# Patient Record
Sex: FEMALE | Race: WHITE | Employment: OTHER | URBAN - METROPOLITAN AREA
[De-identification: names, ages, dates, MRNs, and addresses within clinical notes are randomized per-mention and may not be internally consistent; named-entity substitution may affect disease eponyms.]

---

## 2017-01-03 ENCOUNTER — OFFICE VISIT (OUTPATIENT)
Dept: INTERNAL MEDICINE CLINIC | Age: 82
End: 2017-01-03

## 2017-01-03 VITALS
DIASTOLIC BLOOD PRESSURE: 70 MMHG | WEIGHT: 139 LBS | SYSTOLIC BLOOD PRESSURE: 112 MMHG | RESPIRATION RATE: 16 BRPM | BODY MASS INDEX: 22.1 KG/M2 | OXYGEN SATURATION: 95 % | HEART RATE: 55 BPM

## 2017-01-03 DIAGNOSIS — J42 CHRONIC BRONCHITIS, UNSPECIFIED CHRONIC BRONCHITIS TYPE (HCC): ICD-10-CM

## 2017-01-03 DIAGNOSIS — E78.2 MIXED HYPERLIPIDEMIA: ICD-10-CM

## 2017-01-03 DIAGNOSIS — I10 ESSENTIAL HYPERTENSION: ICD-10-CM

## 2017-01-03 DIAGNOSIS — I63.29 CEREBROVASCULAR ACCIDENT (CVA) DUE TO STENOSIS OF OTHER PRECEREBRAL ARTERY (HCC): ICD-10-CM

## 2017-01-03 DIAGNOSIS — I10 ESSENTIAL HYPERTENSION: Primary | ICD-10-CM

## 2017-01-03 DIAGNOSIS — D68.9 COAGULATION DEFECT (HCC): ICD-10-CM

## 2017-01-03 LAB
A/G RATIO: 1.6 (ref 1.1–2.2)
ALBUMIN SERPL-MCNC: 3.8 G/DL (ref 3.4–5)
ALP BLD-CCNC: 66 U/L (ref 40–129)
ALT SERPL-CCNC: 24 U/L (ref 10–40)
ANION GAP SERPL CALCULATED.3IONS-SCNC: 16 MMOL/L (ref 3–16)
AST SERPL-CCNC: 34 U/L (ref 15–37)
BASOPHILS ABSOLUTE: 0.1 K/UL (ref 0–0.2)
BASOPHILS RELATIVE PERCENT: 0.8 %
BILIRUB SERPL-MCNC: 0.5 MG/DL (ref 0–1)
BUN BLDV-MCNC: 31 MG/DL (ref 7–20)
CALCIUM SERPL-MCNC: 9.6 MG/DL (ref 8.3–10.6)
CHLORIDE BLD-SCNC: 100 MMOL/L (ref 99–110)
CHOLESTEROL, TOTAL: 182 MG/DL (ref 0–199)
CO2: 25 MMOL/L (ref 21–32)
CREAT SERPL-MCNC: 1.6 MG/DL (ref 0.6–1.2)
EOSINOPHILS ABSOLUTE: 0.4 K/UL (ref 0–0.6)
EOSINOPHILS RELATIVE PERCENT: 4.5 %
GFR AFRICAN AMERICAN: 36
GFR NON-AFRICAN AMERICAN: 30
GLOBULIN: 2.4 G/DL
GLUCOSE BLD-MCNC: 93 MG/DL (ref 70–99)
HCT VFR BLD CALC: 33.9 % (ref 36–48)
HDLC SERPL-MCNC: 76 MG/DL (ref 40–60)
HEMOGLOBIN: 11 G/DL (ref 12–16)
INTERNATIONAL NORMALIZATION RATIO, POC: 2.3
LDL CHOLESTEROL CALCULATED: 83 MG/DL
LYMPHOCYTES ABSOLUTE: 2 K/UL (ref 1–5.1)
LYMPHOCYTES RELATIVE PERCENT: 22.3 %
MCH RBC QN AUTO: 26.2 PG (ref 26–34)
MCHC RBC AUTO-ENTMCNC: 32.4 G/DL (ref 31–36)
MCV RBC AUTO: 80.7 FL (ref 80–100)
MONOCYTES ABSOLUTE: 0.7 K/UL (ref 0–1.3)
MONOCYTES RELATIVE PERCENT: 8 %
NEUTROPHILS ABSOLUTE: 5.9 K/UL (ref 1.7–7.7)
NEUTROPHILS RELATIVE PERCENT: 64.4 %
PDW BLD-RTO: 17.3 % (ref 12.4–15.4)
PLATELET # BLD: 229 K/UL (ref 135–450)
PMV BLD AUTO: 8.7 FL (ref 5–10.5)
POTASSIUM SERPL-SCNC: 3.5 MMOL/L (ref 3.5–5.1)
PROTHROMBIN TIME, POC: 27.4
RBC # BLD: 4.2 M/UL (ref 4–5.2)
SODIUM BLD-SCNC: 141 MMOL/L (ref 136–145)
TOTAL PROTEIN: 6.2 G/DL (ref 6.4–8.2)
TRIGL SERPL-MCNC: 117 MG/DL (ref 0–150)
TSH SERPL DL<=0.05 MIU/L-ACNC: 1.5 UIU/ML (ref 0.27–4.2)
VLDLC SERPL CALC-MCNC: 23 MG/DL
WBC # BLD: 9.2 K/UL (ref 4–11)

## 2017-01-03 PROCEDURE — 99214 OFFICE O/P EST MOD 30 MIN: CPT | Performed by: INTERNAL MEDICINE

## 2017-01-03 PROCEDURE — 85610 PROTHROMBIN TIME: CPT | Performed by: INTERNAL MEDICINE

## 2017-01-03 RX ORDER — MECLIZINE HYDROCHLORIDE CHEWABLE TABLETS 25 MG/1
25 TABLET, CHEWABLE ORAL EVERY 6 HOURS PRN
Qty: 60 TABLET | Refills: 1 | Status: SHIPPED | OUTPATIENT
Start: 2017-01-03 | End: 2017-05-02 | Stop reason: SDUPTHER

## 2017-01-03 RX ORDER — CYCLOBENZAPRINE HCL 5 MG
5 TABLET ORAL EVERY EVENING
Qty: 90 TABLET | Refills: 1 | Status: SHIPPED | OUTPATIENT
Start: 2017-01-03 | End: 2017-07-31 | Stop reason: SDUPTHER

## 2017-01-03 RX ORDER — HYDROCHLOROTHIAZIDE 12.5 MG/1
CAPSULE, GELATIN COATED ORAL
Qty: 90 CAPSULE | Refills: 0 | Status: SHIPPED | OUTPATIENT
Start: 2017-01-03 | End: 2017-04-13 | Stop reason: SDUPTHER

## 2017-01-03 RX ORDER — LOSARTAN POTASSIUM 25 MG/1
25 TABLET ORAL DAILY
Qty: 30 TABLET | Refills: 3 | Status: SHIPPED | OUTPATIENT
Start: 2017-01-03 | End: 2017-02-17 | Stop reason: SDUPTHER

## 2017-02-14 ENCOUNTER — OFFICE VISIT (OUTPATIENT)
Dept: INTERNAL MEDICINE CLINIC | Age: 82
End: 2017-02-14

## 2017-02-14 VITALS
SYSTOLIC BLOOD PRESSURE: 112 MMHG | HEART RATE: 70 BPM | WEIGHT: 138 LBS | RESPIRATION RATE: 14 BRPM | DIASTOLIC BLOOD PRESSURE: 70 MMHG | BODY MASS INDEX: 21.94 KG/M2 | OXYGEN SATURATION: 96 %

## 2017-02-14 DIAGNOSIS — I10 ESSENTIAL HYPERTENSION: Primary | ICD-10-CM

## 2017-02-14 DIAGNOSIS — D68.9 COAGULATION DEFECT (HCC): ICD-10-CM

## 2017-02-14 DIAGNOSIS — I63.29 CEREBROVASCULAR ACCIDENT (CVA) DUE TO STENOSIS OF OTHER PRECEREBRAL ARTERY (HCC): ICD-10-CM

## 2017-02-14 LAB
INTERNATIONAL NORMALIZATION RATIO, POC: 2.7
PROTHROMBIN TIME, POC: 32.1

## 2017-02-14 PROCEDURE — G8598 ASA/ANTIPLAT THER USED: HCPCS | Performed by: INTERNAL MEDICINE

## 2017-02-14 PROCEDURE — 4040F PNEUMOC VAC/ADMIN/RCVD: CPT | Performed by: INTERNAL MEDICINE

## 2017-02-14 PROCEDURE — 1090F PRES/ABSN URINE INCON ASSESS: CPT | Performed by: INTERNAL MEDICINE

## 2017-02-14 PROCEDURE — 99213 OFFICE O/P EST LOW 20 MIN: CPT | Performed by: INTERNAL MEDICINE

## 2017-02-14 PROCEDURE — 1123F ACP DISCUSS/DSCN MKR DOCD: CPT | Performed by: INTERNAL MEDICINE

## 2017-02-14 PROCEDURE — 85610 PROTHROMBIN TIME: CPT | Performed by: INTERNAL MEDICINE

## 2017-02-14 PROCEDURE — 1036F TOBACCO NON-USER: CPT | Performed by: INTERNAL MEDICINE

## 2017-02-14 PROCEDURE — G8427 DOCREV CUR MEDS BY ELIG CLIN: HCPCS | Performed by: INTERNAL MEDICINE

## 2017-02-14 PROCEDURE — G8484 FLU IMMUNIZE NO ADMIN: HCPCS | Performed by: INTERNAL MEDICINE

## 2017-02-14 PROCEDURE — G8419 CALC BMI OUT NRM PARAM NOF/U: HCPCS | Performed by: INTERNAL MEDICINE

## 2017-02-14 RX ORDER — WARFARIN SODIUM 2 MG/1
TABLET ORAL
Qty: 60 TABLET | Refills: 3 | Status: SHIPPED | OUTPATIENT
Start: 2017-02-14 | End: 2017-07-10 | Stop reason: SDUPTHER

## 2017-02-17 DIAGNOSIS — I10 ESSENTIAL HYPERTENSION: ICD-10-CM

## 2017-02-17 RX ORDER — LOSARTAN POTASSIUM 25 MG/1
25 TABLET ORAL DAILY
Qty: 30 TABLET | Refills: 3 | Status: SHIPPED | OUTPATIENT
Start: 2017-02-17 | End: 2017-06-27 | Stop reason: SDUPTHER

## 2017-03-27 ENCOUNTER — OFFICE VISIT (OUTPATIENT)
Dept: INTERNAL MEDICINE CLINIC | Age: 82
End: 2017-03-27

## 2017-03-27 VITALS
BODY MASS INDEX: 22.26 KG/M2 | HEART RATE: 72 BPM | RESPIRATION RATE: 16 BRPM | DIASTOLIC BLOOD PRESSURE: 70 MMHG | OXYGEN SATURATION: 97 % | SYSTOLIC BLOOD PRESSURE: 118 MMHG | WEIGHT: 140 LBS

## 2017-03-27 DIAGNOSIS — I10 ESSENTIAL HYPERTENSION: Primary | ICD-10-CM

## 2017-03-27 DIAGNOSIS — I10 ESSENTIAL HYPERTENSION: ICD-10-CM

## 2017-03-27 DIAGNOSIS — I63.29 CEREBROVASCULAR ACCIDENT (CVA) DUE TO STENOSIS OF OTHER PRECEREBRAL ARTERY (HCC): ICD-10-CM

## 2017-03-27 DIAGNOSIS — D68.9 COAGULATION DEFECT (HCC): ICD-10-CM

## 2017-03-27 LAB
ANION GAP SERPL CALCULATED.3IONS-SCNC: 16 MMOL/L (ref 3–16)
BUN BLDV-MCNC: 29 MG/DL (ref 7–20)
CALCIUM SERPL-MCNC: 9.5 MG/DL (ref 8.3–10.6)
CHLORIDE BLD-SCNC: 101 MMOL/L (ref 99–110)
CO2: 27 MMOL/L (ref 21–32)
CREAT SERPL-MCNC: 1.6 MG/DL (ref 0.6–1.2)
GFR AFRICAN AMERICAN: 36
GFR NON-AFRICAN AMERICAN: 30
GLUCOSE BLD-MCNC: 92 MG/DL (ref 70–99)
INTERNATIONAL NORMALIZATION RATIO, POC: 2.4
POTASSIUM SERPL-SCNC: 3.6 MMOL/L (ref 3.5–5.1)
PROTHROMBIN TIME, POC: 28.8
SODIUM BLD-SCNC: 144 MMOL/L (ref 136–145)

## 2017-03-27 PROCEDURE — 4040F PNEUMOC VAC/ADMIN/RCVD: CPT | Performed by: INTERNAL MEDICINE

## 2017-03-27 PROCEDURE — 99213 OFFICE O/P EST LOW 20 MIN: CPT | Performed by: INTERNAL MEDICINE

## 2017-03-27 PROCEDURE — G8598 ASA/ANTIPLAT THER USED: HCPCS | Performed by: INTERNAL MEDICINE

## 2017-03-27 PROCEDURE — G8427 DOCREV CUR MEDS BY ELIG CLIN: HCPCS | Performed by: INTERNAL MEDICINE

## 2017-03-27 PROCEDURE — 1036F TOBACCO NON-USER: CPT | Performed by: INTERNAL MEDICINE

## 2017-03-27 PROCEDURE — 85610 PROTHROMBIN TIME: CPT | Performed by: INTERNAL MEDICINE

## 2017-03-27 PROCEDURE — 1123F ACP DISCUSS/DSCN MKR DOCD: CPT | Performed by: INTERNAL MEDICINE

## 2017-03-27 PROCEDURE — G8484 FLU IMMUNIZE NO ADMIN: HCPCS | Performed by: INTERNAL MEDICINE

## 2017-03-27 PROCEDURE — 1090F PRES/ABSN URINE INCON ASSESS: CPT | Performed by: INTERNAL MEDICINE

## 2017-03-27 PROCEDURE — G8419 CALC BMI OUT NRM PARAM NOF/U: HCPCS | Performed by: INTERNAL MEDICINE

## 2017-03-28 RX ORDER — PREDNISONE 1 MG/1
TABLET ORAL
Qty: 90 TABLET | Refills: 0 | Status: SHIPPED | OUTPATIENT
Start: 2017-03-28 | End: 2017-05-30 | Stop reason: SDUPTHER

## 2017-04-13 DIAGNOSIS — I10 ESSENTIAL HYPERTENSION: ICD-10-CM

## 2017-04-13 RX ORDER — HYDROCHLOROTHIAZIDE 12.5 MG/1
CAPSULE, GELATIN COATED ORAL
Qty: 90 CAPSULE | Refills: 0 | Status: SHIPPED | OUTPATIENT
Start: 2017-04-13 | End: 2017-07-31 | Stop reason: SDUPTHER

## 2017-05-02 DIAGNOSIS — E78.2 MIXED HYPERLIPIDEMIA: ICD-10-CM

## 2017-05-02 DIAGNOSIS — I63.29 CEREBROVASCULAR ACCIDENT (CVA) DUE TO STENOSIS OF OTHER PRECEREBRAL ARTERY (HCC): ICD-10-CM

## 2017-05-02 RX ORDER — MECLIZINE HYDROCHLORIDE CHEWABLE TABLETS 25 MG/1
25 TABLET, CHEWABLE ORAL EVERY 6 HOURS PRN
Qty: 60 TABLET | Refills: 1 | Status: SHIPPED | OUTPATIENT
Start: 2017-05-02 | End: 2017-07-31 | Stop reason: SDUPTHER

## 2017-05-02 RX ORDER — LOVASTATIN 40 MG/1
TABLET ORAL
Qty: 180 TABLET | Refills: 1 | Status: SHIPPED | OUTPATIENT
Start: 2017-05-02 | End: 2017-10-02 | Stop reason: SDUPTHER

## 2017-05-30 ENCOUNTER — OFFICE VISIT (OUTPATIENT)
Dept: INTERNAL MEDICINE CLINIC | Age: 82
End: 2017-05-30

## 2017-05-30 VITALS
BODY MASS INDEX: 21.78 KG/M2 | SYSTOLIC BLOOD PRESSURE: 126 MMHG | DIASTOLIC BLOOD PRESSURE: 74 MMHG | WEIGHT: 137 LBS | RESPIRATION RATE: 16 BRPM

## 2017-05-30 DIAGNOSIS — I63.29 CEREBROVASCULAR ACCIDENT (CVA) DUE TO STENOSIS OF OTHER PRECEREBRAL ARTERY (HCC): ICD-10-CM

## 2017-05-30 DIAGNOSIS — E78.2 MIXED HYPERLIPIDEMIA: ICD-10-CM

## 2017-05-30 DIAGNOSIS — I10 ESSENTIAL HYPERTENSION: ICD-10-CM

## 2017-05-30 DIAGNOSIS — D68.9 COAGULATION DEFECT (HCC): ICD-10-CM

## 2017-05-30 DIAGNOSIS — J42 CHRONIC BRONCHITIS, UNSPECIFIED CHRONIC BRONCHITIS TYPE (HCC): ICD-10-CM

## 2017-05-30 DIAGNOSIS — I10 ESSENTIAL HYPERTENSION: Primary | ICD-10-CM

## 2017-05-30 LAB
A/G RATIO: 1.6 (ref 1.1–2.2)
ALBUMIN SERPL-MCNC: 4.2 G/DL (ref 3.4–5)
ALP BLD-CCNC: 76 U/L (ref 40–129)
ALT SERPL-CCNC: 12 U/L (ref 10–40)
ANION GAP SERPL CALCULATED.3IONS-SCNC: 13 MMOL/L (ref 3–16)
AST SERPL-CCNC: 19 U/L (ref 15–37)
BASOPHILS ABSOLUTE: 0 K/UL (ref 0–0.2)
BASOPHILS RELATIVE PERCENT: 0.5 %
BILIRUB SERPL-MCNC: 0.4 MG/DL (ref 0–1)
BUN BLDV-MCNC: 27 MG/DL (ref 7–20)
CALCIUM SERPL-MCNC: 9.8 MG/DL (ref 8.3–10.6)
CHLORIDE BLD-SCNC: 101 MMOL/L (ref 99–110)
CHOLESTEROL, TOTAL: 205 MG/DL (ref 0–199)
CO2: 28 MMOL/L (ref 21–32)
CREAT SERPL-MCNC: 1.3 MG/DL (ref 0.6–1.2)
EOSINOPHILS ABSOLUTE: 0.1 K/UL (ref 0–0.6)
EOSINOPHILS RELATIVE PERCENT: 1.3 %
GFR AFRICAN AMERICAN: 46
GFR NON-AFRICAN AMERICAN: 38
GLOBULIN: 2.7 G/DL
GLUCOSE BLD-MCNC: 99 MG/DL (ref 70–99)
HCT VFR BLD CALC: 39.3 % (ref 36–48)
HDLC SERPL-MCNC: 85 MG/DL (ref 40–60)
HEMOGLOBIN: 12.4 G/DL (ref 12–16)
INTERNATIONAL NORMALIZATION RATIO, POC: 2.5
LDL CHOLESTEROL CALCULATED: 97 MG/DL
LYMPHOCYTES ABSOLUTE: 1.7 K/UL (ref 1–5.1)
LYMPHOCYTES RELATIVE PERCENT: 22.2 %
MCH RBC QN AUTO: 26.4 PG (ref 26–34)
MCHC RBC AUTO-ENTMCNC: 31.4 G/DL (ref 31–36)
MCV RBC AUTO: 83.9 FL (ref 80–100)
MONOCYTES ABSOLUTE: 0.6 K/UL (ref 0–1.3)
MONOCYTES RELATIVE PERCENT: 7.8 %
NEUTROPHILS ABSOLUTE: 5.3 K/UL (ref 1.7–7.7)
NEUTROPHILS RELATIVE PERCENT: 68.2 %
PDW BLD-RTO: 18.2 % (ref 12.4–15.4)
PLATELET # BLD: 231 K/UL (ref 135–450)
PMV BLD AUTO: 9.2 FL (ref 5–10.5)
POTASSIUM SERPL-SCNC: 4 MMOL/L (ref 3.5–5.1)
PROTHROMBIN TIME, POC: 30.1
RBC # BLD: 4.69 M/UL (ref 4–5.2)
SODIUM BLD-SCNC: 142 MMOL/L (ref 136–145)
TOTAL PROTEIN: 6.9 G/DL (ref 6.4–8.2)
TRIGL SERPL-MCNC: 114 MG/DL (ref 0–150)
VLDLC SERPL CALC-MCNC: 23 MG/DL
WBC # BLD: 7.7 K/UL (ref 4–11)

## 2017-05-30 PROCEDURE — G8926 SPIRO NO PERF OR DOC: HCPCS | Performed by: INTERNAL MEDICINE

## 2017-05-30 PROCEDURE — G8598 ASA/ANTIPLAT THER USED: HCPCS | Performed by: INTERNAL MEDICINE

## 2017-05-30 PROCEDURE — 1036F TOBACCO NON-USER: CPT | Performed by: INTERNAL MEDICINE

## 2017-05-30 PROCEDURE — 1090F PRES/ABSN URINE INCON ASSESS: CPT | Performed by: INTERNAL MEDICINE

## 2017-05-30 PROCEDURE — 4040F PNEUMOC VAC/ADMIN/RCVD: CPT | Performed by: INTERNAL MEDICINE

## 2017-05-30 PROCEDURE — G8427 DOCREV CUR MEDS BY ELIG CLIN: HCPCS | Performed by: INTERNAL MEDICINE

## 2017-05-30 PROCEDURE — 99214 OFFICE O/P EST MOD 30 MIN: CPT | Performed by: INTERNAL MEDICINE

## 2017-05-30 PROCEDURE — 1123F ACP DISCUSS/DSCN MKR DOCD: CPT | Performed by: INTERNAL MEDICINE

## 2017-05-30 PROCEDURE — 3023F SPIROM DOC REV: CPT | Performed by: INTERNAL MEDICINE

## 2017-05-30 PROCEDURE — G8419 CALC BMI OUT NRM PARAM NOF/U: HCPCS | Performed by: INTERNAL MEDICINE

## 2017-05-30 PROCEDURE — 85610 PROTHROMBIN TIME: CPT | Performed by: INTERNAL MEDICINE

## 2017-05-30 RX ORDER — PREDNISONE 1 MG/1
TABLET ORAL
Qty: 90 TABLET | Refills: 0 | Status: SHIPPED | OUTPATIENT
Start: 2017-05-30 | End: 2017-06-27 | Stop reason: SDUPTHER

## 2017-06-27 DIAGNOSIS — K21.00 GASTRO-ESOPHAGEAL REFLUX DISEASE WITH ESOPHAGITIS: ICD-10-CM

## 2017-06-27 DIAGNOSIS — I10 ESSENTIAL HYPERTENSION: ICD-10-CM

## 2017-06-27 DIAGNOSIS — D68.9 COAGULATION DEFECT (HCC): ICD-10-CM

## 2017-06-27 DIAGNOSIS — I63.29 CEREBROVASCULAR ACCIDENT (CVA) DUE TO STENOSIS OF OTHER PRECEREBRAL ARTERY (HCC): ICD-10-CM

## 2017-06-27 RX ORDER — RANITIDINE 150 MG/1
150 CAPSULE ORAL 2 TIMES DAILY
Qty: 60 CAPSULE | Refills: 3 | Status: SHIPPED | OUTPATIENT
Start: 2017-06-27 | End: 2018-02-02 | Stop reason: SDUPTHER

## 2017-06-27 RX ORDER — LOSARTAN POTASSIUM 25 MG/1
25 TABLET ORAL DAILY
Qty: 30 TABLET | Refills: 3 | Status: SHIPPED | OUTPATIENT
Start: 2017-06-27 | End: 2017-10-02 | Stop reason: SDUPTHER

## 2017-06-27 RX ORDER — PREDNISONE 1 MG/1
TABLET ORAL
Qty: 90 TABLET | Refills: 0 | Status: SHIPPED | OUTPATIENT
Start: 2017-06-27 | End: 2017-07-31 | Stop reason: SDUPTHER

## 2017-07-10 DIAGNOSIS — I63.29 CEREBROVASCULAR ACCIDENT (CVA) DUE TO STENOSIS OF OTHER PRECEREBRAL ARTERY (HCC): ICD-10-CM

## 2017-07-10 RX ORDER — WARFARIN SODIUM 2 MG/1
TABLET ORAL
Qty: 60 TABLET | Refills: 3 | Status: SHIPPED | OUTPATIENT
Start: 2017-07-10 | End: 2017-07-31 | Stop reason: SDUPTHER

## 2017-07-31 ENCOUNTER — OFFICE VISIT (OUTPATIENT)
Dept: INTERNAL MEDICINE CLINIC | Age: 82
End: 2017-07-31

## 2017-07-31 VITALS
SYSTOLIC BLOOD PRESSURE: 128 MMHG | HEIGHT: 67 IN | DIASTOLIC BLOOD PRESSURE: 70 MMHG | HEART RATE: 69 BPM | BODY MASS INDEX: 21.97 KG/M2 | OXYGEN SATURATION: 96 % | RESPIRATION RATE: 17 BRPM | WEIGHT: 140 LBS

## 2017-07-31 DIAGNOSIS — I63.50 CEREBROVASCULAR ACCIDENT (CVA) DUE TO STENOSIS OF CEREBRAL ARTERY (HCC): ICD-10-CM

## 2017-07-31 DIAGNOSIS — D68.9 COAGULATION DEFECT (HCC): ICD-10-CM

## 2017-07-31 DIAGNOSIS — I10 ESSENTIAL HYPERTENSION: Primary | ICD-10-CM

## 2017-07-31 DIAGNOSIS — R42 VERTIGO: ICD-10-CM

## 2017-07-31 DIAGNOSIS — E78.2 MIXED HYPERLIPIDEMIA: ICD-10-CM

## 2017-07-31 LAB
INTERNATIONAL NORMALIZATION RATIO, POC: 2.6
PROTHROMBIN TIME, POC: 31.4

## 2017-07-31 PROCEDURE — G8420 CALC BMI NORM PARAMETERS: HCPCS | Performed by: INTERNAL MEDICINE

## 2017-07-31 PROCEDURE — 1036F TOBACCO NON-USER: CPT | Performed by: INTERNAL MEDICINE

## 2017-07-31 PROCEDURE — G8427 DOCREV CUR MEDS BY ELIG CLIN: HCPCS | Performed by: INTERNAL MEDICINE

## 2017-07-31 PROCEDURE — 4040F PNEUMOC VAC/ADMIN/RCVD: CPT | Performed by: INTERNAL MEDICINE

## 2017-07-31 PROCEDURE — 1090F PRES/ABSN URINE INCON ASSESS: CPT | Performed by: INTERNAL MEDICINE

## 2017-07-31 PROCEDURE — 1123F ACP DISCUSS/DSCN MKR DOCD: CPT | Performed by: INTERNAL MEDICINE

## 2017-07-31 PROCEDURE — 99213 OFFICE O/P EST LOW 20 MIN: CPT | Performed by: INTERNAL MEDICINE

## 2017-07-31 PROCEDURE — 85610 PROTHROMBIN TIME: CPT | Performed by: INTERNAL MEDICINE

## 2017-07-31 PROCEDURE — G8598 ASA/ANTIPLAT THER USED: HCPCS | Performed by: INTERNAL MEDICINE

## 2017-07-31 RX ORDER — WARFARIN SODIUM 2 MG/1
TABLET ORAL
Qty: 60 TABLET | Refills: 3 | Status: SHIPPED | OUTPATIENT
Start: 2017-07-31 | End: 2018-02-02 | Stop reason: SDUPTHER

## 2017-07-31 RX ORDER — PREDNISONE 1 MG/1
TABLET ORAL
Qty: 90 TABLET | Refills: 0 | Status: SHIPPED | OUTPATIENT
Start: 2017-07-31 | End: 2017-10-02 | Stop reason: SDUPTHER

## 2017-07-31 RX ORDER — HYDROCHLOROTHIAZIDE 12.5 MG/1
CAPSULE, GELATIN COATED ORAL
Qty: 90 CAPSULE | Refills: 0 | Status: SHIPPED | OUTPATIENT
Start: 2017-07-31 | End: 2017-10-02 | Stop reason: SDUPTHER

## 2017-07-31 RX ORDER — MECLIZINE HYDROCHLORIDE CHEWABLE TABLETS 25 MG/1
25 TABLET, CHEWABLE ORAL EVERY 6 HOURS PRN
Qty: 60 TABLET | Refills: 1 | Status: SHIPPED | OUTPATIENT
Start: 2017-07-31 | End: 2017-10-02 | Stop reason: SDUPTHER

## 2017-08-01 RX ORDER — CYCLOBENZAPRINE HCL 5 MG
5 TABLET ORAL EVERY EVENING
Qty: 90 TABLET | Refills: 1 | Status: SHIPPED | OUTPATIENT
Start: 2017-08-01 | End: 2018-02-02 | Stop reason: SDUPTHER

## 2017-10-02 ENCOUNTER — OFFICE VISIT (OUTPATIENT)
Dept: INTERNAL MEDICINE CLINIC | Age: 82
End: 2017-10-02

## 2017-10-02 VITALS
BODY MASS INDEX: 21.94 KG/M2 | OXYGEN SATURATION: 98 % | SYSTOLIC BLOOD PRESSURE: 136 MMHG | HEART RATE: 76 BPM | DIASTOLIC BLOOD PRESSURE: 80 MMHG | WEIGHT: 138 LBS

## 2017-10-02 DIAGNOSIS — J44.1 COPD EXACERBATION (HCC): Primary | ICD-10-CM

## 2017-10-02 DIAGNOSIS — R42 VERTIGO: ICD-10-CM

## 2017-10-02 DIAGNOSIS — E78.2 MIXED HYPERLIPIDEMIA: ICD-10-CM

## 2017-10-02 DIAGNOSIS — Z23 NEED FOR INFLUENZA VACCINATION: ICD-10-CM

## 2017-10-02 DIAGNOSIS — D68.9 COAGULATION DEFECT (HCC): ICD-10-CM

## 2017-10-02 DIAGNOSIS — I10 ESSENTIAL HYPERTENSION: ICD-10-CM

## 2017-10-02 DIAGNOSIS — J01.00 ACUTE NON-RECURRENT MAXILLARY SINUSITIS: ICD-10-CM

## 2017-10-02 LAB
INTERNATIONAL NORMALIZATION RATIO, POC: 3
PROTHROMBIN TIME, POC: 36

## 2017-10-02 PROCEDURE — 4040F PNEUMOC VAC/ADMIN/RCVD: CPT | Performed by: INTERNAL MEDICINE

## 2017-10-02 PROCEDURE — 3023F SPIROM DOC REV: CPT | Performed by: INTERNAL MEDICINE

## 2017-10-02 PROCEDURE — G8598 ASA/ANTIPLAT THER USED: HCPCS | Performed by: INTERNAL MEDICINE

## 2017-10-02 PROCEDURE — 1123F ACP DISCUSS/DSCN MKR DOCD: CPT | Performed by: INTERNAL MEDICINE

## 2017-10-02 PROCEDURE — 1090F PRES/ABSN URINE INCON ASSESS: CPT | Performed by: INTERNAL MEDICINE

## 2017-10-02 PROCEDURE — G8484 FLU IMMUNIZE NO ADMIN: HCPCS | Performed by: INTERNAL MEDICINE

## 2017-10-02 PROCEDURE — 96372 THER/PROPH/DIAG INJ SC/IM: CPT | Performed by: INTERNAL MEDICINE

## 2017-10-02 PROCEDURE — G8427 DOCREV CUR MEDS BY ELIG CLIN: HCPCS | Performed by: INTERNAL MEDICINE

## 2017-10-02 PROCEDURE — G8926 SPIRO NO PERF OR DOC: HCPCS | Performed by: INTERNAL MEDICINE

## 2017-10-02 PROCEDURE — 1036F TOBACCO NON-USER: CPT | Performed by: INTERNAL MEDICINE

## 2017-10-02 PROCEDURE — G8420 CALC BMI NORM PARAMETERS: HCPCS | Performed by: INTERNAL MEDICINE

## 2017-10-02 PROCEDURE — G0008 ADMIN INFLUENZA VIRUS VAC: HCPCS | Performed by: INTERNAL MEDICINE

## 2017-10-02 PROCEDURE — 90662 IIV NO PRSV INCREASED AG IM: CPT | Performed by: INTERNAL MEDICINE

## 2017-10-02 PROCEDURE — 85610 PROTHROMBIN TIME: CPT | Performed by: INTERNAL MEDICINE

## 2017-10-02 PROCEDURE — 99214 OFFICE O/P EST MOD 30 MIN: CPT | Performed by: INTERNAL MEDICINE

## 2017-10-02 RX ORDER — MECLIZINE HYDROCHLORIDE CHEWABLE TABLETS 25 MG/1
25 TABLET, CHEWABLE ORAL EVERY 6 HOURS PRN
Qty: 60 TABLET | Refills: 1 | Status: SHIPPED | OUTPATIENT
Start: 2017-10-02 | End: 2018-02-02 | Stop reason: SDUPTHER

## 2017-10-02 RX ORDER — AZITHROMYCIN 250 MG/1
TABLET, FILM COATED ORAL
Qty: 6 TABLET | Refills: 0 | Status: SHIPPED | OUTPATIENT
Start: 2017-10-02 | End: 2018-02-02 | Stop reason: ALTCHOICE

## 2017-10-02 RX ORDER — LOSARTAN POTASSIUM 25 MG/1
25 TABLET ORAL DAILY
Qty: 30 TABLET | Refills: 3 | Status: SHIPPED | OUTPATIENT
Start: 2017-10-02 | End: 2018-02-02 | Stop reason: SDUPTHER

## 2017-10-02 RX ORDER — HYDROCHLOROTHIAZIDE 12.5 MG/1
CAPSULE, GELATIN COATED ORAL
Qty: 90 CAPSULE | Refills: 1 | Status: SHIPPED | OUTPATIENT
Start: 2017-10-02 | End: 2018-02-02 | Stop reason: SDUPTHER

## 2017-10-02 RX ORDER — LOVASTATIN 40 MG/1
TABLET ORAL
Qty: 180 TABLET | Refills: 1 | Status: SHIPPED | OUTPATIENT
Start: 2017-10-02 | End: 2018-02-02 | Stop reason: SDUPTHER

## 2017-10-02 RX ORDER — PREDNISONE 1 MG/1
TABLET ORAL
Qty: 90 TABLET | Refills: 0 | Status: SHIPPED | OUTPATIENT
Start: 2017-10-02 | End: 2017-12-01 | Stop reason: SDUPTHER

## 2017-10-02 RX ORDER — METHYLPREDNISOLONE ACETATE 80 MG/ML
80 INJECTION, SUSPENSION INTRA-ARTICULAR; INTRALESIONAL; INTRAMUSCULAR; SOFT TISSUE ONCE
Status: COMPLETED | OUTPATIENT
Start: 2017-10-02 | End: 2017-10-02

## 2017-10-02 RX ADMIN — METHYLPREDNISOLONE ACETATE 80 MG: 80 INJECTION, SUSPENSION INTRA-ARTICULAR; INTRALESIONAL; INTRAMUSCULAR; SOFT TISSUE at 09:16

## 2017-10-02 ASSESSMENT — PATIENT HEALTH QUESTIONNAIRE - PHQ9
2. FEELING DOWN, DEPRESSED OR HOPELESS: 1
1. LITTLE INTEREST OR PLEASURE IN DOING THINGS: 0
SUM OF ALL RESPONSES TO PHQ QUESTIONS 1-9: 1
SUM OF ALL RESPONSES TO PHQ9 QUESTIONS 1 & 2: 1

## 2017-10-02 NOTE — MR AVS SNAPSHOT
Final result (Collected: 10/2/2017  8:51 AM)           10/2/2017  8:51 AM      Component Results     Component Value    INR 3.0    Protime 36.0               Additional Information        Basic Information     Date Of Birth Sex Race Ethnicity Preferred Language    5/30/1922 Female White Non-/Non  English      Problem List as of 10/2/2017  Date Reviewed: 10/2/2017                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 ([facility] [address]    Traumatic open wound of left lower leg    Peripheral vascular disease ([facility] [address]    Gout    COPD (chronic obstructive pulmonary disease) ([facility] [address]    Osteoarthritis of hand, left    Osteoarthritis, generalized    Basosquamous carcinoma    Hypertension    Hyperlipidemia    CVA (cerebral vascular accident) ([facility] [address]    Gastro-esophageal reflux disease with esophagitis      Immunizations as of 10/2/2017     Name Date    Influenza Virus Vaccine 10/1/2013, 9/14/2012    Influenza, High Dose 10/14/2016, 10/1/2015, 9/16/2014    Pneumococcal 13-valent Conjugate ([id number]) 11/21/2016    Pneumococcal Polysaccharide ([id number]) 10/1/2015      Preventive Care        Date Due    Tetanus Combination Vaccine (1 - Tdap) 5/30/1941    Zoster Vaccine 5/30/1982    Yearly Flu Vaccine (1) 9/1/2017            [employer] Signup           [employer] allows you to send messages to your doctor, view your test results, renew your prescriptions, schedule appointments, view visit notes, and more. How Do I Sign Up? 1. In your Internet browser, go to [email or web address]  2. Click on the Sign Up Now link in the Sign In box. You will see the New Member Sign Up page. 3. Enter your [employer] Access Code exactly as it appears below. You will not need to use this code after youve completed the sign-up process. If you do not sign up before the expiration date, you must request a new code.   [employer] Access Code: [email or web address]  Expires: 12/1/2017  9:07 AM

## 2017-10-02 NOTE — PROGRESS NOTES
Valerie Yarbrough  5/30/1922  10/02/17    SUBJECTIVE:    CVA and coagulopathy- inr today is 3.0.  rec hold coumadin one day since will need atbs. COPD and ac sinusitis, past couple of weeks w sinus congestion and throat congestion, tr cough and wheezing but no fever. Denies n/v/diarrhea. HTN- bp stable and w/o sx cp. Vertigo chr after cva and stable w prn antivert. Due for flu shot. Lipids:  Is continuing statin therapy and low fat diet. Tolerating medications w/o myalgias or GI upset. Pending f/u lab to be done in ~2 weeks after completion of therapy. OBJECTIVE:    /80 (Site: Left Arm, Position: Sitting, Cuff Size: Small Adult)  Pulse 76  Wt 138 lb (62.6 kg)  SpO2 98%  Breastfeeding? No  BMI 21.94 kg/m2    Physical Exam   Constitutional: She appears well-developed and well-nourished. No distress. HENT:   Head: Normocephalic and atraumatic. Nose: Nose normal. No nasal deformity. No epistaxis. Mouth/Throat: Oropharynx is clear and moist. No oropharyngeal exudate. Bilateral nasal congestion with clear discharge noted, no bilateral maxillary sinus tenderness   Eyes: Conjunctivae and EOM are normal. Pupils are equal, round, and reactive to light. Right eye exhibits no discharge. Left eye exhibits no discharge. No scleral icterus. Neck: Neck supple. No tracheal deviation present. Cardiovascular: Normal rate, regular rhythm, normal heart sounds and intact distal pulses. Exam reveals no gallop and no friction rub. No murmur heard. Pulmonary/Chest: Effort normal. No respiratory distress. She has wheezes (TR EXP BILAT). She has no rales. Abdominal: Soft. Bowel sounds are normal. She exhibits no distension and no mass. There is no tenderness. There is no rebound and no guarding. Musculoskeletal: She exhibits no edema. Lymphadenopathy:     She has no cervical adenopathy. Neurological: She is alert. Skin: Skin is warm and dry.    Psychiatric: She has a normal mood and

## 2017-12-01 ENCOUNTER — OFFICE VISIT (OUTPATIENT)
Dept: INTERNAL MEDICINE CLINIC | Age: 82
End: 2017-12-01

## 2017-12-01 VITALS
DIASTOLIC BLOOD PRESSURE: 82 MMHG | OXYGEN SATURATION: 95 % | HEART RATE: 70 BPM | SYSTOLIC BLOOD PRESSURE: 142 MMHG | WEIGHT: 130 LBS | BODY MASS INDEX: 20.67 KG/M2 | RESPIRATION RATE: 15 BRPM

## 2017-12-01 DIAGNOSIS — E78.2 MIXED HYPERLIPIDEMIA: ICD-10-CM

## 2017-12-01 DIAGNOSIS — M15.9 OSTEOARTHRITIS, GENERALIZED: ICD-10-CM

## 2017-12-01 DIAGNOSIS — I63.50 CEREBROVASCULAR ACCIDENT (CVA) DUE TO STENOSIS OF CEREBRAL ARTERY (HCC): ICD-10-CM

## 2017-12-01 DIAGNOSIS — I10 ESSENTIAL HYPERTENSION: Primary | ICD-10-CM

## 2017-12-01 DIAGNOSIS — D68.9 COAGULATION DEFECT (HCC): ICD-10-CM

## 2017-12-01 PROCEDURE — 1036F TOBACCO NON-USER: CPT | Performed by: INTERNAL MEDICINE

## 2017-12-01 PROCEDURE — 4040F PNEUMOC VAC/ADMIN/RCVD: CPT | Performed by: INTERNAL MEDICINE

## 2017-12-01 PROCEDURE — 1090F PRES/ABSN URINE INCON ASSESS: CPT | Performed by: INTERNAL MEDICINE

## 2017-12-01 PROCEDURE — 99214 OFFICE O/P EST MOD 30 MIN: CPT | Performed by: INTERNAL MEDICINE

## 2017-12-01 PROCEDURE — G8598 ASA/ANTIPLAT THER USED: HCPCS | Performed by: INTERNAL MEDICINE

## 2017-12-01 PROCEDURE — G8420 CALC BMI NORM PARAMETERS: HCPCS | Performed by: INTERNAL MEDICINE

## 2017-12-01 PROCEDURE — 1123F ACP DISCUSS/DSCN MKR DOCD: CPT | Performed by: INTERNAL MEDICINE

## 2017-12-01 PROCEDURE — G8484 FLU IMMUNIZE NO ADMIN: HCPCS | Performed by: INTERNAL MEDICINE

## 2017-12-01 PROCEDURE — G8427 DOCREV CUR MEDS BY ELIG CLIN: HCPCS | Performed by: INTERNAL MEDICINE

## 2017-12-01 RX ORDER — PREDNISONE 1 MG/1
TABLET ORAL
Qty: 90 TABLET | Refills: 0 | Status: SHIPPED | OUTPATIENT
Start: 2017-12-01 | End: 2018-02-02 | Stop reason: SDUPTHER

## 2017-12-02 LAB
A/G RATIO: 2.2 (CALC) (ref 0.8–2.6)
ALBUMIN SERPL-MCNC: 4.2 GM/DL (ref 3.5–5.2)
ALP BLD-CCNC: 63 U/L (ref 23–144)
ALT SERPL-CCNC: 21 U/L (ref 0–60)
AST SERPL-CCNC: 26 U/L (ref 0–55)
BASOPHILS ABSOLUTE: 0 K/MM3 (ref 0–0.3)
BASOPHILS RELATIVE PERCENT: 0.2 % (ref 0–2)
BILIRUB SERPL-MCNC: 0.7 MG/DL (ref 0–1.2)
BUN / CREAT RATIO: 16 (CALC) (ref 7–25)
BUN BLDV-MCNC: 22 MG/DL (ref 3–29)
CALCIUM SERPL-MCNC: 9.9 MG/DL (ref 8.5–10.5)
CHLORIDE BLD-SCNC: 102 MEQ/L (ref 96–110)
CHOLESTEROL, TOTAL: 225 MG/DL
CO2: 32 MEQ/L (ref 19–32)
COPY(IES) SENT TO:: NORMAL
CREAT SERPL-MCNC: 1.4 MG/DL
EOSINOPHILS ABSOLUTE: 0.1 K/MM3 (ref 0–0.6)
EOSINOPHILS RELATIVE PERCENT: 1.4 % (ref 0–7)
GFR SERPL CREATININE-BSD FRML MDRD: 32 ML/MIN/1.73M2
GLOBULIN: 1.9 GM/DL (CALC) (ref 1.9–3.6)
GLUCOSE BLD-MCNC: 93 MG/DL
HCT VFR BLD CALC: 42.8 % (ref 35–46)
HDLC SERPL-MCNC: 97 MG/DL
HEMOGLOBIN: 13.9 G/DL (ref 12–15.6)
LDL CHOLESTEROL: 99 MG/DL (CALC)
LEUKOCYTES, UA: 9.3 K/MM3 (ref 3.8–10.8)
LYMPHOCYTES ABSOLUTE: 1.7 K/MM3 (ref 0.9–4.1)
LYMPHOCYTES RELATIVE PERCENT: 18 % (ref 14–51)
MCH RBC QN AUTO: 28.9 PG (ref 27–33)
MCHC RBC AUTO-ENTMCNC: 32.6 G/DL (ref 32–36)
MCV RBC AUTO: 88.6 FL (ref 80–100)
MONOCYTES ABSOLUTE: 0.7 K/MM3 (ref 0.2–1.1)
MONOCYTES RELATIVE PERCENT: 7.2 % (ref 0–14)
NEUTROPHILS ABSOLUTE: 6.8 K/MM3 (ref 1.5–7.8)
PDW BLD-RTO: 16.7 % (ref 9–15)
PLATELET # BLD: 209 K/MM3 (ref 130–400)
POTASSIUM SERPL-SCNC: 3.6 MEQ/L (ref 3.4–5.3)
RBC # BLD: 4.82 M/MM3 (ref 3.9–5.2)
SEGMENTED NEUTROPHILS RELATIVE PERCENT: 73.2 % (ref 40–76)
SODIUM BLD-SCNC: 145 MEQ/L (ref 135–148)
TOTAL PROTEIN: 6.1 GM/DL (ref 6–8.3)
TRIGL SERPL-MCNC: 143 MG/DL
TSH SERPL DL<=0.05 MIU/L-ACNC: 2.14 MICRO IU/ML (ref 0.4–4)
VLDLC SERPL CALC-MCNC: 29 MG/DL (CALC) (ref 4–38)

## 2017-12-04 NOTE — PROGRESS NOTES
Call pt, labs ok/chol ok AND THYROID FXN IS NL.  TOTAL CHOL SL HIGHER --225 BUT ALSO BECAUSE HER GOOD CHOL IS IMPROVED.

## 2018-02-02 ENCOUNTER — TELEPHONE (OUTPATIENT)
Dept: INTERNAL MEDICINE CLINIC | Age: 83
End: 2018-02-02

## 2018-02-02 ENCOUNTER — OFFICE VISIT (OUTPATIENT)
Dept: INTERNAL MEDICINE CLINIC | Age: 83
End: 2018-02-02

## 2018-02-02 VITALS
BODY MASS INDEX: 21.62 KG/M2 | DIASTOLIC BLOOD PRESSURE: 60 MMHG | WEIGHT: 136 LBS | RESPIRATION RATE: 16 BRPM | SYSTOLIC BLOOD PRESSURE: 116 MMHG

## 2018-02-02 DIAGNOSIS — D68.9 COAGULATION DEFECT (HCC): ICD-10-CM

## 2018-02-02 DIAGNOSIS — M15.9 OSTEOARTHRITIS, GENERALIZED: ICD-10-CM

## 2018-02-02 DIAGNOSIS — I10 ESSENTIAL HYPERTENSION: ICD-10-CM

## 2018-02-02 DIAGNOSIS — E78.2 MIXED HYPERLIPIDEMIA: ICD-10-CM

## 2018-02-02 DIAGNOSIS — I63.50 CEREBROVASCULAR ACCIDENT (CVA) DUE TO STENOSIS OF CEREBRAL ARTERY (HCC): ICD-10-CM

## 2018-02-02 DIAGNOSIS — R42 VERTIGO: ICD-10-CM

## 2018-02-02 DIAGNOSIS — K21.00 GASTRO-ESOPHAGEAL REFLUX DISEASE WITH ESOPHAGITIS: ICD-10-CM

## 2018-02-02 DIAGNOSIS — L03.116 CELLULITIS OF LEG WITHOUT FOOT, LEFT: Primary | ICD-10-CM

## 2018-02-02 DIAGNOSIS — I73.9 PERIPHERAL VASCULAR DISEASE (HCC): ICD-10-CM

## 2018-02-02 LAB
INTERNATIONAL NORMALIZATION RATIO, POC: 1.8
PROTHROMBIN TIME, POC: 21.4

## 2018-02-02 PROCEDURE — 1090F PRES/ABSN URINE INCON ASSESS: CPT | Performed by: INTERNAL MEDICINE

## 2018-02-02 PROCEDURE — 4040F PNEUMOC VAC/ADMIN/RCVD: CPT | Performed by: INTERNAL MEDICINE

## 2018-02-02 PROCEDURE — G8598 ASA/ANTIPLAT THER USED: HCPCS | Performed by: INTERNAL MEDICINE

## 2018-02-02 PROCEDURE — G8427 DOCREV CUR MEDS BY ELIG CLIN: HCPCS | Performed by: INTERNAL MEDICINE

## 2018-02-02 PROCEDURE — 85610 PROTHROMBIN TIME: CPT | Performed by: INTERNAL MEDICINE

## 2018-02-02 PROCEDURE — 99214 OFFICE O/P EST MOD 30 MIN: CPT | Performed by: INTERNAL MEDICINE

## 2018-02-02 PROCEDURE — G8484 FLU IMMUNIZE NO ADMIN: HCPCS | Performed by: INTERNAL MEDICINE

## 2018-02-02 PROCEDURE — 1123F ACP DISCUSS/DSCN MKR DOCD: CPT | Performed by: INTERNAL MEDICINE

## 2018-02-02 PROCEDURE — G8420 CALC BMI NORM PARAMETERS: HCPCS | Performed by: INTERNAL MEDICINE

## 2018-02-02 PROCEDURE — 1036F TOBACCO NON-USER: CPT | Performed by: INTERNAL MEDICINE

## 2018-02-02 RX ORDER — WARFARIN SODIUM 2 MG/1
TABLET ORAL
Qty: 60 TABLET | Refills: 3 | Status: SHIPPED | OUTPATIENT
Start: 2018-02-02 | End: 2018-04-16

## 2018-02-02 RX ORDER — CYCLOBENZAPRINE HCL 5 MG
5 TABLET ORAL EVERY EVENING
Qty: 90 TABLET | Refills: 1 | Status: SHIPPED | OUTPATIENT
Start: 2018-02-02 | End: 2018-08-17 | Stop reason: SDUPTHER

## 2018-02-02 RX ORDER — LOSARTAN POTASSIUM 25 MG/1
25 TABLET ORAL DAILY
Qty: 90 TABLET | Refills: 1 | Status: SHIPPED | OUTPATIENT
Start: 2018-02-02 | End: 2018-03-08 | Stop reason: SDUPTHER

## 2018-02-02 RX ORDER — PREDNISONE 1 MG/1
TABLET ORAL
Qty: 90 TABLET | Refills: 1 | Status: SHIPPED | OUTPATIENT
Start: 2018-02-02 | End: 2018-04-04 | Stop reason: SDUPTHER

## 2018-02-02 RX ORDER — HYDROCHLOROTHIAZIDE 12.5 MG/1
CAPSULE, GELATIN COATED ORAL
Qty: 90 CAPSULE | Refills: 1 | Status: ON HOLD | OUTPATIENT
Start: 2018-02-02 | End: 2018-04-27 | Stop reason: HOSPADM

## 2018-02-02 RX ORDER — RANITIDINE 150 MG/1
150 CAPSULE ORAL 2 TIMES DAILY
Qty: 180 CAPSULE | Refills: 1 | Status: SHIPPED | OUTPATIENT
Start: 2018-02-02 | End: 2018-08-17 | Stop reason: SDUPTHER

## 2018-02-02 RX ORDER — LOVASTATIN 40 MG/1
TABLET ORAL
Qty: 180 TABLET | Refills: 1 | Status: SHIPPED | OUTPATIENT
Start: 2018-02-02 | End: 2018-05-23 | Stop reason: SDUPTHER

## 2018-02-02 RX ORDER — CEPHALEXIN 500 MG/1
500 CAPSULE ORAL 3 TIMES DAILY
Qty: 21 CAPSULE | Refills: 0 | Status: SHIPPED | OUTPATIENT
Start: 2018-02-02 | End: 2018-02-09

## 2018-02-02 RX ORDER — MECLIZINE HYDROCHLORIDE CHEWABLE TABLETS 25 MG/1
25 TABLET, CHEWABLE ORAL EVERY 6 HOURS PRN
Qty: 60 TABLET | Refills: 1 | Status: SHIPPED | OUTPATIENT
Start: 2018-02-02 | End: 2018-05-23 | Stop reason: SDUPTHER

## 2018-02-16 ENCOUNTER — OFFICE VISIT (OUTPATIENT)
Dept: INTERNAL MEDICINE CLINIC | Age: 83
End: 2018-02-16

## 2018-02-16 VITALS
DIASTOLIC BLOOD PRESSURE: 70 MMHG | OXYGEN SATURATION: 97 % | HEIGHT: 66 IN | RESPIRATION RATE: 16 BRPM | HEART RATE: 68 BPM | SYSTOLIC BLOOD PRESSURE: 112 MMHG

## 2018-02-16 DIAGNOSIS — I10 ESSENTIAL HYPERTENSION: ICD-10-CM

## 2018-02-16 DIAGNOSIS — J42 CHRONIC BRONCHITIS, UNSPECIFIED CHRONIC BRONCHITIS TYPE (HCC): ICD-10-CM

## 2018-02-16 DIAGNOSIS — L03.119 CELLULITIS OF LOWER EXTREMITY, UNSPECIFIED LATERALITY: Primary | ICD-10-CM

## 2018-02-16 DIAGNOSIS — I63.50 CEREBROVASCULAR ACCIDENT (CVA) DUE TO STENOSIS OF CEREBRAL ARTERY (HCC): ICD-10-CM

## 2018-02-16 LAB
INTERNATIONAL NORMALIZATION RATIO, POC: 2.3
PROTHROMBIN TIME, POC: 28.2

## 2018-02-16 PROCEDURE — G8926 SPIRO NO PERF OR DOC: HCPCS | Performed by: INTERNAL MEDICINE

## 2018-02-16 PROCEDURE — 85610 PROTHROMBIN TIME: CPT | Performed by: INTERNAL MEDICINE

## 2018-02-16 PROCEDURE — 1123F ACP DISCUSS/DSCN MKR DOCD: CPT | Performed by: INTERNAL MEDICINE

## 2018-02-16 PROCEDURE — 4040F PNEUMOC VAC/ADMIN/RCVD: CPT | Performed by: INTERNAL MEDICINE

## 2018-02-16 PROCEDURE — G8427 DOCREV CUR MEDS BY ELIG CLIN: HCPCS | Performed by: INTERNAL MEDICINE

## 2018-02-16 PROCEDURE — G8598 ASA/ANTIPLAT THER USED: HCPCS | Performed by: INTERNAL MEDICINE

## 2018-02-16 PROCEDURE — G8484 FLU IMMUNIZE NO ADMIN: HCPCS | Performed by: INTERNAL MEDICINE

## 2018-02-16 PROCEDURE — 99213 OFFICE O/P EST LOW 20 MIN: CPT | Performed by: INTERNAL MEDICINE

## 2018-02-16 PROCEDURE — G8420 CALC BMI NORM PARAMETERS: HCPCS | Performed by: INTERNAL MEDICINE

## 2018-02-16 PROCEDURE — 3023F SPIROM DOC REV: CPT | Performed by: INTERNAL MEDICINE

## 2018-02-16 PROCEDURE — 1036F TOBACCO NON-USER: CPT | Performed by: INTERNAL MEDICINE

## 2018-02-16 PROCEDURE — 1090F PRES/ABSN URINE INCON ASSESS: CPT | Performed by: INTERNAL MEDICINE

## 2018-02-16 RX ORDER — POTASSIUM CHLORIDE 750 MG/1
10 TABLET, EXTENDED RELEASE ORAL DAILY
Qty: 7 TABLET | Refills: 0 | Status: SHIPPED | OUTPATIENT
Start: 2018-02-16 | End: 2018-03-02 | Stop reason: ALTCHOICE

## 2018-02-16 RX ORDER — FUROSEMIDE 20 MG/1
20 TABLET ORAL DAILY
Qty: 7 TABLET | Refills: 0 | Status: SHIPPED | OUTPATIENT
Start: 2018-02-16 | End: 2018-03-02 | Stop reason: ALTCHOICE

## 2018-02-16 RX ORDER — CLINDAMYCIN HYDROCHLORIDE 300 MG/1
300 CAPSULE ORAL 2 TIMES DAILY
Qty: 14 CAPSULE | Refills: 0 | Status: SHIPPED | OUTPATIENT
Start: 2018-02-16 | End: 2018-02-22 | Stop reason: SDUPTHER

## 2018-02-16 NOTE — PATIENT INSTRUCTIONS
Continue to elevate legs. Please use the compression stockings daily    Hold hydrochlorothiazide while on lasix and potassium the following week. Clindamycin antibiotic will be twice/day for one week also.

## 2018-02-22 ENCOUNTER — OFFICE VISIT (OUTPATIENT)
Dept: INTERNAL MEDICINE CLINIC | Age: 83
End: 2018-02-22

## 2018-02-22 ENCOUNTER — HOSPITAL ENCOUNTER (OUTPATIENT)
Dept: ULTRASOUND IMAGING | Age: 83
Discharge: HOME | End: 2018-02-22
Attending: INTERNAL MEDICINE | Admitting: INTERNAL MEDICINE

## 2018-02-22 VITALS
RESPIRATION RATE: 16 BRPM | OXYGEN SATURATION: 92 % | DIASTOLIC BLOOD PRESSURE: 84 MMHG | BODY MASS INDEX: 21.89 KG/M2 | SYSTOLIC BLOOD PRESSURE: 132 MMHG | WEIGHT: 136.2 LBS | HEART RATE: 63 BPM | HEIGHT: 66 IN

## 2018-02-22 DIAGNOSIS — I70.232 ATHEROSCLEROSIS OF NATIVE ARTERY OF RIGHT LOWER EXTREMITY WITH ULCERATION OF CALF (HCC): ICD-10-CM

## 2018-02-22 DIAGNOSIS — I70.242 ATHEROSCLEROSIS OF NATIVE ARTERY OF BOTH LOWER EXTREMITIES WITH BILATERAL ULCERATION OF CALVES (HCC): Primary | ICD-10-CM

## 2018-02-22 DIAGNOSIS — I70.242 ATHEROSCLEROSIS OF NATIVE ARTERY OF BOTH LOWER EXTREMITIES WITH BILATERAL ULCERATION OF CALVES (HCC): ICD-10-CM

## 2018-02-22 DIAGNOSIS — L03.119 CELLULITIS OF LOWER EXTREMITY, UNSPECIFIED LATERALITY: ICD-10-CM

## 2018-02-22 DIAGNOSIS — I70.232 ATHEROSCLEROSIS OF NATIVE ARTERY OF BOTH LOWER EXTREMITIES WITH BILATERAL ULCERATION OF CALVES (HCC): Primary | ICD-10-CM

## 2018-02-22 DIAGNOSIS — I63.50 CEREBROVASCULAR ACCIDENT (CVA) DUE TO STENOSIS OF CEREBRAL ARTERY (HCC): ICD-10-CM

## 2018-02-22 DIAGNOSIS — I70.232 ATHEROSCLEROSIS OF NATIVE ARTERY OF BOTH LOWER EXTREMITIES WITH BILATERAL ULCERATION OF CALVES (HCC): ICD-10-CM

## 2018-02-22 LAB
INTERNATIONAL NORMALIZATION RATIO, POC: 2.7
PROTHROMBIN TIME, POC: NORMAL

## 2018-02-22 PROCEDURE — 1123F ACP DISCUSS/DSCN MKR DOCD: CPT | Performed by: INTERNAL MEDICINE

## 2018-02-22 PROCEDURE — G8484 FLU IMMUNIZE NO ADMIN: HCPCS | Performed by: INTERNAL MEDICINE

## 2018-02-22 PROCEDURE — 1090F PRES/ABSN URINE INCON ASSESS: CPT | Performed by: INTERNAL MEDICINE

## 2018-02-22 PROCEDURE — 4040F PNEUMOC VAC/ADMIN/RCVD: CPT | Performed by: INTERNAL MEDICINE

## 2018-02-22 PROCEDURE — 1036F TOBACCO NON-USER: CPT | Performed by: INTERNAL MEDICINE

## 2018-02-22 PROCEDURE — G8598 ASA/ANTIPLAT THER USED: HCPCS | Performed by: INTERNAL MEDICINE

## 2018-02-22 PROCEDURE — 85610 PROTHROMBIN TIME: CPT | Performed by: INTERNAL MEDICINE

## 2018-02-22 PROCEDURE — G8427 DOCREV CUR MEDS BY ELIG CLIN: HCPCS | Performed by: INTERNAL MEDICINE

## 2018-02-22 PROCEDURE — G8420 CALC BMI NORM PARAMETERS: HCPCS | Performed by: INTERNAL MEDICINE

## 2018-02-22 PROCEDURE — 99214 OFFICE O/P EST MOD 30 MIN: CPT | Performed by: INTERNAL MEDICINE

## 2018-02-22 RX ORDER — CLINDAMYCIN HYDROCHLORIDE 300 MG/1
300 CAPSULE ORAL 2 TIMES DAILY
Qty: 14 CAPSULE | Refills: 0 | Status: SHIPPED | OUTPATIENT
Start: 2018-02-22 | End: 2018-03-01

## 2018-02-22 NOTE — PROGRESS NOTES
Scheduled with Dr. Lyle Bear 3/1/18 @ 3:00.  Scheduled STAT US at [facility] today at 2 pm.

## 2018-02-22 NOTE — PROGRESS NOTES
Kenney Cooks  5/30/1922 02/22/18    SUBJECTIVE:  Cont pain noted bilat legs, has incr sores noted R>L legs, some ulceration. incr pain noted w walking. Did have arterial dopplers indicating some blockage in 2016. Has some pain w R leg even at rest over the past month. Denies fever. Also w completion of one week clinda, INR is at 2.7 today. On anticoag for her prior CVA and chr dizziness, currently stable w/o focal weakness of numbness    OBJECTIVE:    /84 (Site: Left Arm, Position: Sitting, Cuff Size: Medium Adult)   Pulse 63   Resp 16   Ht 5' 6.25\" (1.683 m)   Wt 136 lb 3.2 oz (61.8 kg)   SpO2 92%   Breastfeeding? No   BMI 21.82 kg/m²     Physical Exam   Constitutional: She appears well-developed and well-nourished. Neck: Neck supple. Cardiovascular: Normal rate, regular rhythm and normal heart sounds. Exam reveals no gallop and no friction rub. No murmur heard. Faint bilat dp pulses   Pulmonary/Chest: Effort normal and breath sounds normal. No respiratory distress. She has no wheezes. She has no rales. Abdominal: Soft. Bowel sounds are normal. She exhibits no distension. There is no tenderness. Musculoskeletal: She exhibits no edema. Neurological: She is alert. Skin: There is erythema. R>L dry small ulcerations noted lower legs. Psychiatric: She has a normal mood and affect. Vitals reviewed. ASSESSMENT:    1. Atherosclerosis of native artery of both lower extremities with bilateral ulceration of calves ([address]    2. Cellulitis of lower extremity, unspecified laterality    3. Cerebrovascular accident (CVA) due to stenosis of cerebral artery ([address]        PLAN:    Deborrpedrito Schwab was seen today for other.     Diagnoses and all orders for this visit:    Atherosclerosis of native artery of both lower extremities with bilateral ulceration of calves ([address]- w failure of sores to resolve after diuresis and improvement in edema, likely has progressive arterial blockage leading to

## 2018-03-02 ENCOUNTER — OFFICE VISIT (OUTPATIENT)
Dept: INTERNAL MEDICINE CLINIC | Age: 83
End: 2018-03-02

## 2018-03-02 VITALS
OXYGEN SATURATION: 95 % | HEART RATE: 50 BPM | RESPIRATION RATE: 16 BRPM | DIASTOLIC BLOOD PRESSURE: 70 MMHG | SYSTOLIC BLOOD PRESSURE: 138 MMHG

## 2018-03-02 DIAGNOSIS — I63.50 CEREBROVASCULAR ACCIDENT (CVA) DUE TO STENOSIS OF CEREBRAL ARTERY (HCC): ICD-10-CM

## 2018-03-02 DIAGNOSIS — L97.909 VASCULITIC ULCER OF LOWER EXTREMITY (HCC): ICD-10-CM

## 2018-03-02 DIAGNOSIS — D68.9 COAGULOPATHY (HCC): ICD-10-CM

## 2018-03-02 DIAGNOSIS — I73.9 PERIPHERAL VASCULAR DISEASE (HCC): Primary | ICD-10-CM

## 2018-03-02 LAB
INTERNATIONAL NORMALIZATION RATIO, POC: 2.6
PROTHROMBIN TIME, POC: NORMAL

## 2018-03-02 PROCEDURE — 1036F TOBACCO NON-USER: CPT | Performed by: INTERNAL MEDICINE

## 2018-03-02 PROCEDURE — 1090F PRES/ABSN URINE INCON ASSESS: CPT | Performed by: INTERNAL MEDICINE

## 2018-03-02 PROCEDURE — 4040F PNEUMOC VAC/ADMIN/RCVD: CPT | Performed by: INTERNAL MEDICINE

## 2018-03-02 PROCEDURE — G8484 FLU IMMUNIZE NO ADMIN: HCPCS | Performed by: INTERNAL MEDICINE

## 2018-03-02 PROCEDURE — G8427 DOCREV CUR MEDS BY ELIG CLIN: HCPCS | Performed by: INTERNAL MEDICINE

## 2018-03-02 PROCEDURE — 85610 PROTHROMBIN TIME: CPT | Performed by: INTERNAL MEDICINE

## 2018-03-02 PROCEDURE — 1123F ACP DISCUSS/DSCN MKR DOCD: CPT | Performed by: INTERNAL MEDICINE

## 2018-03-02 PROCEDURE — G8420 CALC BMI NORM PARAMETERS: HCPCS | Performed by: INTERNAL MEDICINE

## 2018-03-02 PROCEDURE — G8598 ASA/ANTIPLAT THER USED: HCPCS | Performed by: INTERNAL MEDICINE

## 2018-03-02 PROCEDURE — 99213 OFFICE O/P EST LOW 20 MIN: CPT | Performed by: INTERNAL MEDICINE

## 2018-03-08 DIAGNOSIS — I10 ESSENTIAL HYPERTENSION: ICD-10-CM

## 2018-03-08 RX ORDER — LOSARTAN POTASSIUM 25 MG/1
25 TABLET ORAL DAILY
Qty: 90 TABLET | Refills: 1 | Status: ON HOLD | OUTPATIENT
Start: 2018-03-08 | End: 2018-04-18 | Stop reason: HOSPADM

## 2018-04-04 ENCOUNTER — OFFICE VISIT (OUTPATIENT)
Dept: INTERNAL MEDICINE CLINIC | Age: 83
End: 2018-04-04

## 2018-04-04 VITALS
WEIGHT: 131 LBS | BODY MASS INDEX: 20.98 KG/M2 | RESPIRATION RATE: 16 BRPM | SYSTOLIC BLOOD PRESSURE: 122 MMHG | DIASTOLIC BLOOD PRESSURE: 78 MMHG

## 2018-04-04 DIAGNOSIS — D68.9 COAGULATION DEFECT (HCC): ICD-10-CM

## 2018-04-04 DIAGNOSIS — I73.9 PERIPHERAL VASCULAR DISEASE (HCC): ICD-10-CM

## 2018-04-04 DIAGNOSIS — M25.571 CHRONIC ANKLE PAIN, BILATERAL: ICD-10-CM

## 2018-04-04 DIAGNOSIS — M25.572 CHRONIC ANKLE PAIN, BILATERAL: ICD-10-CM

## 2018-04-04 DIAGNOSIS — G89.29 CHRONIC ANKLE PAIN, BILATERAL: ICD-10-CM

## 2018-04-04 DIAGNOSIS — I63.50 CEREBROVASCULAR ACCIDENT (CVA) DUE TO STENOSIS OF CEREBRAL ARTERY (HCC): Primary | ICD-10-CM

## 2018-04-04 DIAGNOSIS — M15.9 OSTEOARTHRITIS, GENERALIZED: ICD-10-CM

## 2018-04-04 LAB
INTERNATIONAL NORMALIZATION RATIO, POC: 5.3
PROTHROMBIN TIME, POC: 63.6

## 2018-04-04 PROCEDURE — G8598 ASA/ANTIPLAT THER USED: HCPCS | Performed by: INTERNAL MEDICINE

## 2018-04-04 PROCEDURE — 85610 PROTHROMBIN TIME: CPT | Performed by: INTERNAL MEDICINE

## 2018-04-04 PROCEDURE — 4040F PNEUMOC VAC/ADMIN/RCVD: CPT | Performed by: INTERNAL MEDICINE

## 2018-04-04 PROCEDURE — G8420 CALC BMI NORM PARAMETERS: HCPCS | Performed by: INTERNAL MEDICINE

## 2018-04-04 PROCEDURE — 1123F ACP DISCUSS/DSCN MKR DOCD: CPT | Performed by: INTERNAL MEDICINE

## 2018-04-04 PROCEDURE — 1036F TOBACCO NON-USER: CPT | Performed by: INTERNAL MEDICINE

## 2018-04-04 PROCEDURE — 99213 OFFICE O/P EST LOW 20 MIN: CPT | Performed by: INTERNAL MEDICINE

## 2018-04-04 PROCEDURE — 1090F PRES/ABSN URINE INCON ASSESS: CPT | Performed by: INTERNAL MEDICINE

## 2018-04-04 PROCEDURE — G8427 DOCREV CUR MEDS BY ELIG CLIN: HCPCS | Performed by: INTERNAL MEDICINE

## 2018-04-04 RX ORDER — PREDNISONE 1 MG/1
TABLET ORAL
Qty: 135 TABLET | Refills: 1 | Status: SHIPPED | OUTPATIENT
Start: 2018-04-04 | End: 2018-05-01 | Stop reason: SDUPTHER

## 2018-04-16 PROBLEM — L97.909 ARTERIAL LEG ULCER (HCC): Status: ACTIVE | Noted: 2018-04-16

## 2018-04-16 PROBLEM — L98.499 ARTERIAL INSUFFICIENCY WITH ISCHEMIC ULCER (HCC): Status: ACTIVE | Noted: 2018-04-16

## 2018-04-16 PROBLEM — N18.2 CHRONIC RENAL IMPAIRMENT, STAGE 2 (MILD): Status: ACTIVE | Noted: 2018-04-16

## 2018-04-16 PROBLEM — I77.1 ARTERIAL INSUFFICIENCY WITH ISCHEMIC ULCER (HCC): Status: ACTIVE | Noted: 2018-04-16

## 2018-04-18 PROBLEM — R53.81 DEBILITY: Status: ACTIVE | Noted: 2018-04-18

## 2018-04-19 PROBLEM — R52 UNCONTROLLED PAIN: Status: ACTIVE | Noted: 2018-04-19

## 2018-04-19 PROBLEM — R26.9 GAIT DISTURBANCE: Status: ACTIVE | Noted: 2018-04-19

## 2018-04-19 PROBLEM — I73.9 CLAUDICATION IN PERIPHERAL VASCULAR DISEASE (HCC): Status: ACTIVE | Noted: 2018-04-19

## 2018-04-27 ENCOUNTER — TELEPHONE (OUTPATIENT)
Dept: INTERNAL MEDICINE CLINIC | Age: 83
End: 2018-04-27

## 2018-04-30 ENCOUNTER — TELEPHONE (OUTPATIENT)
Dept: INTERNAL MEDICINE CLINIC | Age: 83
End: 2018-04-30

## 2018-05-01 ENCOUNTER — OFFICE VISIT (OUTPATIENT)
Dept: INTERNAL MEDICINE CLINIC | Age: 83
End: 2018-05-01

## 2018-05-01 VITALS
WEIGHT: 130 LBS | DIASTOLIC BLOOD PRESSURE: 68 MMHG | BODY MASS INDEX: 20.98 KG/M2 | OXYGEN SATURATION: 95 % | HEART RATE: 76 BPM | SYSTOLIC BLOOD PRESSURE: 131 MMHG | RESPIRATION RATE: 16 BRPM

## 2018-05-01 DIAGNOSIS — I10 ESSENTIAL HYPERTENSION: ICD-10-CM

## 2018-05-01 DIAGNOSIS — I73.9 CLAUDICATION IN PERIPHERAL VASCULAR DISEASE (HCC): ICD-10-CM

## 2018-05-01 DIAGNOSIS — M15.9 OSTEOARTHRITIS, GENERALIZED: ICD-10-CM

## 2018-05-01 DIAGNOSIS — L98.499 ARTERIAL INSUFFICIENCY WITH ISCHEMIC ULCER (HCC): Primary | ICD-10-CM

## 2018-05-01 DIAGNOSIS — N18.2 CHRONIC RENAL IMPAIRMENT, STAGE 2 (MILD): ICD-10-CM

## 2018-05-01 DIAGNOSIS — I77.1 ARTERIAL INSUFFICIENCY WITH ISCHEMIC ULCER (HCC): Primary | ICD-10-CM

## 2018-05-01 DIAGNOSIS — R26.9 GAIT DISTURBANCE: ICD-10-CM

## 2018-05-01 DIAGNOSIS — D68.9 COAGULATION DEFECT (HCC): ICD-10-CM

## 2018-05-01 LAB
ANION GAP SERPL CALCULATED.3IONS-SCNC: 15 MMOL/L (ref 3–16)
BUN BLDV-MCNC: 27 MG/DL (ref 7–20)
CALCIUM SERPL-MCNC: 9.7 MG/DL (ref 8.3–10.6)
CHLORIDE BLD-SCNC: 101 MMOL/L (ref 99–110)
CO2: 26 MMOL/L (ref 21–32)
CREAT SERPL-MCNC: 1.3 MG/DL (ref 0.6–1.2)
GFR AFRICAN AMERICAN: 46
GFR NON-AFRICAN AMERICAN: 38
GLUCOSE BLD-MCNC: 109 MG/DL (ref 70–99)
HCT VFR BLD CALC: 32.5 % (ref 36–48)
HEMOGLOBIN: 10.4 G/DL (ref 12–16)
INTERNATIONAL NORMALIZATION RATIO, POC: 3.5
MCH RBC QN AUTO: 27.4 PG (ref 26–34)
MCHC RBC AUTO-ENTMCNC: 32.1 G/DL (ref 31–36)
MCV RBC AUTO: 85.3 FL (ref 80–100)
PDW BLD-RTO: 16.7 % (ref 12.4–15.4)
PLATELET # BLD: 315 K/UL (ref 135–450)
PMV BLD AUTO: 8.8 FL (ref 5–10.5)
POTASSIUM SERPL-SCNC: 4 MMOL/L (ref 3.5–5.1)
PROTHROMBIN TIME, POC: 42
RBC # BLD: 3.81 M/UL (ref 4–5.2)
SODIUM BLD-SCNC: 142 MMOL/L (ref 136–145)
WBC # BLD: 11.1 K/UL (ref 4–11)

## 2018-05-01 PROCEDURE — 85610 PROTHROMBIN TIME: CPT | Performed by: INTERNAL MEDICINE

## 2018-05-01 PROCEDURE — 99495 TRANSJ CARE MGMT MOD F2F 14D: CPT | Performed by: INTERNAL MEDICINE

## 2018-05-01 RX ORDER — WARFARIN SODIUM 2 MG/1
TABLET ORAL
Qty: 90 TABLET | Refills: 1 | Status: ON HOLD | OUTPATIENT
Start: 2018-05-01 | End: 2018-07-13 | Stop reason: HOSPADM

## 2018-05-01 RX ORDER — PREDNISONE 1 MG/1
5 TABLET ORAL DAILY
Qty: 90 TABLET | Refills: 1 | Status: ON HOLD
Start: 2018-05-01 | End: 2018-07-06 | Stop reason: HOSPADM

## 2018-05-03 ENCOUNTER — TELEPHONE (OUTPATIENT)
Dept: INTERNAL MEDICINE CLINIC | Age: 83
End: 2018-05-03

## 2018-05-03 ENCOUNTER — HOSPITAL ENCOUNTER (OUTPATIENT)
Dept: WOUND CARE | Age: 83
Discharge: HOME | End: 2018-05-03
Attending: NURSE PRACTITIONER | Admitting: NURSE PRACTITIONER

## 2018-05-03 VITALS
DIASTOLIC BLOOD PRESSURE: 59 MMHG | WEIGHT: 126 LBS | HEART RATE: 76 BPM | TEMPERATURE: 97.9 F | HEIGHT: 66 IN | SYSTOLIC BLOOD PRESSURE: 138 MMHG | RESPIRATION RATE: 20 BRPM | BODY MASS INDEX: 20.25 KG/M2

## 2018-05-03 DIAGNOSIS — L97.909 ARTERIAL LEG ULCER (HCC): Primary | ICD-10-CM

## 2018-05-03 DIAGNOSIS — L97.912 CHRONIC ULCER OF RIGHT LOWER EXTREMITY WITH FAT LAYER EXPOSED (HCC): ICD-10-CM

## 2018-05-03 DIAGNOSIS — L97.922 CHRONIC ULCER OF LEFT LOWER EXTREMITY WITH FAT LAYER EXPOSED (HCC): ICD-10-CM

## 2018-05-03 PROCEDURE — 11042 DBRDMT SUBQ TIS 1ST 20SQCM/<: CPT | Performed by: NURSE PRACTITIONER

## 2018-05-03 PROCEDURE — 99213 OFFICE O/P EST LOW 20 MIN: CPT | Performed by: NURSE PRACTITIONER

## 2018-05-03 RX ORDER — LIDOCAINE HYDROCHLORIDE 40 MG/ML
SOLUTION TOPICAL ONCE
Status: DISCONTINUED | OUTPATIENT
Start: 2018-05-03 | End: 2018-05-04 | Stop reason: HOSPADM

## 2018-05-04 ENCOUNTER — ANTI-COAG VISIT (OUTPATIENT)
Dept: INTERNAL MEDICINE CLINIC | Age: 83
End: 2018-05-04

## 2018-05-07 ENCOUNTER — TELEPHONE (OUTPATIENT)
Dept: INTERNAL MEDICINE CLINIC | Age: 83
End: 2018-05-07

## 2018-05-07 LAB
CULTURE: NORMAL
ORGANISM: NORMAL
REPORT STATUS: NORMAL
REQUEST PROBLEM: NORMAL
SPECIMEN: NORMAL

## 2018-05-10 ENCOUNTER — HOSPITAL ENCOUNTER (OUTPATIENT)
Dept: WOUND CARE | Age: 83
Discharge: HOME | End: 2018-05-10
Attending: NURSE PRACTITIONER | Admitting: NURSE PRACTITIONER

## 2018-05-10 VITALS
TEMPERATURE: 97 F | DIASTOLIC BLOOD PRESSURE: 58 MMHG | RESPIRATION RATE: 20 BRPM | HEART RATE: 75 BPM | SYSTOLIC BLOOD PRESSURE: 150 MMHG

## 2018-05-10 DIAGNOSIS — L97.912 CHRONIC ULCER OF RIGHT LOWER EXTREMITY WITH FAT LAYER EXPOSED (HCC): Primary | ICD-10-CM

## 2018-05-10 DIAGNOSIS — L97.922 CHRONIC ULCER OF LEFT LOWER EXTREMITY WITH FAT LAYER EXPOSED (HCC): ICD-10-CM

## 2018-05-10 DIAGNOSIS — L98.499 ARTERIAL INSUFFICIENCY WITH ISCHEMIC ULCER (HCC): ICD-10-CM

## 2018-05-10 DIAGNOSIS — I77.1 ARTERIAL INSUFFICIENCY WITH ISCHEMIC ULCER (HCC): ICD-10-CM

## 2018-05-10 PROCEDURE — 11042 DBRDMT SUBQ TIS 1ST 20SQCM/<: CPT | Performed by: NURSE PRACTITIONER

## 2018-05-10 RX ORDER — LIDOCAINE HYDROCHLORIDE 40 MG/ML
SOLUTION TOPICAL ONCE
Status: DISCONTINUED | OUTPATIENT
Start: 2018-05-10 | End: 2018-05-11 | Stop reason: HOSPADM

## 2018-05-16 ENCOUNTER — ANTI-COAG VISIT (OUTPATIENT)
Dept: INTERNAL MEDICINE CLINIC | Age: 83
End: 2018-05-16

## 2018-05-17 ENCOUNTER — HOSPITAL ENCOUNTER (OUTPATIENT)
Dept: WOUND CARE | Age: 83
Discharge: HOME | End: 2018-05-17
Attending: NURSE PRACTITIONER | Admitting: NURSE PRACTITIONER

## 2018-05-17 ENCOUNTER — TELEPHONE (OUTPATIENT)
Dept: INTERNAL MEDICINE CLINIC | Age: 83
End: 2018-05-17

## 2018-05-17 VITALS
RESPIRATION RATE: 20 BRPM | DIASTOLIC BLOOD PRESSURE: 50 MMHG | SYSTOLIC BLOOD PRESSURE: 139 MMHG | TEMPERATURE: 97.3 F | HEART RATE: 85 BPM

## 2018-05-17 DIAGNOSIS — L97.912 CHRONIC ULCER OF RIGHT LOWER EXTREMITY WITH FAT LAYER EXPOSED (HCC): Primary | ICD-10-CM

## 2018-05-17 DIAGNOSIS — I73.9 PERIPHERAL VASCULAR DISEASE (HCC): ICD-10-CM

## 2018-05-17 DIAGNOSIS — L97.922 CHRONIC ULCER OF LEFT LOWER EXTREMITY WITH FAT LAYER EXPOSED (HCC): ICD-10-CM

## 2018-05-17 PROCEDURE — 11042 DBRDMT SUBQ TIS 1ST 20SQCM/<: CPT | Performed by: NURSE PRACTITIONER

## 2018-05-17 RX ORDER — LIDOCAINE HYDROCHLORIDE 40 MG/ML
SOLUTION TOPICAL ONCE
Status: DISCONTINUED | OUTPATIENT
Start: 2018-05-17 | End: 2018-05-18 | Stop reason: HOSPADM

## 2018-05-17 ASSESSMENT — PAIN SCALES - GENERAL: PAINLEVEL_OUTOF10: 0

## 2018-05-21 DIAGNOSIS — M25.572 CHRONIC ANKLE PAIN, BILATERAL: ICD-10-CM

## 2018-05-21 DIAGNOSIS — M25.571 CHRONIC ANKLE PAIN, BILATERAL: ICD-10-CM

## 2018-05-21 DIAGNOSIS — G89.29 CHRONIC ANKLE PAIN, BILATERAL: ICD-10-CM

## 2018-05-23 ENCOUNTER — OFFICE VISIT (OUTPATIENT)
Dept: INTERNAL MEDICINE CLINIC | Age: 83
End: 2018-05-23

## 2018-05-23 ENCOUNTER — ANTI-COAG VISIT (OUTPATIENT)
Dept: INTERNAL MEDICINE CLINIC | Age: 83
End: 2018-05-23

## 2018-05-23 VITALS
SYSTOLIC BLOOD PRESSURE: 110 MMHG | RESPIRATION RATE: 18 BRPM | DIASTOLIC BLOOD PRESSURE: 70 MMHG | HEART RATE: 76 BPM | OXYGEN SATURATION: 96 %

## 2018-05-23 DIAGNOSIS — R60.9 PERIPHERAL EDEMA: ICD-10-CM

## 2018-05-23 DIAGNOSIS — I63.50 CEREBROVASCULAR ACCIDENT (CVA) DUE TO STENOSIS OF CEREBRAL ARTERY (HCC): ICD-10-CM

## 2018-05-23 DIAGNOSIS — I10 ESSENTIAL HYPERTENSION: ICD-10-CM

## 2018-05-23 DIAGNOSIS — D68.9 COAGULATION DEFECT (HCC): ICD-10-CM

## 2018-05-23 DIAGNOSIS — R42 VERTIGO: ICD-10-CM

## 2018-05-23 DIAGNOSIS — L97.922 VASCULITIC ULCER OF LEFT LOWER EXTREMITY WITH FAT LAYER EXPOSED (HCC): Primary | ICD-10-CM

## 2018-05-23 DIAGNOSIS — E78.2 MIXED HYPERLIPIDEMIA: ICD-10-CM

## 2018-05-23 LAB
INTERNATIONAL NORMALIZATION RATIO, POC: 3.5
PROTHROMBIN TIME, POC: 41.6

## 2018-05-23 PROCEDURE — G8420 CALC BMI NORM PARAMETERS: HCPCS | Performed by: INTERNAL MEDICINE

## 2018-05-23 PROCEDURE — 1111F DSCHRG MED/CURRENT MED MERGE: CPT | Performed by: INTERNAL MEDICINE

## 2018-05-23 PROCEDURE — G8598 ASA/ANTIPLAT THER USED: HCPCS | Performed by: INTERNAL MEDICINE

## 2018-05-23 PROCEDURE — 1123F ACP DISCUSS/DSCN MKR DOCD: CPT | Performed by: INTERNAL MEDICINE

## 2018-05-23 PROCEDURE — 4040F PNEUMOC VAC/ADMIN/RCVD: CPT | Performed by: INTERNAL MEDICINE

## 2018-05-23 PROCEDURE — 99213 OFFICE O/P EST LOW 20 MIN: CPT | Performed by: INTERNAL MEDICINE

## 2018-05-23 PROCEDURE — 1090F PRES/ABSN URINE INCON ASSESS: CPT | Performed by: INTERNAL MEDICINE

## 2018-05-23 PROCEDURE — 85610 PROTHROMBIN TIME: CPT | Performed by: INTERNAL MEDICINE

## 2018-05-23 PROCEDURE — G8427 DOCREV CUR MEDS BY ELIG CLIN: HCPCS | Performed by: INTERNAL MEDICINE

## 2018-05-23 PROCEDURE — 1036F TOBACCO NON-USER: CPT | Performed by: INTERNAL MEDICINE

## 2018-05-23 RX ORDER — HYDRALAZINE HYDROCHLORIDE 10 MG/1
10 TABLET, FILM COATED ORAL 2 TIMES DAILY
Qty: 60 TABLET | Refills: 3 | Status: ON HOLD
Start: 2018-05-23 | End: 2018-07-13 | Stop reason: HOSPADM

## 2018-05-23 RX ORDER — LOVASTATIN 40 MG/1
40 TABLET ORAL 2 TIMES DAILY
Qty: 180 TABLET | Refills: 1 | Status: SHIPPED | OUTPATIENT
Start: 2018-05-23 | End: 2018-08-17 | Stop reason: SDUPTHER

## 2018-05-23 RX ORDER — HYDROCHLOROTHIAZIDE 12.5 MG/1
12.5 TABLET ORAL
Qty: 30 TABLET | Refills: 2 | Status: SHIPPED | OUTPATIENT
Start: 2018-05-23 | End: 2018-08-17 | Stop reason: SDUPTHER

## 2018-05-23 RX ORDER — MECLIZINE HYDROCHLORIDE CHEWABLE TABLETS 25 MG/1
25 TABLET, CHEWABLE ORAL EVERY 6 HOURS PRN
Qty: 60 TABLET | Refills: 1 | Status: SHIPPED | OUTPATIENT
Start: 2018-05-23 | End: 2018-08-13 | Stop reason: SDUPTHER

## 2018-05-24 ENCOUNTER — ANTI-COAG VISIT (OUTPATIENT)
Dept: INTERNAL MEDICINE CLINIC | Age: 83
End: 2018-05-24

## 2018-05-31 ENCOUNTER — HOSPITAL ENCOUNTER (OUTPATIENT)
Dept: WOUND CARE | Age: 83
Discharge: HOME | End: 2018-05-31
Attending: NURSE PRACTITIONER | Admitting: NURSE PRACTITIONER

## 2018-05-31 VITALS
SYSTOLIC BLOOD PRESSURE: 134 MMHG | TEMPERATURE: 97.7 F | HEART RATE: 79 BPM | RESPIRATION RATE: 16 BRPM | DIASTOLIC BLOOD PRESSURE: 56 MMHG

## 2018-05-31 DIAGNOSIS — I73.9 PERIPHERAL VASCULAR DISEASE (HCC): ICD-10-CM

## 2018-05-31 DIAGNOSIS — L97.922 CHRONIC ULCER OF LEFT LOWER EXTREMITY WITH FAT LAYER EXPOSED (HCC): Primary | ICD-10-CM

## 2018-05-31 PROBLEM — L97.912 CHRONIC ULCER OF RIGHT LOWER EXTREMITY WITH FAT LAYER EXPOSED (HCC): Status: RESOLVED | Noted: 2018-05-03 | Resolved: 2018-05-31

## 2018-05-31 PROCEDURE — 11042 DBRDMT SUBQ TIS 1ST 20SQCM/<: CPT | Performed by: NURSE PRACTITIONER

## 2018-05-31 RX ORDER — LIDOCAINE HYDROCHLORIDE 40 MG/ML
SOLUTION TOPICAL ONCE
Status: DISCONTINUED | OUTPATIENT
Start: 2018-05-31 | End: 2018-06-01 | Stop reason: HOSPADM

## 2018-05-31 ASSESSMENT — PAIN DESCRIPTION - ORIENTATION: ORIENTATION: LEFT

## 2018-05-31 ASSESSMENT — PAIN DESCRIPTION - ONSET: ONSET: ON-GOING

## 2018-05-31 ASSESSMENT — PAIN DESCRIPTION - PROGRESSION: CLINICAL_PROGRESSION: NOT CHANGED

## 2018-05-31 ASSESSMENT — PAIN SCALES - GENERAL: PAINLEVEL_OUTOF10: 9

## 2018-05-31 ASSESSMENT — PAIN DESCRIPTION - LOCATION: LOCATION: LEG

## 2018-05-31 ASSESSMENT — PAIN DESCRIPTION - FREQUENCY: FREQUENCY: CONTINUOUS

## 2018-05-31 ASSESSMENT — PAIN DESCRIPTION - PAIN TYPE: TYPE: ACUTE PAIN

## 2018-06-06 ENCOUNTER — ANTI-COAG VISIT (OUTPATIENT)
Dept: INTERNAL MEDICINE CLINIC | Age: 83
End: 2018-06-06

## 2018-06-06 ENCOUNTER — OFFICE VISIT (OUTPATIENT)
Dept: INTERNAL MEDICINE CLINIC | Age: 83
End: 2018-06-06

## 2018-06-06 VITALS — RESPIRATION RATE: 16 BRPM | OXYGEN SATURATION: 96 % | HEART RATE: 82 BPM | BODY MASS INDEX: 21.8 KG/M2 | WEIGHT: 131 LBS

## 2018-06-06 DIAGNOSIS — I63.50 CEREBROVASCULAR ACCIDENT (CVA) DUE TO STENOSIS OF CEREBRAL ARTERY (HCC): ICD-10-CM

## 2018-06-06 DIAGNOSIS — I70.242 ATHEROSCLEROSIS OF NATIVE ARTERY OF BOTH LOWER EXTREMITIES WITH BILATERAL ULCERATION OF CALVES (HCC): Primary | ICD-10-CM

## 2018-06-06 DIAGNOSIS — I87.2 CHRONIC VENOUS STASIS DERMATITIS: ICD-10-CM

## 2018-06-06 DIAGNOSIS — D68.9 COAGULATION DEFECT (HCC): ICD-10-CM

## 2018-06-06 DIAGNOSIS — I70.232 ATHEROSCLEROSIS OF NATIVE ARTERY OF BOTH LOWER EXTREMITIES WITH BILATERAL ULCERATION OF CALVES (HCC): Primary | ICD-10-CM

## 2018-06-06 LAB
INTERNATIONAL NORMALIZATION RATIO, POC: 3.7
PROTHROMBIN TIME, POC: 44

## 2018-06-06 PROCEDURE — 99213 OFFICE O/P EST LOW 20 MIN: CPT | Performed by: INTERNAL MEDICINE

## 2018-06-06 PROCEDURE — 1090F PRES/ABSN URINE INCON ASSESS: CPT | Performed by: INTERNAL MEDICINE

## 2018-06-06 PROCEDURE — 1123F ACP DISCUSS/DSCN MKR DOCD: CPT | Performed by: INTERNAL MEDICINE

## 2018-06-06 PROCEDURE — G8420 CALC BMI NORM PARAMETERS: HCPCS | Performed by: INTERNAL MEDICINE

## 2018-06-06 PROCEDURE — G8598 ASA/ANTIPLAT THER USED: HCPCS | Performed by: INTERNAL MEDICINE

## 2018-06-06 PROCEDURE — 1036F TOBACCO NON-USER: CPT | Performed by: INTERNAL MEDICINE

## 2018-06-06 PROCEDURE — 85610 PROTHROMBIN TIME: CPT | Performed by: INTERNAL MEDICINE

## 2018-06-06 PROCEDURE — G8427 DOCREV CUR MEDS BY ELIG CLIN: HCPCS | Performed by: INTERNAL MEDICINE

## 2018-06-06 PROCEDURE — 4040F PNEUMOC VAC/ADMIN/RCVD: CPT | Performed by: INTERNAL MEDICINE

## 2018-06-06 RX ORDER — HYDROCODONE BITARTRATE AND ACETAMINOPHEN 5; 325 MG/1; MG/1
1 TABLET ORAL EVERY 8 HOURS PRN
Qty: 20 TABLET | Refills: 0 | Status: SHIPPED | OUTPATIENT
Start: 2018-06-06 | End: 2018-08-17 | Stop reason: SDUPTHER

## 2018-06-07 ENCOUNTER — HOSPITAL ENCOUNTER (OUTPATIENT)
Dept: WOUND CARE | Age: 83
Discharge: HOME | End: 2018-06-07
Attending: INTERNAL MEDICINE | Admitting: INTERNAL MEDICINE

## 2018-06-07 VITALS
TEMPERATURE: 97 F | DIASTOLIC BLOOD PRESSURE: 40 MMHG | SYSTOLIC BLOOD PRESSURE: 90 MMHG | HEART RATE: 81 BPM | RESPIRATION RATE: 18 BRPM

## 2018-06-07 DIAGNOSIS — I77.1 ARTERIAL INSUFFICIENCY WITH ISCHEMIC ULCER (HCC): ICD-10-CM

## 2018-06-07 DIAGNOSIS — L97.922 CHRONIC ULCER OF LEFT LOWER EXTREMITY WITH FAT LAYER EXPOSED (HCC): Primary | ICD-10-CM

## 2018-06-07 DIAGNOSIS — L98.499 ARTERIAL INSUFFICIENCY WITH ISCHEMIC ULCER (HCC): ICD-10-CM

## 2018-06-07 RX ORDER — LIDOCAINE HYDROCHLORIDE 40 MG/ML
SOLUTION TOPICAL ONCE
Status: DISCONTINUED | OUTPATIENT
Start: 2018-06-07 | End: 2018-06-08 | Stop reason: HOSPADM

## 2018-06-07 ASSESSMENT — PAIN DESCRIPTION - LOCATION: LOCATION: LEG

## 2018-06-07 ASSESSMENT — PAIN DESCRIPTION - PAIN TYPE: TYPE: CHRONIC PAIN

## 2018-06-07 ASSESSMENT — PAIN DESCRIPTION - ORIENTATION: ORIENTATION: LEFT

## 2018-06-07 ASSESSMENT — PAIN SCALES - GENERAL: PAINLEVEL_OUTOF10: 7

## 2018-06-07 ASSESSMENT — PAIN DESCRIPTION - PROGRESSION: CLINICAL_PROGRESSION: NOT CHANGED

## 2018-06-07 ASSESSMENT — PAIN DESCRIPTION - ONSET: ONSET: ON-GOING

## 2018-06-07 ASSESSMENT — PAIN DESCRIPTION - FREQUENCY: FREQUENCY: CONTINUOUS

## 2018-06-11 LAB
CULTURE: NORMAL
ORGANISM: NORMAL
REPORT STATUS: NORMAL
REQUEST PROBLEM: NORMAL
SPECIMEN: NORMAL

## 2018-06-14 ENCOUNTER — HOSPITAL ENCOUNTER (OUTPATIENT)
Dept: WOUND CARE | Age: 83
Discharge: HOME | End: 2018-06-14
Attending: NURSE PRACTITIONER | Admitting: NURSE PRACTITIONER

## 2018-06-14 ENCOUNTER — TELEPHONE (OUTPATIENT)
Dept: INTERNAL MEDICINE CLINIC | Age: 83
End: 2018-06-14

## 2018-06-14 VITALS
TEMPERATURE: 97.7 F | RESPIRATION RATE: 16 BRPM | SYSTOLIC BLOOD PRESSURE: 135 MMHG | DIASTOLIC BLOOD PRESSURE: 56 MMHG | HEART RATE: 79 BPM

## 2018-06-14 DIAGNOSIS — L97.922 CHRONIC ULCER OF LEFT LOWER EXTREMITY WITH FAT LAYER EXPOSED (HCC): Primary | ICD-10-CM

## 2018-06-14 DIAGNOSIS — I10 ESSENTIAL HYPERTENSION: ICD-10-CM

## 2018-06-14 PROCEDURE — 11042 DBRDMT SUBQ TIS 1ST 20SQCM/<: CPT | Performed by: NURSE PRACTITIONER

## 2018-06-14 RX ORDER — HYDROCODONE BITARTRATE AND ACETAMINOPHEN 5; 325 MG/1; MG/1
1 TABLET ORAL EVERY 8 HOURS PRN
COMMUNITY
End: 2018-12-06 | Stop reason: ALTCHOICE

## 2018-06-14 RX ORDER — DOXYCYCLINE HYCLATE 100 MG/1
100 CAPSULE ORAL 2 TIMES DAILY
COMMUNITY
Start: 2018-06-14 | End: 2018-06-24

## 2018-06-14 RX ORDER — LOSARTAN POTASSIUM 25 MG/1
25 TABLET ORAL DAILY
Qty: 90 TABLET | Refills: 1
Start: 2018-06-14 | End: 2018-08-17 | Stop reason: SDUPTHER

## 2018-06-14 RX ORDER — LIDOCAINE HYDROCHLORIDE 40 MG/ML
SOLUTION TOPICAL ONCE
Status: DISCONTINUED | OUTPATIENT
Start: 2018-06-14 | End: 2018-06-15 | Stop reason: HOSPADM

## 2018-06-14 RX ORDER — DOXYCYCLINE HYCLATE 100 MG/1
100 CAPSULE ORAL 2 TIMES DAILY
Qty: 20 CAPSULE | Refills: 0 | Status: SHIPPED | OUTPATIENT
Start: 2018-06-14 | End: 2018-06-24

## 2018-06-14 ASSESSMENT — PAIN DESCRIPTION - ORIENTATION: ORIENTATION: LEFT

## 2018-06-14 ASSESSMENT — PAIN DESCRIPTION - PAIN TYPE: TYPE: ACUTE PAIN

## 2018-06-14 ASSESSMENT — PAIN SCALES - GENERAL: PAINLEVEL_OUTOF10: 6

## 2018-06-14 ASSESSMENT — PAIN DESCRIPTION - LOCATION: LOCATION: LEG

## 2018-06-14 ASSESSMENT — PAIN DESCRIPTION - FREQUENCY: FREQUENCY: CONTINUOUS

## 2018-06-14 ASSESSMENT — PAIN DESCRIPTION - PROGRESSION: CLINICAL_PROGRESSION: NOT CHANGED

## 2018-06-14 ASSESSMENT — PAIN DESCRIPTION - ONSET: ONSET: ON-GOING

## 2018-06-14 ASSESSMENT — PAIN DESCRIPTION - DESCRIPTORS: DESCRIPTORS: THROBBING;SHARP

## 2018-06-21 ENCOUNTER — HOSPITAL ENCOUNTER (OUTPATIENT)
Dept: WOUND CARE | Age: 83
Discharge: HOME | End: 2018-06-21
Attending: NURSE PRACTITIONER | Admitting: NURSE PRACTITIONER

## 2018-06-21 VITALS
TEMPERATURE: 97.1 F | RESPIRATION RATE: 20 BRPM | SYSTOLIC BLOOD PRESSURE: 140 MMHG | DIASTOLIC BLOOD PRESSURE: 59 MMHG | HEART RATE: 72 BPM

## 2018-06-21 DIAGNOSIS — I73.9 PERIPHERAL VASCULAR DISEASE (HCC): ICD-10-CM

## 2018-06-21 DIAGNOSIS — L97.922 CHRONIC ULCER OF LEFT LOWER EXTREMITY WITH FAT LAYER EXPOSED (HCC): Primary | ICD-10-CM

## 2018-06-21 PROCEDURE — 11042 DBRDMT SUBQ TIS 1ST 20SQCM/<: CPT | Performed by: NURSE PRACTITIONER

## 2018-06-21 RX ORDER — LIDOCAINE HYDROCHLORIDE 40 MG/ML
SOLUTION TOPICAL ONCE
Status: DISCONTINUED | OUTPATIENT
Start: 2018-06-21 | End: 2018-06-22 | Stop reason: HOSPADM

## 2018-06-27 ENCOUNTER — ANTI-COAG VISIT (OUTPATIENT)
Dept: INTERNAL MEDICINE CLINIC | Age: 83
End: 2018-06-27

## 2018-06-28 ENCOUNTER — HOSPITAL ENCOUNTER (OUTPATIENT)
Dept: WOUND CARE | Age: 83
Discharge: HOME | End: 2018-06-28
Attending: NURSE PRACTITIONER | Admitting: NURSE PRACTITIONER

## 2018-06-28 VITALS — HEART RATE: 80 BPM | SYSTOLIC BLOOD PRESSURE: 152 MMHG | TEMPERATURE: 97.2 F | DIASTOLIC BLOOD PRESSURE: 67 MMHG

## 2018-06-28 DIAGNOSIS — L97.922 CHRONIC ULCER OF LEFT LOWER EXTREMITY WITH FAT LAYER EXPOSED (HCC): Primary | ICD-10-CM

## 2018-06-28 DIAGNOSIS — I73.9 PERIPHERAL VASCULAR DISEASE (HCC): ICD-10-CM

## 2018-06-28 PROCEDURE — 99213 OFFICE O/P EST LOW 20 MIN: CPT | Performed by: NURSE PRACTITIONER

## 2018-07-03 ENCOUNTER — ANTI-COAG VISIT (OUTPATIENT)
Dept: INTERNAL MEDICINE CLINIC | Age: 83
End: 2018-07-03

## 2018-07-03 PROBLEM — I25.10 CAD IN NATIVE ARTERY: Status: ACTIVE | Noted: 2018-07-03

## 2018-07-04 PROBLEM — D62 ACUTE BLOOD LOSS ANEMIA: Status: ACTIVE | Noted: 2018-07-04

## 2018-07-05 PROBLEM — I72.9 PSEUDOANEURYSM (HCC): Status: ACTIVE | Noted: 2018-07-05

## 2018-07-06 ENCOUNTER — CARE COORDINATION (OUTPATIENT)
Dept: CASE MANAGEMENT | Age: 83
End: 2018-07-06

## 2018-07-17 ENCOUNTER — ANTI-COAG VISIT (OUTPATIENT)
Dept: INTERNAL MEDICINE CLINIC | Age: 83
End: 2018-07-17

## 2018-07-18 ENCOUNTER — ANTI-COAG VISIT (OUTPATIENT)
Dept: INTERNAL MEDICINE CLINIC | Age: 83
End: 2018-07-18

## 2018-07-18 ENCOUNTER — OFFICE VISIT (OUTPATIENT)
Dept: INTERNAL MEDICINE CLINIC | Age: 83
End: 2018-07-18

## 2018-07-18 VITALS
HEART RATE: 81 BPM | BODY MASS INDEX: 20.82 KG/M2 | RESPIRATION RATE: 16 BRPM | WEIGHT: 129 LBS | OXYGEN SATURATION: 96 % | DIASTOLIC BLOOD PRESSURE: 76 MMHG | SYSTOLIC BLOOD PRESSURE: 132 MMHG

## 2018-07-18 DIAGNOSIS — I72.9 PSEUDOANEURYSM (HCC): Primary | ICD-10-CM

## 2018-07-18 DIAGNOSIS — I63.50 CEREBROVASCULAR ACCIDENT (CVA) DUE TO STENOSIS OF CEREBRAL ARTERY (HCC): ICD-10-CM

## 2018-07-18 DIAGNOSIS — I77.1 ARTERIAL INSUFFICIENCY WITH ISCHEMIC ULCER (HCC): ICD-10-CM

## 2018-07-18 DIAGNOSIS — L98.499 ARTERIAL INSUFFICIENCY WITH ISCHEMIC ULCER (HCC): ICD-10-CM

## 2018-07-18 DIAGNOSIS — D68.9 COAGULATION DEFECT (HCC): ICD-10-CM

## 2018-07-18 LAB
INTERNATIONAL NORMALIZATION RATIO, POC: 1.1
PROTHROMBIN TIME, POC: 12.9

## 2018-07-18 PROCEDURE — 85610 PROTHROMBIN TIME: CPT | Performed by: INTERNAL MEDICINE

## 2018-07-18 PROCEDURE — 99496 TRANSJ CARE MGMT HIGH F2F 7D: CPT | Performed by: INTERNAL MEDICINE

## 2018-07-18 NOTE — PROGRESS NOTES
blood loss anemia    Hematoma of groin    Physical deconditioning    Pseudoaneurysm (HCC)       Past Medical History:   Diagnosis Date    AAA (abdominal aortic aneurysm) (HCC)     Basosquamous carcinoma 8/12    L leg - Dr Matthew Lai COPD (chronic obstructive pulmonary disease) ([facility] [address]     CVA (cerebral vascular accident) ([address]     L parietal w speech deficit  ~2000, sx resolved    Gastro-esophageal reflux disease with esophagitis     Gout     hand swelling and pain    Hyperlipidemia     Non-healing surgical wound     Osteoarthritis     Osteoarthritis, generalized     on chr pred started by Rheum    Peripheral vascular disease ([address] 1/2008, 11/20/2008    S/P PTA and stents X 2 to Right leg- Dr Vaishali Garcia Thoracic aortic aneurysm ([facility] [address]     Venous stasis ulcer ([facility] [address] 11/19/2012     Past Surgical History:   Procedure Laterality Date    APPENDECTOMY      ARTERY SURGERY      STENT TO LEFT FEMORIAL AND ALSO TO RIGHT SUPRA FEMORIAL ARTERY    FRACTURE SURGERY      Left ???   -ORIF    HYSTERECTOMY, TOTAL ABDOMINAL  1949    age 27-for fibroids    LEG SURGERY      11/20/2008-Right leg- PTA and Stent for PVD - Dr Liz Slipper:   1/2008- Right leg -PTA and Stent- Dr Janell Lea  9/7/12    L basosquamous cell CA L leg    VASCULAR SURGERY       Family History   Problem Relation Age of Onset    Cancer Mother     Early Death Mother     Hearing Loss Mother     Cancer Sister      Social History     Social History    Marital status:       Spouse name: N/A    Number of children: N/A    Years of education: N/A     Occupational History    Retired      Social History Main Topics    Smoking status: Former Smoker     Packs/day: 0.00     Quit date: 1/20/2000    Smokeless tobacco: Former User      Comment: Prior Hx of 1 Pack per week, none currently    Alcohol use Yes      Comment: pt states \"one drink a day\"    Drug use: No    Sexual activity: No     Other Topics Concern    Not on file     Social History Narrative    No narrative on file           OBJECTIVE:    /76   Pulse 81   Resp 16   Wt 129 lb (58.5 kg)   LMP  (LMP Unknown)   SpO2 96%   BMI 20.82 kg/m²     Physical Exam   Constitutional: She appears well-developed and well-nourished. No distress. HENT:   Head: Normocephalic and atraumatic. Nose: Nose normal.   Mouth/Throat: Oropharynx is clear and moist. No oropharyngeal exudate. Eyes: Conjunctivae and EOM are normal. Pupils are equal, round, and reactive to light. Right eye exhibits no discharge. Left eye exhibits no discharge. No scleral icterus. Neck: Neck supple. No tracheal deviation present. Cardiovascular: Normal rate, regular rhythm, normal heart sounds and intact distal pulses. Exam reveals no gallop and no friction rub. No murmur heard. Pulmonary/Chest: Effort normal and breath sounds normal. No respiratory distress. She has no wheezes. She has no rales. Abdominal: Soft. Bowel sounds are normal. She exhibits no mass. There is no tenderness. There is no rebound and no guarding. R groin w/o bruising or mass, bleeding   Musculoskeletal: She exhibits no edema. Lymphadenopathy:     She has no cervical adenopathy. Neurological: She is alert. Skin: Skin is warm and dry. Healing L tibial dry ulcer ~1.5cm. Psychiatric: She has a normal mood and affect. Vitals reviewed. ASSESSMENT:    1. Pseudoaneurysm ([address]    2.  Arterial insufficiency with ischemic ulcer (HCC)    3. Coagulation defect ([address]    4. Cerebrovascular accident (CVA) due to stenosis of cerebral artery ([address]        PLAN:    Nadine Moran was seen today for follow-up from hospital.    Diagnoses and all orders for this visit:    Pseudoaneurysm ([address]- CLINICALLY RESOLVED AFTER IR INTERVENTION, ALSO CONT MONITORING W HHC, F/U LAB ORDERED.  -     CBC Auto Differential; Future    Arterial insufficiency with ischemic ulcer ([address]- SLOWLY HEALING- W IMPROVED BLOOD FLOW S/P PTA, FOR WOUND CLINIC F/U THIS

## 2018-07-19 ENCOUNTER — HOSPITAL ENCOUNTER (OUTPATIENT)
Dept: WOUND CARE | Age: 83
Discharge: HOME | End: 2018-07-19
Attending: NURSE PRACTITIONER | Admitting: NURSE PRACTITIONER

## 2018-07-19 VITALS
HEART RATE: 80 BPM | DIASTOLIC BLOOD PRESSURE: 71 MMHG | TEMPERATURE: 97.9 F | RESPIRATION RATE: 18 BRPM | SYSTOLIC BLOOD PRESSURE: 182 MMHG

## 2018-07-19 DIAGNOSIS — I73.9 PERIPHERAL VASCULAR DISEASE (HCC): ICD-10-CM

## 2018-07-19 DIAGNOSIS — L97.922 CHRONIC ULCER OF LEFT LOWER EXTREMITY WITH FAT LAYER EXPOSED (HCC): Primary | ICD-10-CM

## 2018-07-19 PROCEDURE — 11042 DBRDMT SUBQ TIS 1ST 20SQCM/<: CPT | Performed by: NURSE PRACTITIONER

## 2018-07-19 RX ORDER — LIDOCAINE HYDROCHLORIDE 40 MG/ML
SOLUTION TOPICAL ONCE
Status: DISCONTINUED | OUTPATIENT
Start: 2018-07-19 | End: 2018-07-20 | Stop reason: HOSPADM

## 2018-07-19 NOTE — PROGRESS NOTES
Wound Care Center Progress Note with Procedure Note    Doc Bae  AGE: 80 y.o. GENDER: female  : 1922  EPISODE DATE:  2018     Subjective:     Chief Complaint   Patient presents with    Wound Check     left leg          HISTORY of PRESENT ILLNESS      Doc Bae is a 80 y.o. female who presents today for wound evaluation of Chronic venous and arterial wound(s) of the left lower leg. The wound is of moderate severity. The underlying cause of the wound is venous and arterial disease. She had left SFA angioplasty on 18 with Dr. Wolf Guaman.   Wound Pain Timing/Severity: intermittent  Quality of pain: tender  Severity of pain:  3 / 10   Modifying Factors: edema and arterial insufficiency  Associated Signs/Symptoms: none        PAST MEDICAL HISTORY        Diagnosis Date    AAA (abdominal aortic aneurysm) (HCC)     Basosquamous carcinoma     L leg - Dr Kurt Sanchez COPD (chronic obstructive pulmonary disease) ([address]     CVA (cerebral vascular accident) ([address]     L parietal w speech deficit  ~, sx resolved    Gastro-esophageal reflux disease with esophagitis     Gout     hand swelling and pain    Hyperlipidemia     Non-healing surgical wound     Osteoarthritis     Osteoarthritis, generalized     on chr pred started by Rheum    Peripheral vascular disease ([address] 2008, 2008    S/P PTA and stents X 2 to Right leg- Dr Austin Hutson Thoracic aortic aneurysm ([address]     Venous stasis ulcer ([address] 2012       PAST SURGICAL HISTORY    Past Surgical History:   Procedure Laterality Date    APPENDECTOMY      ARTERY SURGERY      STENT TO LEFT FEMORIAL AND ALSO TO RIGHT SUPRA FEMORIAL ARTERY    FRACTURE SURGERY      Left ???   -ORIF    HYSTERECTOMY, TOTAL ABDOMINAL  1949    age 27-for fibroids    LEG SURGERY      2008-Right leg- PTA and Stent for PVD - Dr Lynch Wolbach:   2008- Right leg -PTA and Stent- Dr Nicholas Douglas  12    L basosquamous cell CA L leg    VASCULAR facility-administered medications on file prior to encounter. REVIEW OF SYSTEMS    Pertinent items are noted in HPI. Constitutional: Negative for systemic symptoms including fever, chills and malaise. Objective:      BP (!) 182/71   Pulse 80   Temp 97.9 °F (36.6 °C) (Temporal)   Resp 18   LMP  (LMP Unknown)     PHYSICAL EXAM    General: The patient is in no acute distress. Mental status:  Patient is appropriate, is  oriented to place and plan of care. 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 Performed by: LES Wagoner CNP    Consent obtained: Yes    Time out taken:  Yes    Pain Control: Anesthetic  Anesthetic: 4% Lidocaine Liquid Topical     Debridement:Excisional Debridement    Using #15 blade scalpel the wound(s) was/were sharply debrided down through and including the removal of subcutaneous tissue. Devitalized Tissue Debrided:  fibrin and biofilm    Pre Debridement Measurements:  Are located in the Wound Documentation Flow Sheet    All active wounds listed below with today's date are evaluated  Wound(s)    debrided this date include # : 5     Post  Debridement Measurements:  Wound 05/03/18 Arterial ulcer # 5 LEFT MEDIAL PROXIMAL LOWER LEG (ONSET A WEEK AGO) (merged with #6 left distal medial leg 7/19/18)  (Active)   Wound Image   7/19/2018  3:01 PM   Wound Type Wound 7/19/2018  3:01 PM   Wound Arterial 7/19/2018  3:01 PM   Dressing Status Clean;Dry; Intact 7/19/2018 will receive a patient experience survey. We would be grateful if you would take the time to fill this survey out.     Wound care order history:                 JENNIFER's   Right       Left               Date               Vascular studies: ARTERIAL STUDIES COMPLETE  Date 2/22/2018 AND 4/16/18-                                             VASCULAR  INTERVENTION SCHEDULED FOR 7/2/2018                                                     Imaging:   Date               Cultures:   Date 5/3/18 CULTURE OBTAINED; 6/14/18-wound culture postivie scanty growth MRSA-discussed with patient and son-doxycyline RX              Labs/ HbA1c:   Date               Grafts:  Date               HBO:                Antibiotics: 6/14/2018-Rx for Doxycycline 100MG twice daily               Earlier Wound care treatments:  Santyl ordered              Authorizations:                        Consults: DR Durand Antis care physician: DR Lexx Becerra     Continuing wound care orders and information:              Residence: HOME              Continue home health care [id number] [facility]               Your wound-care supplies will be provided by: [facility] provider: [address] [place] [address] with  Rogelio Delacruz loading:  Date               MHDFD Medications:        Wound cleansing:                           ZE not scrub or use excessive force.                          Wash hands with soap and water before and after dressing changes.                           Prior to applying a clean dressing, cleanse wound with normal saline,                                wound cleanser, or mild soap and water.                           Ask the physician or nurse before getting the wound(s) wet in a shower        Daily Wound management:                          Keep weight off wounds and reposition every 2 hours.                          PEXMR standing for long periods of time.                          UJVBK wraps/stockings in AM and remove at bedtime.                          If swelling is present, elevate legs to the level of the heart or above for 30  minutes 4-5 times a day and/or when sitting.                                                  When taking antibiotics take entire prescription as ordered by physician do not stop taking until medicine is all gone.                             Orders for this week: 7/19/2018  FAX TO [facility]     DAILY WOUND CARE TO  LEFT LOWER LEG WOUNDS: CLEANSE WITH MILD SOAP AND WATER, RINSE, PAT DRY, APPLY DAMP SORBACT AND FIBRACOL TO WOUND BED THEN COVER WITH CALCIUM ALGINATE, COVER WITH FLUFFED 4X4 AND SECURE WITH CONFORM AND TAPE. . APPLY TUBI  E. MAY REMOVE TUBIGRIP AT BEDTIME.  CHANGE DRESSING DAILY AND AS NEEDED FOR EXCESSIVE DRAINAGE OR IF BECOMES SOILED                 Follow up with Aidee Armas NP  In 1 week in the wound care center     Call [phone] for any questions or concerns.     Date__________   Time____________      Physician Signature:___________________________                        Date:_______________              Treatment Note Wound 05/03/18 Arterial ulcer # 5 LEFT MEDIAL PROXIMAL LOWER LEG (ONSET A WEEK AGO) (merged with #6 left distal medial leg 7/19/18) -Dressing/Treatment:  (sorbact, fibracol, Ca+ alg, fluffed 4x4, conform,& tubi E)    Written Patient Dismissal Instructions Given            Electronically signed by LES Dejesus CNP on 7/19/2018 at 4:41 PM

## 2018-07-25 ENCOUNTER — TELEPHONE (OUTPATIENT)
Dept: INTERNAL MEDICINE CLINIC | Age: 83
End: 2018-07-25

## 2018-07-25 RX ORDER — WARFARIN SODIUM 2 MG/1
2 TABLET ORAL DAILY
Qty: 30 TABLET | Refills: 0
Start: 2018-07-25 | End: 2018-10-08 | Stop reason: DRUGHIGH

## 2018-07-25 NOTE — TELEPHONE ENCOUNTER
INR sl low, rec take 3 mg today (1.5 tabs of the 2mg tab) then tomorrow back to 2mg daily INCLUDING SUNDAYS, so now 2mg daily after today. Recheck INR w [facility] in one week.     Place med changes/corrections on EPIC medlist please

## 2018-07-26 ENCOUNTER — HOSPITAL ENCOUNTER (OUTPATIENT)
Dept: WOUND CARE | Age: 83
Discharge: HOME | End: 2018-07-26
Attending: NURSE PRACTITIONER | Admitting: NURSE PRACTITIONER

## 2018-07-26 VITALS
HEART RATE: 75 BPM | DIASTOLIC BLOOD PRESSURE: 73 MMHG | RESPIRATION RATE: 16 BRPM | SYSTOLIC BLOOD PRESSURE: 150 MMHG | TEMPERATURE: 97.5 F

## 2018-07-26 DIAGNOSIS — I73.9 PERIPHERAL VASCULAR DISEASE (HCC): ICD-10-CM

## 2018-07-26 DIAGNOSIS — L97.922 CHRONIC ULCER OF LEFT LOWER EXTREMITY WITH FAT LAYER EXPOSED (HCC): Primary | ICD-10-CM

## 2018-07-26 PROCEDURE — 11042 DBRDMT SUBQ TIS 1ST 20SQCM/<: CPT | Performed by: NURSE PRACTITIONER

## 2018-07-26 RX ORDER — LIDOCAINE HYDROCHLORIDE 40 MG/ML
SOLUTION TOPICAL ONCE
Status: DISCONTINUED | OUTPATIENT
Start: 2018-07-26 | End: 2018-07-27 | Stop reason: HOSPADM

## 2018-07-26 ASSESSMENT — PAIN SCALES - GENERAL: PAINLEVEL_OUTOF10: 9

## 2018-07-26 ASSESSMENT — PAIN DESCRIPTION - FREQUENCY: FREQUENCY: CONTINUOUS

## 2018-07-26 ASSESSMENT — PAIN DESCRIPTION - DESCRIPTORS: DESCRIPTORS: DISCOMFORT;SHARP

## 2018-07-26 ASSESSMENT — PAIN DESCRIPTION - PROGRESSION: CLINICAL_PROGRESSION: GRADUALLY WORSENING

## 2018-07-26 ASSESSMENT — PAIN DESCRIPTION - ORIENTATION: ORIENTATION: RIGHT

## 2018-07-26 ASSESSMENT — PAIN DESCRIPTION - LOCATION: LOCATION: ANKLE;FOOT;LEG

## 2018-07-26 ASSESSMENT — PAIN DESCRIPTION - PAIN TYPE: TYPE: ACUTE PAIN

## 2018-07-26 ASSESSMENT — PAIN DESCRIPTION - ONSET: ONSET: ON-GOING

## 2018-07-26 NOTE — PROGRESS NOTES
FAMILY HISTORY    Family History   Problem Relation Age of Onset    Cancer Mother     Early Death Mother     Hearing Loss Mother     Cancer Sister        SOCIAL HISTORY    Social History   Substance Use Topics    Smoking status: Former Smoker     Packs/day: 0.00     Quit date: 1/20/2000    Smokeless tobacco: Former User      Comment: Prior Hx of 1 Pack per week, none currently    Alcohol use Yes      Comment: pt states \"one drink a day\"       ALLERGIES    Allergies   Allergen Reactions    Lisinopril      Cough      Sulfa Antibiotics     Pcn [Penicillins] Rash     Does tolerate keflex       MEDICATIONS    Current Outpatient Prescriptions on File Prior to Encounter   Medication Sig Dispense Refill    warfarin (COUMADIN) 2 MG tablet Take 1 tablet by mouth daily 30 tablet 0    hydrALAZINE (APRESOLINE) 25 MG tablet Take 1 tablet by mouth every 12 hours 60 tablet 0    losartan (COZAAR) 25 MG tablet Take 1 tablet by mouth daily 90 tablet 1    HYDROcodone-acetaminophen (NORCO) 5-325 MG per tablet Take 1 tablet by mouth every 8 hours as needed for Pain. Diania Mutters lovastatin (MEVACOR) 40 MG tablet Take 1 tablet by mouth 2 times daily TAKE ONE (1) TABLET BY MOUTH TWO TIMES DAILY jscript:void(0) 180 tablet 1    meclizine (TRAVEL SICKNESS) 25 MG CHEW Take 1 tablet by mouth every 6 hours as needed (dizziness/vertigo) 60 tablet 1    hydrochlorothiazide (HYDRODIURIL) 12.5 MG tablet Take 1 tablet by mouth three times a week 30 tablet 2    acetaminophen (TYLENOL) 325 MG tablet Take 2 tablets by mouth every 4 hours as needed for Pain 120 tablet 3    cyclobenzaprine (FLEXERIL) 5 MG tablet Take 1 tablet by mouth every evening 90 tablet 1    ranitidine (ZANTAC) 150 MG capsule Take 1 capsule by mouth 2 times daily 180 capsule 1    aspirin EC 81 MG EC tablet Take 81 mg by mouth daily. No current facility-administered medications on file prior to encounter.         REVIEW OF SYSTEMS    Pertinent items are noted in HPI. Constitutional: Negative for systemic symptoms including fever, chills and malaise. Objective:      BP (!) 150/73   Pulse 75   Temp 97.5 °F (36.4 °C) (Temporal)   Resp 16   LMP  (LMP Unknown)     PHYSICAL EXAM    General: The patient is in no acute distress. Mental status:  Patient is appropriate, is  oriented to place and plan of care. 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 Performed by: LES Estrada CNP    Consent obtained: Yes    Time out taken:  Yes    Pain Control: Anesthetic  Anesthetic: 4% Lidocaine Liquid Topical     Debridement:Excisional Debridement    Using #15 blade scalpel the wound(s) was/were sharply debrided down through and including the removal of subcutaneous tissue. Devitalized Tissue Debrided:  biofilm, slough and exudate    Pre Debridement Measurements:  Are located in the Wound Documentation Flow Sheet    All active wounds listed below with today's date are evaluated  Wound(s)    debrided this date include # : 5     Post  Debridement Measurements:  Wound 05/03/18 Arterial ulcer # 5 LEFT MEDIAL PROXIMAL LOWER LEG (ONSET A WEEK AGO) (merged with #6 left distal medial leg 7/19/18)  (Active)   Wound Image   7/19/2018  3:01 PM   Wound Type Wound 7/26/2018  1:27 PM   Wound Arterial 7/26/2018  1:27 PM   Dressing Status Clean;Dry; Intact 7/26/2018  1:52 PM   Dressing Changed Changed/New 7/26/2018  1:52 PM   Dressing/Treatment Dry heart or above for 30  minutes 4-5 times a day and/or when sitting.                                                  When taking antibiotics take entire prescription as ordered by physician do not stop taking until medicine is all gone.                             Orders for this week: 7/26/2018  FAX TO [facility]     DAILY WOUND CARE TO  LEFT LOWER LEG WOUNDS: CLEANSE WITH MILD SOAP AND WATER, RINSE, PAT DRY, APPLY DAMP SORBACT AND FIBRACOL TO WOUND BED THEN COVER WITH CALCIUM ALGINATE, COVER WITH FLUFFED 4X4 AND SECURE WITH CONFORM AND TAPE. . APPLY TUBI  E. MAY REMOVE TUBIGRIP AT BEDTIME.  CHANGE DRESSING DAILY AND AS NEEDED FOR EXCESSIVE DRAINAGE OR IF BECOMES SOILED                 Follow up with Armen Jennings NP  In 1 week in the wound care center     Call [phone] for any questions or concerns.     Date__________   Time____________      Physician Signature:___________________________                        Date:_______________                 Treatment Note Wound 05/03/18 Arterial ulcer # 5 LEFT MEDIAL PROXIMAL LOWER LEG (ONSET A WEEK AGO) (merged with #6 left distal medial leg 7/19/18) -Dressing/Treatment:  (sorbact, fibracol, Ca+ alg, fluffed 4x4, conform,& tubi E)    Written Patient Dismissal Instructions Given            Electronically signed by LES Matute CNP on 7/26/2018 at 2:36 PM

## 2018-07-26 NOTE — PLAN OF CARE
Problem: Wound:  Intervention: Assess pain status  See flow sheet. Intervention: Assess wound size, appearance and drainage  See flow sheet. Intervention: Assess pedal pulses bilaterally if patient has a foot or leg ulcer  See flow sheet. Intervention: Doppler if unable to palpate pedal pulse  See flow sheet.

## 2018-08-01 ENCOUNTER — TELEPHONE (OUTPATIENT)
Dept: INTERNAL MEDICINE CLINIC | Age: 83
End: 2018-08-01

## 2018-08-02 ENCOUNTER — HOSPITAL ENCOUNTER (OUTPATIENT)
Dept: WOUND CARE | Age: 83
Discharge: HOME | End: 2018-08-02
Attending: NURSE PRACTITIONER | Admitting: NURSE PRACTITIONER

## 2018-08-02 VITALS — DIASTOLIC BLOOD PRESSURE: 81 MMHG | HEART RATE: 75 BPM | SYSTOLIC BLOOD PRESSURE: 167 MMHG | TEMPERATURE: 97.3 F

## 2018-08-02 DIAGNOSIS — L97.922 CHRONIC ULCER OF LEFT LOWER EXTREMITY WITH FAT LAYER EXPOSED (HCC): Primary | ICD-10-CM

## 2018-08-02 PROCEDURE — 11042 DBRDMT SUBQ TIS 1ST 20SQCM/<: CPT | Performed by: NURSE PRACTITIONER

## 2018-08-02 RX ORDER — LIDOCAINE HYDROCHLORIDE 40 MG/ML
SOLUTION TOPICAL ONCE
Status: DISCONTINUED | OUTPATIENT
Start: 2018-08-02 | End: 2018-08-03 | Stop reason: HOSPADM

## 2018-08-02 NOTE — PROGRESS NOTES
present, elevate legs to the level of the heart or above for 30  minutes 4-5 times a day and/or when sitting.                                                  When taking antibiotics take entire prescription as ordered by physician do not stop taking until medicine is all gone.                             Orders for this week: 8/2/2018  FAX TO [facility]     DAILY WOUND CARE TO  LEFT LOWER LEG WOUNDS: CLEANSE WITH MILD SOAP AND WATER, RINSE, PAT DRY, APPLY DAMP SORBACT AND FIBRACOL TO WOUND BED THEN COVER WITH CALCIUM ALGINATE, COVER WITH FLUFFED 4X4 AND SECURE WITH CONFORM AND TAPE. . APPLY TUBI  E. MAY REMOVE TUBIGRIP AT BEDTIME.  CHANGE DRESSING DAILY AND AS NEEDED FOR EXCESSIVE DRAINAGE OR IF BECOMES SOILED                 Follow up with Chico Andrews NP  In 1 week in the wound care center     Call [phone] for any questions or concerns.     Date__________   Time____________      Physician Signature:___________________________                        Date:_______________                   Treatment Note Wound 05/03/18 Arterial ulcer # 5 LEFT MEDIAL PROXIMAL LOWER LEG (ONSET A WEEK AGO) (merged with #6 left distal medial leg 7/19/18) -Dressing/Treatment:  (Sorbact, fibracol, calcium algoinate, 4x4, conform tape tubi)    Written Patient Dismissal Instructions Given            Electronically signed by LES Meadows CNP on 8/2/2018 at 4:37 PM

## 2018-08-09 ENCOUNTER — HOSPITAL ENCOUNTER (OUTPATIENT)
Dept: WOUND CARE | Age: 83
Discharge: HOME | End: 2018-08-09
Attending: NURSE PRACTITIONER | Admitting: NURSE PRACTITIONER

## 2018-08-09 VITALS
HEART RATE: 77 BPM | RESPIRATION RATE: 16 BRPM | SYSTOLIC BLOOD PRESSURE: 119 MMHG | TEMPERATURE: 97 F | DIASTOLIC BLOOD PRESSURE: 61 MMHG

## 2018-08-09 DIAGNOSIS — L97.922 CHRONIC ULCER OF LEFT LOWER EXTREMITY WITH FAT LAYER EXPOSED (HCC): Primary | ICD-10-CM

## 2018-08-09 DIAGNOSIS — I73.9 PERIPHERAL VASCULAR DISEASE (HCC): ICD-10-CM

## 2018-08-09 PROCEDURE — 11042 DBRDMT SUBQ TIS 1ST 20SQCM/<: CPT | Performed by: NURSE PRACTITIONER

## 2018-08-09 RX ORDER — LIDOCAINE HYDROCHLORIDE 40 MG/ML
SOLUTION TOPICAL ONCE
Status: DISCONTINUED | OUTPATIENT
Start: 2018-08-09 | End: 2018-08-10 | Stop reason: HOSPADM

## 2018-08-09 NOTE — PROGRESS NOTES
Wound Care Center Progress Note with Procedure Note    Kaitlynn Fonseca  AGE: 80 y.o. GENDER: female  : 1922  EPISODE DATE:  2018     Subjective:     Chief Complaint   Patient presents with    Wound Check     Left Lower Leg         HISTORY of PRESENT ILLNESS      Kaitlynn Fonseca is a 80 y.o. female who presents today for wound evaluation of Chronic venous and arterial wound(s) of the left lower leg. The wound is of moderate severity. The underlying cause of the wound is venous and arterial disease. She had revascularization last month. Wound has had mild improvement. Attempted compression wrap with profore lite today, but patient had it removed before leaving the office due to discomfort. Will apply for puraply, nusheild, and affinity.   Wound Pain Timing/Severity: intermittent  Quality of pain: tender  Severity of pain:  2 / 10   Modifying Factors: edema, venous stasis and arterial insufficiency  Associated Signs/Symptoms: none        PAST MEDICAL HISTORY        Diagnosis Date    AAA (abdominal aortic aneurysm) (HCC)     Basosquamous carcinoma     L leg - Dr Paulina Hamman COPD (chronic obstructive pulmonary disease) ([address]     CVA (cerebral vascular accident) ([address]     L parietal w speech deficit  ~, sx resolved    Gastro-esophageal reflux disease with esophagitis     Gout     hand swelling and pain    Hyperlipidemia     Non-healing surgical wound     Osteoarthritis     Osteoarthritis, generalized     on chr pred started by Rheum    Peripheral vascular disease ([address] 2008, 2008    S/P PTA and stents X 2 to Right leg- Dr Merna Gilmore Thoracic aortic aneurysm ([address]     Venous stasis ulcer ([address] 2012       PAST SURGICAL HISTORY    Past Surgical History:   Procedure Laterality Date    APPENDECTOMY      ARTERY SURGERY      STENT TO LEFT FEMORIAL AND ALSO TO RIGHT SUPRA FEMORIAL ARTERY    FRACTURE SURGERY      Left ???   -ORIF    HYSTERECTOMY, TOTAL ABDOMINAL      age 27-for evening 90 tablet 1    ranitidine (ZANTAC) 150 MG capsule Take 1 capsule by mouth 2 times daily 180 capsule 1    aspirin EC 81 MG EC tablet Take 81 mg by mouth daily. No current facility-administered medications on file prior to encounter. REVIEW OF SYSTEMS    Pertinent items are noted in HPI. Constitutional: Negative for systemic symptoms including fever, chills and malaise. Objective:      /61   Pulse 77   Temp 97 °F (36.1 °C) (Temporal)   Resp 16   LMP  (LMP Unknown)     PHYSICAL EXAM    General: The patient is in no acute distress. Mental status:  Patient is appropriate, is  oriented to place and plan of care. 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 Performed by: LES [place] - SUZY    Consent obtained: Yes    Time out taken:  Yes    Pain Control: Anesthetic  Anesthetic: 4% Lidocaine Liquid Topical     Debridement:Excisional Debridement    Using #15 blade scalpel the wound(s) was/were sharply debrided down through and including the removal of subcutaneous tissue.         Devitalized Tissue Debrided:  biofilm, slough and exudate    Pre Debridement Measurements:  Are located in the Wound Documentation Flow Sheet    All active wounds listed below with today's date are evaluated  Wound(s)    debrided this date include # : 5     Post  Debridement Measurements:  Wound 05/03/18 Arterial ulcer # 5 LEFT MEDIAL PROXIMAL LOWER LEG (ONSET A WEEK AGO) (merged with #6 left

## 2018-08-13 DIAGNOSIS — R42 VERTIGO: ICD-10-CM

## 2018-08-13 RX ORDER — MECLIZINE HYDROCHLORIDE CHEWABLE TABLETS 25 MG/1
25 TABLET, CHEWABLE ORAL EVERY 6 HOURS PRN
Qty: 60 TABLET | Refills: 1 | Status: SHIPPED | OUTPATIENT
Start: 2018-08-13 | End: 2018-10-12 | Stop reason: SDUPTHER

## 2018-08-16 ENCOUNTER — HOSPITAL ENCOUNTER (OUTPATIENT)
Dept: WOUND CARE | Age: 83
Discharge: HOME | End: 2018-08-16
Attending: NURSE PRACTITIONER | Admitting: NURSE PRACTITIONER

## 2018-08-16 VITALS
TEMPERATURE: 97.1 F | RESPIRATION RATE: 20 BRPM | DIASTOLIC BLOOD PRESSURE: 69 MMHG | HEART RATE: 75 BPM | SYSTOLIC BLOOD PRESSURE: 145 MMHG

## 2018-08-16 DIAGNOSIS — L97.922 CHRONIC ULCER OF LEFT LOWER EXTREMITY WITH FAT LAYER EXPOSED (HCC): Primary | ICD-10-CM

## 2018-08-16 PROCEDURE — 11042 DBRDMT SUBQ TIS 1ST 20SQCM/<: CPT | Performed by: NURSE PRACTITIONER

## 2018-08-16 RX ORDER — LIDOCAINE HYDROCHLORIDE 40 MG/ML
SOLUTION TOPICAL ONCE
Status: DISCONTINUED | OUTPATIENT
Start: 2018-08-16 | End: 2018-08-17 | Stop reason: HOSPADM

## 2018-08-16 ASSESSMENT — PAIN DESCRIPTION - FREQUENCY: FREQUENCY: CONTINUOUS

## 2018-08-16 ASSESSMENT — PAIN DESCRIPTION - PAIN TYPE: TYPE: CHRONIC PAIN

## 2018-08-16 ASSESSMENT — PAIN DESCRIPTION - ORIENTATION: ORIENTATION: LEFT

## 2018-08-16 ASSESSMENT — PAIN SCALES - GENERAL: PAINLEVEL_OUTOF10: 7

## 2018-08-16 ASSESSMENT — PAIN DESCRIPTION - PROGRESSION: CLINICAL_PROGRESSION: NOT CHANGED

## 2018-08-16 ASSESSMENT — PAIN DESCRIPTION - DESCRIPTORS: DESCRIPTORS: BURNING;THROBBING

## 2018-08-16 ASSESSMENT — PAIN DESCRIPTION - ONSET: ONSET: ON-GOING

## 2018-08-16 ASSESSMENT — PAIN DESCRIPTION - LOCATION: LOCATION: ANKLE;FOOT;LEG

## 2018-08-16 NOTE — PROGRESS NOTES
daily. No current facility-administered medications on file prior to encounter. REVIEW OF SYSTEMS    Pertinent items are noted in HPI. Constitutional: Negative for systemic symptoms including fever, chills and malaise. Objective:      BP (!) 145/69   Pulse 75   Temp 97.1 °F (36.2 °C) (Temporal)   Resp 20   LMP  (LMP Unknown)     PHYSICAL EXAM    General: The patient is in no acute distress. Mental status:  Patient is appropriate, is  oriented to place and plan of care. 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 Performed by: LES Da Silva - CNP    Consent obtained: Yes    Time out taken:  Yes    Pain Control: Anesthetic  Anesthetic: 4% Lidocaine Liquid Topical     Debridement:Excisional Debridement    Using #15 blade scalpel the wound(s) was/were sharply debrided down through and including the removal of subcutaneous tissue.         Devitalized Tissue Debrided:  biofilm and exudate    Pre Debridement Measurements:  Are located in the Wound Documentation Flow Sheet    All active wounds listed below with today's date are evaluated  Wound(s)    debrided this date include # : 5     Post  Debridement Measurements:  Wound 05/03/18 Arterial ulcer # 5 LEFT MEDIAL PROXIMAL LOWER LEG (ONSET A WEEK AGO) (merged with #6 left distal medial leg 7/19/18)  (Active)   Wound Image   8/16/2018  1:37 PM   Wound Type Wound 8/16/2018  1:37 PM   Wound Arterial 8/2/2018  1:27 PM   Dressing Status Clean;Dry; Intact 8/16/2018  2:43 PM   Dressing Changed Changed/New 8/16/2018  2:43 PM   Wound Cleansed Rinsed/Irrigated with saline 8/16/2018  1:37 PM   Wound Length (cm) 4 cm 8/16/2018  2:24 PM   Wound Width (cm) 2.4 cm 8/16/2018  2:24 PM   Wound Depth (cm)  0.1 8/16/2018  2:24 PM   Calculated Wound Size (cm^2) (l*w) 9.6 cm^2 8/16/2018  2:24 PM   Change in Wound Size % (l*w) -1645.45 8/16/2018  2:24 PM   Distance Tunneling (cm) 0 cm 8/16/2018  1:37 PM   Tunneling Position ___ O'Clock 0 8/16/2018  1:37 PM   Undermining Starts ___ O'Clock 0 8/16/2018  1:37 PM   Undermining Ends___ O'Clock 0 8/16/2018  1:37 PM   Undermining Maxium Distance (cm) 0 8/16/2018  1:37 PM   Wound Assessment Red;Yellow 8/16/2018  1:37 PM   Drainage Amount Large 8/16/2018  1:37 PM   Drainage Description Serosanguinous; Yellow 8/16/2018  1:37 PM   Odor None 8/16/2018  1:37 PM   Margins Defined edges 8/16/2018  1:37 PM   Supriya-wound Assessment Dry 8/16/2018  1:37 PM   Non-staged Wound Description Full thickness 8/16/2018  1:37 PM   [place]%Wound Bed 0 8/16/2018  1:37 PM   Red%Wound Bed 50 8/16/2018  1:37 PM   Yellow%Wound Bed 50 8/16/2018  1:37 PM   Black%Wound Bed 0 8/16/2018  1:37 PM   Purple%Wound Bed 0 8/16/2018  1:37 PM   Other%Wound Bed 0 8/2/2018  1:27 PM   Debridement per physician Subcutaneous 6/14/2018  1:10 PM   Number of days: 105       Wound 08/16/18 # 7 LEFT ANTERIOR LOWER LEG (ONSET ONE WEEK AGO) (Active)   Wound Image   8/16/2018  1:37 PM   Dressing Status Clean;Dry; Intact 8/16/2018  2:43 PM   Dressing Changed Changed/New 8/16/2018  2:43 PM   Wound Cleansed Rinsed/Irrigated with saline 8/16/2018  1:37 PM   Wound Length (cm) 0.3 cm 8/16/2018  1:37 PM   Wound Width (cm) 0.2 cm 8/16/2018  1:37 PM   Wound Depth (cm)  0.1 8/16/2018  1:37 PM   Calculated Wound Size (cm^2) (l*w) 0.06 cm^2 8/16/2018  1:37 PM   Distance Tunneling (cm) 0 cm 8/16/2018  1:37 PM   Tunneling Position ___ O'Clock 0 8/16/2018  1:37 PM   Undermining Starts ___ O'Clock for Puraply               HBO:                Antibiotics: 6/14/2018-Rx for Doxycycline 100MG twice daily -completed              Earlier Wound care treatments:  Santyl ordered              Authorizations:                        Consults: DR Leila Carvajal care physician: DR Morenita Jacob     Continuing wound care orders and information:              Residence: HOME              Continue home health care [address]               Your wound-care supplies will be provided by: [facility] provider: [facility] with  Jeni Race loading:  Date               IGGXV Medications:        Wound cleansing:                           MO not scrub or use excessive force.                          Wash hands with soap and water before and after dressing changes.                         Prior to applying a clean dressing, cleanse wound with normal saline,                                wound cleanser, or mild soap and water.                           Ask the physician or nurse before getting the wound(s) wet in a shower        Daily Wound management:                          Keep weight off wounds and reposition every 2 hours.                          Avoid standing for long periods of time.                          Apply wraps/stockings in AM and remove at bedtime.                          If swelling is present, elevate legs to the level of the heart or above for 30  minutes 4-5 times a day and/or when sitting.                                                  When taking antibiotics take entire prescription as ordered by physician do not stop taking until medicine is all gone.                             Orders for this week: 8/20/2018  FAX TO [facility]      DAILY WOUND CARE TO  LEFT LOWER LEG WOUNDS: CLEANSE WITH MILD SOAP AND WATER, RINSE, PAT DRy, APPLY DAMP CHICHI TO WOUND BED THEN APPLY CALCIUM ALGINATE, COVER WITH FLUFFED 4X4'S THEN CONFORM AND SECURE WITH TAPE. APPLY TUBI  E. CHANGE DRESSING DAILY AND AS NEEDED  IF DRESSING FALLS OFF OR BECOMES SOILED.          Follow up with Rupa Robertson NP  In 1 week in the wound care center     Call [phone] for any questions or concerns.     Date__________   Time____________      Physician Signature:___________________________                        Date:_______________                          Treatment Note Wound 08/16/18 # 7 LEFT ANTERIOR LOWER LEG (ONSET ONE WEEK AGO)-Dressing/Treatment:  (bandaid)  Wound 05/03/18 Arterial ulcer # 5 LEFT MEDIAL PROXIMAL LOWER LEG (ONSET A WEEK AGO) (merged with #6 left distal medial leg 7/19/18) -Dressing/Treatment:  (cookie, ca alginate, 4x4, conform, tubi E)    Written Patient Dismissal Instructions Given            Electronically signed by LES Bejarano CNP on 8/16/2018 at 3:19 PM

## 2018-08-16 NOTE — PLAN OF CARE
Problem: Wound:  Intervention: Assess pain status  See flowsheet  Intervention: Assess wound size, appearance and drainage  See flowsheet  Intervention: Assess pedal pulses bilaterally if patient has a foot or leg ulcer  See flowsheet  Intervention: Doppler if unable to palpate pedal pulse  See flowsheet    Goal: Will show signs of wound healing; wound closure and no evidence of infection  Will show signs of wound healing; wound closure and no evidence of infection   Outcome: Ongoing

## 2018-08-17 ENCOUNTER — ANTI-COAG VISIT (OUTPATIENT)
Dept: INTERNAL MEDICINE CLINIC | Age: 83
End: 2018-08-17

## 2018-08-17 ENCOUNTER — OFFICE VISIT (OUTPATIENT)
Dept: INTERNAL MEDICINE CLINIC | Age: 83
End: 2018-08-17

## 2018-08-17 VITALS
DIASTOLIC BLOOD PRESSURE: 74 MMHG | RESPIRATION RATE: 14 BRPM | HEART RATE: 68 BPM | WEIGHT: 125 LBS | OXYGEN SATURATION: 97 % | SYSTOLIC BLOOD PRESSURE: 128 MMHG | BODY MASS INDEX: 20.18 KG/M2

## 2018-08-17 DIAGNOSIS — I87.311 IDIOPATHIC CHRONIC VENOUS HYPERTENSION OF RIGHT LOWER EXTREMITY WITH ULCER (HCC): ICD-10-CM

## 2018-08-17 DIAGNOSIS — K21.00 GASTRO-ESOPHAGEAL REFLUX DISEASE WITH ESOPHAGITIS: ICD-10-CM

## 2018-08-17 DIAGNOSIS — E78.2 MIXED HYPERLIPIDEMIA: ICD-10-CM

## 2018-08-17 DIAGNOSIS — I10 ESSENTIAL HYPERTENSION: ICD-10-CM

## 2018-08-17 DIAGNOSIS — M15.9 OSTEOARTHRITIS, GENERALIZED: ICD-10-CM

## 2018-08-17 DIAGNOSIS — I70.242 ATHEROSCLEROSIS OF NATIVE ARTERY OF BOTH LOWER EXTREMITIES WITH BILATERAL ULCERATION OF CALVES (HCC): ICD-10-CM

## 2018-08-17 DIAGNOSIS — R60.9 PERIPHERAL EDEMA: ICD-10-CM

## 2018-08-17 DIAGNOSIS — D68.9 COAGULATION DEFICIENCY (HCC): ICD-10-CM

## 2018-08-17 DIAGNOSIS — L97.919 IDIOPATHIC CHRONIC VENOUS HYPERTENSION OF RIGHT LOWER EXTREMITY WITH ULCER (HCC): ICD-10-CM

## 2018-08-17 DIAGNOSIS — I70.232 ATHEROSCLEROSIS OF NATIVE ARTERY OF BOTH LOWER EXTREMITIES WITH BILATERAL ULCERATION OF CALVES (HCC): ICD-10-CM

## 2018-08-17 DIAGNOSIS — I63.50 CEREBROVASCULAR ACCIDENT (CVA) DUE TO STENOSIS OF CEREBRAL ARTERY (HCC): Primary | ICD-10-CM

## 2018-08-17 LAB
INTERNATIONAL NORMALIZATION RATIO, POC: 2.1
PROTHROMBIN TIME, POC: 28.7

## 2018-08-17 PROCEDURE — 99214 OFFICE O/P EST MOD 30 MIN: CPT | Performed by: INTERNAL MEDICINE

## 2018-08-17 PROCEDURE — 4040F PNEUMOC VAC/ADMIN/RCVD: CPT | Performed by: INTERNAL MEDICINE

## 2018-08-17 PROCEDURE — G8420 CALC BMI NORM PARAMETERS: HCPCS | Performed by: INTERNAL MEDICINE

## 2018-08-17 PROCEDURE — 1123F ACP DISCUSS/DSCN MKR DOCD: CPT | Performed by: INTERNAL MEDICINE

## 2018-08-17 PROCEDURE — G8427 DOCREV CUR MEDS BY ELIG CLIN: HCPCS | Performed by: INTERNAL MEDICINE

## 2018-08-17 PROCEDURE — G8598 ASA/ANTIPLAT THER USED: HCPCS | Performed by: INTERNAL MEDICINE

## 2018-08-17 PROCEDURE — 85610 PROTHROMBIN TIME: CPT | Performed by: INTERNAL MEDICINE

## 2018-08-17 PROCEDURE — 1036F TOBACCO NON-USER: CPT | Performed by: INTERNAL MEDICINE

## 2018-08-17 PROCEDURE — 1101F PT FALLS ASSESS-DOCD LE1/YR: CPT | Performed by: INTERNAL MEDICINE

## 2018-08-17 PROCEDURE — 1090F PRES/ABSN URINE INCON ASSESS: CPT | Performed by: INTERNAL MEDICINE

## 2018-08-17 RX ORDER — LOSARTAN POTASSIUM 25 MG/1
25 TABLET ORAL DAILY
Qty: 90 TABLET | Refills: 1 | Status: SHIPPED | OUTPATIENT
Start: 2018-08-17 | End: 2018-11-12 | Stop reason: SDUPTHER

## 2018-08-17 RX ORDER — HYDROCODONE BITARTRATE AND ACETAMINOPHEN 5; 325 MG/1; MG/1
1 TABLET ORAL EVERY 6 HOURS PRN
Qty: 28 TABLET | Refills: 0 | Status: SHIPPED | OUTPATIENT
Start: 2018-08-17 | End: 2018-09-18

## 2018-08-17 RX ORDER — PREDNISONE 1 MG/1
5 TABLET ORAL DAILY
Qty: 90 TABLET | Refills: 1 | Status: SHIPPED | OUTPATIENT
Start: 2018-08-17 | End: 2018-11-12 | Stop reason: SDUPTHER

## 2018-08-17 RX ORDER — HYDROCHLOROTHIAZIDE 12.5 MG/1
12.5 TABLET ORAL
Qty: 36 TABLET | Refills: 2 | Status: SHIPPED | OUTPATIENT
Start: 2018-08-17 | End: 2019-01-09 | Stop reason: SDUPTHER

## 2018-08-17 RX ORDER — CYCLOBENZAPRINE HCL 5 MG
5 TABLET ORAL EVERY EVENING
Qty: 90 TABLET | Refills: 1 | Status: SHIPPED | OUTPATIENT
Start: 2018-08-17 | End: 2018-11-12 | Stop reason: SDUPTHER

## 2018-08-17 RX ORDER — RANITIDINE 150 MG/1
150 CAPSULE ORAL 2 TIMES DAILY
Qty: 180 CAPSULE | Refills: 1 | Status: SHIPPED | OUTPATIENT
Start: 2018-08-17 | End: 2018-10-02 | Stop reason: SDUPTHER

## 2018-08-17 RX ORDER — HYDRALAZINE HYDROCHLORIDE 25 MG/1
25 TABLET, FILM COATED ORAL EVERY 12 HOURS SCHEDULED
Qty: 180 TABLET | Refills: 1 | Status: SHIPPED | OUTPATIENT
Start: 2018-08-17 | End: 2018-09-18 | Stop reason: DRUGHIGH

## 2018-08-17 RX ORDER — LOVASTATIN 40 MG/1
40 TABLET ORAL 2 TIMES DAILY
Qty: 180 TABLET | Refills: 1 | Status: SHIPPED | OUTPATIENT
Start: 2018-08-17 | End: 2018-11-12 | Stop reason: SDUPTHER

## 2018-08-17 NOTE — PROGRESS NOTES
Mika Dimas  5/30/1922 08/17/18    SUBJECTIVE:    L leg vasc wound, seeing wound clinic and cont debridement, may need skin grafting per family. CVA- INR ok at 2.1 today. Chr dizziness noted, stable. OA, on chr pred low dose and helps w sx. Also on norco prn, Controlled substances monitoring: possible medication side effects, risk of tolerance and/or dependence, and alternative treatments discussed, no signs of potential drug abuse or diversion identified and [place] report reviewed today- activity consistent with treatment plan. Last filled 6/6      HTN- some recurring cough noted on losartan, but tolerable and BP good range. GERD:  Is without sx of heartburn or dysphagia, abd pain. Lipids:  Is continuing statin therapy and low fat diet. Tolerating medications w/o myalgias or GI upset. OBJECTIVE:    /74   Pulse 68   Resp 14   Wt 125 lb (56.7 kg)   LMP  (LMP Unknown)   SpO2 97%   BMI 20.18 kg/m²     Physical Exam   Constitutional: She appears well-developed and well-nourished. Neck: Neck supple. Cardiovascular: Normal rate, regular rhythm and normal heart sounds. Exam reveals no gallop and no friction rub. No murmur heard. Pulmonary/Chest: Effort normal and breath sounds normal. No respiratory distress. She has no wheezes. She has no rales. Abdominal: Soft. Bowel sounds are normal. She exhibits no distension. There is no tenderness. Musculoskeletal: She exhibits no edema. Neurological: She is alert. Psychiatric: She has a normal mood and affect. Vitals reviewed. ASSESSMENT:    1. Cerebrovascular accident (CVA) due to stenosis of cerebral artery ([address]    2. Mixed hyperlipidemia    3. Osteoarthritis, generalized    4. Essential hypertension    5. Gastro-esophageal reflux disease with esophagitis    6. Peripheral edema    7. Coagulation deficiency ([address]    8. 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 Diagnoses and all orders for this visit:    Cerebrovascular accident (CVA) due to stenosis of cerebral artery ([facility] - Remains stable neurologically w/o evidence of acute changes/focal deficits. Cont regimen. Mixed hyperlipidemia - cont statin and current diet, due for f/u lab next appt  -     lovastatin (MEVACOR) 40 MG tablet; Take 1 tablet by mouth 2 times daily TAKE ONE (1) TABLET BY MOUTH TWO TIMES DAILY jscript:void(0)    Osteoarthritis, generalized - LBP stable on pain regimen, RFs given as noted and will monitor.    -     cyclobenzaprine (FLEXERIL) 5 MG tablet; Take 1 tablet by mouth every evening  -     predniSONE (DELTASONE) 5 MG tablet; Take 1 tablet by mouth daily  -     HYDROcodone-acetaminophen (NORCO) 5-325 MG per tablet; Take 1 tablet by mouth every 6 hours as needed for Pain for up to 7 days. .    Essential hypertension - The current medical regimen is effective;  continue present plan and medications. -     hydrALAZINE (APRESOLINE) 25 MG tablet; Take 1 tablet by mouth every 12 hours  -     losartan (COZAAR) 25 MG tablet; Take 1 tablet by mouth daily    Gastro-esophageal reflux disease with esophagitis - GERD controlled on meds and will refill, monitor for any recurrent or worsening sx.    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

## 2018-08-20 LAB
CULTURE: NORMAL
Lab: NORMAL
ORGANISM: NORMAL
REPORT STATUS: NORMAL
SPECIMEN: NORMAL

## 2018-08-30 ENCOUNTER — HOSPITAL ENCOUNTER (OUTPATIENT)
Dept: WOUND CARE | Age: 83
Discharge: HOME | End: 2018-08-30
Attending: NURSE PRACTITIONER | Admitting: NURSE PRACTITIONER

## 2018-08-30 VITALS
DIASTOLIC BLOOD PRESSURE: 82 MMHG | RESPIRATION RATE: 16 BRPM | TEMPERATURE: 97.2 F | SYSTOLIC BLOOD PRESSURE: 175 MMHG | HEART RATE: 80 BPM

## 2018-08-30 DIAGNOSIS — L97.922 CHRONIC ULCER OF LEFT LOWER EXTREMITY WITH FAT LAYER EXPOSED (HCC): Primary | ICD-10-CM

## 2018-08-30 PROCEDURE — 15271 SKIN SUB GRAFT TRNK/ARM/LEG: CPT | Performed by: NURSE PRACTITIONER

## 2018-08-30 RX ORDER — LIDOCAINE HYDROCHLORIDE 40 MG/ML
SOLUTION TOPICAL ONCE
Status: DISCONTINUED | OUTPATIENT
Start: 2018-08-30 | End: 2018-08-31 | Stop reason: HOSPADM

## 2018-08-30 ASSESSMENT — PAIN DESCRIPTION - ONSET: ONSET: ON-GOING

## 2018-08-30 ASSESSMENT — PAIN DESCRIPTION - ORIENTATION: ORIENTATION: LEFT

## 2018-08-30 ASSESSMENT — PAIN DESCRIPTION - FREQUENCY: FREQUENCY: CONTINUOUS

## 2018-08-30 ASSESSMENT — PAIN DESCRIPTION - PAIN TYPE: TYPE: CHRONIC PAIN

## 2018-08-30 ASSESSMENT — PAIN DESCRIPTION - LOCATION: LOCATION: LEG

## 2018-08-30 ASSESSMENT — PAIN DESCRIPTION - DESCRIPTORS: DESCRIPTORS: BURNING;THROBBING

## 2018-08-30 ASSESSMENT — PAIN DESCRIPTION - PROGRESSION: CLINICAL_PROGRESSION: NOT CHANGED

## 2018-08-30 NOTE — PROGRESS NOTES
Wound Care Center Progress Note and Bioengineered Skin Application      Jonathan Cristobal  AGE: 80 y.o. GENDER: female  : 1922  EPISODE DATE:  2018     Subjective:     Chief Complaint   Patient presents with    Wound Check     left lower leg         HISTORY of PRESENT ILLNESS      Jonathan Cristobal is a 80 y.o. female who presents today for wound evaluation of Chronic venous and arterial wound(s) of left lower leg. The wound is of moderate severity. The underlying cause of the wound is venous and arterial. If appropriate skin graft will be applied.   Wound Pain Timing/Severity: intermittent  Quality of pain: tender  Severity of pain:  2 / 10   Modifying Factors: edema, venous stasis and arterial insufficiency  Associated Signs/Symptoms: none        PAST MEDICAL HISTORY        Diagnosis Date    AAA (abdominal aortic aneurysm) (HCC)     Basosquamous carcinoma     L leg - Dr Lena Hunter COPD (chronic obstructive pulmonary disease) ([address]     CVA (cerebral vascular accident) ([address]     L parietal w speech deficit  ~, sx resolved    Gastro-esophageal reflux disease with esophagitis     Gout     hand swelling and pain    Hyperlipidemia     Non-healing surgical wound     Osteoarthritis     Osteoarthritis, generalized     on chr pred started by Rheum    Peripheral vascular disease ([address] 2008, 2008    S/P PTA and stents X 2 to Right leg- Dr Royce Henriquez Thoracic aortic aneurysm ([address]     Venous stasis ulcer ([address] 2012       PAST SURGICAL HISTORY    Past Surgical History:   Procedure Laterality Date    APPENDECTOMY      ARTERY SURGERY      STENT TO LEFT FEMORIAL AND ALSO TO RIGHT SUPRA FEMORIAL ARTERY    FRACTURE SURGERY      Left ???   -ORIF    HYSTERECTOMY, TOTAL ABDOMINAL  1949    age 27-for fibroids    LEG SURGERY      2008-Right leg- PTA and Stent for PVD - Dr Nba Sullivan:   2008- Right leg -PTA and Stent- Dr Britta Mejía  12    L basosquamous cell CA L leg    HYDROcodone-acetaminophen (NORCO) 5-325 MG per tablet Take 1 tablet by mouth every 8 hours as needed for Pain. Jacque Glatter acetaminophen (TYLENOL) 325 MG tablet Take 2 tablets by mouth every 4 hours as needed for Pain 120 tablet 3     No current facility-administered medications on file prior to encounter. REVIEW OF SYSTEMS    Pertinent items are noted in HPI. Constitutional: Negative for systemic symptoms including fever, chills and malaise. Objective:      BP (!) 175/82   Pulse 80   Temp 97.2 °F (36.2 °C) (Temporal)   Resp 16   LMP  (LMP Unknown)     PHYSICAL EXAM    General: The patient is in no acute distress. Mental status:  Patient is appropriate, is  oriented to place and plan of care. 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 SKIN SUBSTITUTES/GRAFT APPLICATIONS PROCEDURE NOTE    Indicaton:     Chronic ulcer(s) of the left lower leg  that has(have) failed to heal with the usual wound protocol used for greater than 30 days. See Epic chart for previous modalities and details of previous treatment. The patient has no contraindications or evidence of infection. The patient's competency and support system is appropriate for proper care and follow up for the use of this graft, therefore a graft was placed as below. Procedure: The wound bed was cleansed and prepared as necessary to accept the graft. Debridement was required.     Consent obtained: Yes    Time out taken: Yes    Using #15 blade scalpel sharp Excisional Debridement of the wound(s) was/were performed Supriya-wound Assessment Clean 8/30/2018  1:04 PM   Non-staged Wound Description Not applicable 2/11/5430  9:57 PM   [place]%Wound Bed 0 8/30/2018  1:04 PM   Red%Wound Bed 0 8/30/2018  1:04 PM   Yellow%Wound Bed 0 8/30/2018  1:04 PM   Black%Wound Bed 0 8/30/2018  1:04 PM   Purple%Wound Bed 0 8/30/2018  1:04 PM   Other%Wound Bed 100 brown 8/30/2018  1:04 PM   Number of days: 14         Total Surface Area Covered 6.6 sq/cm    Was the Product Layered  Yes      Amount Wasted 0 sq/cm     Secured: Yes    Instruments used to complete placement of graft::#15 blade scalpel    Secured With:   [] N/A  [x]Steri Strips    []Sutures     []Staples [x]Other Mepilex    Procedural Pain: 0/10     Post Procedural Pain: 0 / 10    Response to Treatment:  Well tolerated by patient. Status of wound progress and description from last visit:   Slightly smaller in size. Plan:     Discharge instructions:  Discharge Instructions       PHYSICIAN ORDERS AND DISCHARGE INSTRUCTIONS     NOTE: Upon discharge from the [address], you will receive a patient experience survey.  We would be grateful if you would take the time to fill this survey out.     Wound care order history:                               Vascular studies: ARTERIAL STUDIES COMPLETE  Date 2/22/2018 AND 4/16/18-                                             VASCULAR  INTERVENTION SCHEDULED FOR 7/2/2018                                                     Imaging:   Date               Cultures:   Date 5/3/18 CULTURE OBTAINED; 6/14/18-wound culture postivie scanty growth MRSA-discussed with patient and son-doxycyline RX                                  8/16/18: Culture of left lower leg medial wound obtained              Labs/ HbA1c:   Date               Grafts: 8/30/2018: Puraply #1 Lot #[id number]               HBO:                Antibiotics:               DFSWGPU Wound care treatments:  Santyl ordered              Authorizations:                        Consults:  Polly Bishop                           Primary care physician: DR Sharron Low     Continuing wound care orders and information:              Residence: HOME              Continue home health care [address]               Your wound-care supplies will be provided by: [address]              DME provider: [facility] with              MIRIAM loading:  Date               EJNOF Medications:        Wound cleansing:                           OG not scrub or use excessive force.                          Wash hands with soap and water before and after dressing changes.                         Prior to applying a clean dressing, cleanse wound with normal saline,                                wound cleanser, or mild soap and water.                           Ask the physician or nurse before getting the wound(s) wet in a shower        Daily Wound management:                          Keep weight off wounds and reposition every 2 hours.                          MYGVQ standing for long periods of time.                          GUEKN wraps/stockings in AM and remove at bedtime.                          If swelling is present, elevate legs to the level of the heart or above for 30  minutes 4-5 times a day and/or when sitting.                                                  When taking antibiotics take entire prescription as ordered by physician do not stop taking until medicine is all gone.                             Orders for this week: 8/30/2018    Puraply #1 applied, covered with mepitel and steri strips. Covered with fluffed 4x4, ABD, conform and secured with tape. Leave dressing in place for 1 week until seen at [address] [facility],[address] on 8/6/2018.  May change 4x4, ABD and conform only if becomes soiled with excessive drainage.           Follow up with Rupa Robertson NP  In 1 week in the wound care center     Call [phone] for any questions or concerns.     Date__________   Time____________      Physician Signature:___________________________                        Date:_______________                       Treatment Note Wound 05/03/18 Arterial ulcer # 5 LEFT MEDIAL PROXIMAL LOWER LEG (ONSET A WEEK AGO) (merged with #6 left distal medial leg 7/19/18) -Dressing/Treatment:  (fluffed 4x4 abd conform tape tubigrip e )    Written Patient Dismissal Instructions Given            Electronically signed by LES Lynn CNP on 8/30/2018 at 2:50 PM

## 2018-09-06 ENCOUNTER — HOSPITAL ENCOUNTER (OUTPATIENT)
Dept: WOUND CARE | Age: 83
Discharge: HOME | End: 2018-09-06
Attending: NURSE PRACTITIONER | Admitting: NURSE PRACTITIONER

## 2018-09-06 VITALS
DIASTOLIC BLOOD PRESSURE: 60 MMHG | RESPIRATION RATE: 18 BRPM | SYSTOLIC BLOOD PRESSURE: 153 MMHG | TEMPERATURE: 97.5 F | HEART RATE: 91 BPM

## 2018-09-06 DIAGNOSIS — L97.922 CHRONIC ULCER OF LEFT LOWER EXTREMITY WITH FAT LAYER EXPOSED (HCC): Primary | ICD-10-CM

## 2018-09-06 PROCEDURE — 15271 SKIN SUB GRAFT TRNK/ARM/LEG: CPT | Performed by: NURSE PRACTITIONER

## 2018-09-06 ASSESSMENT — PAIN DESCRIPTION - DESCRIPTORS: DESCRIPTORS: ACHING

## 2018-09-06 ASSESSMENT — PAIN DESCRIPTION - PROGRESSION: CLINICAL_PROGRESSION: NOT CHANGED

## 2018-09-06 ASSESSMENT — PAIN DESCRIPTION - ONSET: ONSET: ON-GOING

## 2018-09-06 ASSESSMENT — PAIN DESCRIPTION - PAIN TYPE: TYPE: ACUTE PAIN

## 2018-09-06 ASSESSMENT — PAIN DESCRIPTION - FREQUENCY: FREQUENCY: CONTINUOUS

## 2018-09-06 ASSESSMENT — PAIN SCALES - GENERAL: PAINLEVEL_OUTOF10: 3

## 2018-09-06 ASSESSMENT — PAIN DESCRIPTION - LOCATION: LOCATION: LEG

## 2018-09-06 ASSESSMENT — PAIN DESCRIPTION - ORIENTATION: ORIENTATION: LEFT

## 2018-09-06 NOTE — PROGRESS NOTES
Wound Care Center Progress Note and Bioengineered Skin Application      Hernandez Blunt  AGE: 80 y.o. GENDER: female  : 1922  EPISODE DATE:  2018     Subjective:     Chief Complaint   Patient presents with    Wound Check     left leg          HISTORY of PRESENT ILLNESS      Hernandez Blunt is a 80 y.o. female who presents today for wound evaluation of Chronic venous and arterial wound(s) of left lower leg. The wound is of moderate severity. The underlying cause of the wound is venous and arterial. The wound looks smaller today with healthier beefy red tissue. Wound extends to fat layer. If appropriate skin graft will be applied.   Wound Pain Timing/Severity: none  Quality of pain: N/A  Severity of pain:  0 / 10   Modifying Factors: edema, venous stasis and arterial insufficiency  Associated Signs/Symptoms: none        PAST MEDICAL HISTORY        Diagnosis Date    AAA (abdominal aortic aneurysm) (HCC)     Basosquamous carcinoma     L leg - Dr Jarrett Tsang COPD (chronic obstructive pulmonary disease) ([address]     CVA (cerebral vascular accident) ([address]     L parietal w speech deficit  ~2000, sx resolved    Gastro-esophageal reflux disease with esophagitis     Gout     hand swelling and pain    Hyperlipidemia     Non-healing surgical wound     Osteoarthritis     Osteoarthritis, generalized     on chr pred started by Rheum    Peripheral vascular disease ([address] 2008, 2008    S/P PTA and stents X 2 to Right leg- Dr Babar Walker Thoracic aortic aneurysm ([address]     Venous stasis ulcer ([address] 2012       PAST SURGICAL HISTORY    Past Surgical History:   Procedure Laterality Date    APPENDECTOMY      ARTERY SURGERY      STENT TO LEFT FEMORIAL AND ALSO TO RIGHT SUPRA FEMORIAL ARTERY    FRACTURE SURGERY      Left ???   -ORIF    HYSTERECTOMY, TOTAL ABDOMINAL  1949    age 27-for fibroids    LEG SURGERY      2008-Right leg- PTA and Stent for PVD - Dr Felicitas Mandujano:   2008- Right leg -PTA and Stent- Dr Cari Glover  9/7/12    L basosquamous cell CA L leg    VASCULAR SURGERY         FAMILY HISTORY    Family History   Problem Relation Age of Onset    Cancer Mother     Early Death Mother     Hearing Loss Mother     Cancer Sister        SOCIAL HISTORY    Social History   Substance Use Topics    Smoking status: Former Smoker     Packs/day: 0.00     Quit date: 1/20/2000    Smokeless tobacco: Former User      Comment: Prior Hx of 1 Pack per week, none currently    Alcohol use Yes      Comment: pt states \"one drink a day\"       ALLERGIES    Allergies   Allergen Reactions    Lisinopril      Cough      Sulfa Antibiotics     Pcn [Penicillins] Rash     Does tolerate keflex       MEDICATIONS    Current Outpatient Prescriptions on File Prior to Encounter   Medication Sig Dispense Refill    lovastatin (MEVACOR) 40 MG tablet Take 1 tablet by mouth 2 times daily TAKE ONE (1) TABLET BY MOUTH TWO TIMES DAILY jscript:void(0) 180 tablet 1    cyclobenzaprine (FLEXERIL) 5 MG tablet Take 1 tablet by mouth every evening 90 tablet 1    hydrALAZINE (APRESOLINE) 25 MG tablet Take 1 tablet by mouth every 12 hours 180 tablet 1    losartan (COZAAR) 25 MG tablet Take 1 tablet by mouth daily 90 tablet 1    ranitidine (ZANTAC) 150 MG capsule Take 1 capsule by mouth 2 times daily 180 capsule 1    hydrochlorothiazide (HYDRODIURIL) 12.5 MG tablet Take 1 tablet by mouth three times a week 36 tablet 2    predniSONE (DELTASONE) 5 MG tablet Take 1 tablet by mouth daily 90 tablet 1    meclizine (TRAVEL SICKNESS) 25 MG CHEW Take 1 tablet by mouth every 6 hours as needed (dizziness/vertigo) 60 tablet 1    warfarin (COUMADIN) 2 MG tablet Take 1 tablet by mouth daily (Patient taking differently: Take 2 mg by mouth daily Saturday AND Sunday GETS ONE-HALF) 30 tablet 0    HYDROcodone-acetaminophen (NORCO) 5-325 MG per tablet Take 1 tablet by mouth every 8 hours as needed for Pain. [facility] acetaminophen (TYLENOL) 325 MG                        Consults: DR David Briones care physician: DR Humera Garner     Continuing wound care orders and information:              Residence: HOME              Continue home health care [address]               Your wound-care supplies will be provided by: [address]              DME provider: [address] with              MJM loading:  Date               SIHXY Medications:        Wound cleansing:                           HL not scrub or use excessive force.                          Wash hands with soap and water before and after dressing changes.                         Prior to applying a clean dressing, cleanse wound with normal saline,                                wound cleanser, or mild soap and water.                           Ask the physician or nurse before getting the wound(s) wet in a shower        Daily Wound management:                          Keep weight off wounds and reposition every 2 hours.                          HGTAU standing for long periods of time.                          ZCGRJ wraps/stockings in AM and remove at bedtime.                          If swelling is present, elevate legs to the level of the heart or above for 30  minutes 4-5 times a day and/or when sitting.                                                  When taking antibiotics take entire prescription as ordered by physician do not stop taking until medicine is all gone.                             Orders for this week:  9/6/2018     Puraply # 2 applied, covered with mepitel and steri strips by CNP. Cover with fluffed 4x4, ABD, conform and secure with tape. At home, Leave dressing in place for 1 week until seen at [address] [facility],[address] on 8/6/2018.  Home care may change 4x4, ABD and conform only if becomes soiled with excessive drainage.            Follow up with Vaishali Quick NP  In 1 week in the wound care center     Call [phone] for any questions or

## 2018-09-13 ENCOUNTER — HOSPITAL ENCOUNTER (OUTPATIENT)
Dept: WOUND CARE | Age: 83
Discharge: HOME | End: 2018-09-13
Attending: NURSE PRACTITIONER | Admitting: NURSE PRACTITIONER

## 2018-09-13 VITALS
SYSTOLIC BLOOD PRESSURE: 146 MMHG | TEMPERATURE: 97.1 F | HEART RATE: 82 BPM | RESPIRATION RATE: 18 BRPM | DIASTOLIC BLOOD PRESSURE: 67 MMHG

## 2018-09-13 DIAGNOSIS — L97.922 CHRONIC ULCER OF LEFT LOWER EXTREMITY WITH FAT LAYER EXPOSED (HCC): Primary | ICD-10-CM

## 2018-09-13 PROCEDURE — 15271 SKIN SUB GRAFT TRNK/ARM/LEG: CPT | Performed by: NURSE PRACTITIONER

## 2018-09-13 RX ORDER — LIDOCAINE HYDROCHLORIDE 40 MG/ML
SOLUTION TOPICAL ONCE
Status: DISCONTINUED | OUTPATIENT
Start: 2018-09-13 | End: 2018-09-14 | Stop reason: HOSPADM

## 2018-09-13 ASSESSMENT — PAIN SCALES - GENERAL: PAINLEVEL_OUTOF10: 5

## 2018-09-13 ASSESSMENT — PAIN DESCRIPTION - PAIN TYPE: TYPE: CHRONIC PAIN

## 2018-09-13 ASSESSMENT — PAIN DESCRIPTION - PROGRESSION: CLINICAL_PROGRESSION: NOT CHANGED

## 2018-09-13 ASSESSMENT — PAIN DESCRIPTION - DESCRIPTORS: DESCRIPTORS: ACHING

## 2018-09-13 ASSESSMENT — PAIN DESCRIPTION - LOCATION: LOCATION: LEG

## 2018-09-13 ASSESSMENT — PAIN DESCRIPTION - FREQUENCY: FREQUENCY: INTERMITTENT

## 2018-09-13 ASSESSMENT — PAIN DESCRIPTION - ONSET: ONSET: ON-GOING

## 2018-09-13 ASSESSMENT — PAIN DESCRIPTION - ORIENTATION: ORIENTATION: LEFT

## 2018-09-13 NOTE — PLAN OF CARE
Problem: Pain:  Intervention: Opioid analgesia side-effects  See flowsheet  Intervention: Assess barriers to pain control  See flowsheet  Intervention: Promote participation in pain management plan  See flowsheet    Goal: Pain level will decrease  Pain level will decrease   Outcome: Ongoing    Goal: Control of acute pain  Control of acute pain   Outcome: Ongoing    Goal: Control of chronic pain  Control of chronic pain   Outcome: Ongoing      Problem: Wound:  Intervention: Assess pain status  See flowsheet  Intervention: Assess wound size, appearance and drainage  See flowsheet  Intervention: Assess pedal pulses bilaterally if patient has a foot or leg ulcer  See flowsheet  Intervention: Doppler if unable to palpate pedal pulse  See flowsheet    Goal: Will show signs of wound healing; wound closure and no evidence of infection  Will show signs of wound healing; wound closure and no evidence of infection   Outcome: Ongoing

## 2018-09-13 NOTE — PROGRESS NOTES
tablet 1    HYDROcodone-acetaminophen (NORCO) 5-325 MG per tablet Take 1 tablet by mouth every 8 hours as needed for Pain. Luis Bolk acetaminophen (TYLENOL) 325 MG tablet Take 2 tablets by mouth every 4 hours as needed for Pain 120 tablet 3     No current facility-administered medications on file prior to encounter. REVIEW OF SYSTEMS    Pertinent items are noted in HPI. Constitutional: Negative for systemic symptoms including fever, chills and malaise. Objective:      BP (!) 146/67   Pulse 82   Temp 97.1 °F (36.2 °C) (Temporal)   Resp 18   LMP  (LMP Unknown)     PHYSICAL EXAM    General: The patient is in no acute distress. Mental status:  Patient is appropriate, is  oriented to place and plan of care. 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 SKIN SUBSTITUTES/GRAFT APPLICATIONS PROCEDURE NOTE    Indicaton:     Chronic ulcer(s) of the left lower leg  that has(have) failed to heal with the usual wound protocol used for greater than 30 days. See Epic chart for previous modalities and details of previous treatment. The patient has no contraindications or evidence of infection. The patient's competency and support system is appropriate for proper care and follow up for the use of this graft, therefore a graft was placed as below. Procedure: The wound bed was cleansed and prepared as necessary to accept the graft. Debridement was required.     Consent obtained: Yes    Time out taken: Yes    Using #15 blade scalpel sharp Excisional Debridement of the wound(s) Puraply #3 Lot # [id number]. 1.1B              HBO:                Antibiotics:                WRUFBSN Wound care treatments:  Santyl ordered              Authorizations:                        Consults: DR Zulma Juan                           Primary care physician: DR Humera Garner     Continuing wound care orders and information:              Residence: HOME              Continue home health care [address]               Your wound-care supplies will be provided by: [facility] provider: [address] [place] [address] with              IKD loading:  Date               VQDPW Medications:        Wound cleansing:                           TJ not scrub or use excessive force.                          Wash hands with soap and water before and after dressing changes.                         Prior to applying a clean dressing, cleanse wound with normal saline,                                wound cleanser, or mild soap and water.                           Ask the physician or nurse before getting the wound(s) wet in a shower        Daily Wound management:                          Keep weight off wounds and reposition every 2 hours.                          Avoid standing for long periods of time.                          DAFJC wraps/stockings in AM and remove at bedtime.                          If swelling is present, elevate legs to the level of the heart or above for 30  minutes 4-5 times a day and/or when sitting.                                                  When taking antibiotics take entire prescription as ordered by physician do not stop taking until medicine is all gone.                             Orders for this week:  9/13/2018     Puraply # 3 applied, covered with mepitel and steri strips by CNP. Cover with fluffed 4x4, ABD, conform and secure with tape. At home, Leave dressing in place for 1 week until seen at [address] [facility] on 8/6/2018.  Home care may change 4x4, ABD and conform only if becomes

## 2018-09-14 ENCOUNTER — ANTI-COAG VISIT (OUTPATIENT)
Dept: INTERNAL MEDICINE CLINIC | Age: 83
End: 2018-09-14

## 2018-09-14 ENCOUNTER — OFFICE VISIT (OUTPATIENT)
Dept: INTERNAL MEDICINE CLINIC | Age: 83
End: 2018-09-14

## 2018-09-14 VITALS
DIASTOLIC BLOOD PRESSURE: 68 MMHG | WEIGHT: 127 LBS | SYSTOLIC BLOOD PRESSURE: 124 MMHG | HEART RATE: 90 BPM | OXYGEN SATURATION: 93 % | RESPIRATION RATE: 16 BRPM | BODY MASS INDEX: 20.5 KG/M2

## 2018-09-14 DIAGNOSIS — L98.499 ARTERIAL INSUFFICIENCY WITH ISCHEMIC ULCER (HCC): Primary | ICD-10-CM

## 2018-09-14 DIAGNOSIS — I63.50 CEREBROVASCULAR ACCIDENT (CVA) DUE TO STENOSIS OF CEREBRAL ARTERY (HCC): ICD-10-CM

## 2018-09-14 DIAGNOSIS — Z23 NEED FOR IMMUNIZATION AGAINST INFLUENZA: ICD-10-CM

## 2018-09-14 DIAGNOSIS — D68.9 COAGULATION DEFECT (HCC): ICD-10-CM

## 2018-09-14 DIAGNOSIS — I77.1 ARTERIAL INSUFFICIENCY WITH ISCHEMIC ULCER (HCC): Primary | ICD-10-CM

## 2018-09-14 LAB
INTERNATIONAL NORMALIZATION RATIO, POC: 2.7
PROTHROMBIN TIME, POC: 32.8

## 2018-09-14 PROCEDURE — G8427 DOCREV CUR MEDS BY ELIG CLIN: HCPCS | Performed by: INTERNAL MEDICINE

## 2018-09-14 PROCEDURE — 85610 PROTHROMBIN TIME: CPT | Performed by: INTERNAL MEDICINE

## 2018-09-14 PROCEDURE — G8598 ASA/ANTIPLAT THER USED: HCPCS | Performed by: INTERNAL MEDICINE

## 2018-09-14 PROCEDURE — 1123F ACP DISCUSS/DSCN MKR DOCD: CPT | Performed by: INTERNAL MEDICINE

## 2018-09-14 PROCEDURE — G0008 ADMIN INFLUENZA VIRUS VAC: HCPCS | Performed by: INTERNAL MEDICINE

## 2018-09-14 PROCEDURE — 1036F TOBACCO NON-USER: CPT | Performed by: INTERNAL MEDICINE

## 2018-09-14 PROCEDURE — G8510 SCR DEP NEG, NO PLAN REQD: HCPCS | Performed by: INTERNAL MEDICINE

## 2018-09-14 PROCEDURE — 1090F PRES/ABSN URINE INCON ASSESS: CPT | Performed by: INTERNAL MEDICINE

## 2018-09-14 PROCEDURE — 99213 OFFICE O/P EST LOW 20 MIN: CPT | Performed by: INTERNAL MEDICINE

## 2018-09-14 PROCEDURE — G8420 CALC BMI NORM PARAMETERS: HCPCS | Performed by: INTERNAL MEDICINE

## 2018-09-14 PROCEDURE — 4040F PNEUMOC VAC/ADMIN/RCVD: CPT | Performed by: INTERNAL MEDICINE

## 2018-09-14 PROCEDURE — 1101F PT FALLS ASSESS-DOCD LE1/YR: CPT | Performed by: INTERNAL MEDICINE

## 2018-09-14 PROCEDURE — 90662 IIV NO PRSV INCREASED AG IM: CPT | Performed by: INTERNAL MEDICINE

## 2018-09-14 ASSESSMENT — PATIENT HEALTH QUESTIONNAIRE - PHQ9
1. LITTLE INTEREST OR PLEASURE IN DOING THINGS: 0
SUM OF ALL RESPONSES TO PHQ QUESTIONS 1-9: 0
SUM OF ALL RESPONSES TO PHQ9 QUESTIONS 1 & 2: 0
2. FEELING DOWN, DEPRESSED OR HOPELESS: 0
SUM OF ALL RESPONSES TO PHQ QUESTIONS 1-9: 0

## 2018-09-14 NOTE — PROGRESS NOTES
PuraPly AMTreatment Note    NAME:  Freedom Skinner  YOB: 1922  MEDICAL RECORD NUMBER:  [id number]  DATE:  9/13/2018    Goal:  Patient will receive safe and proper application of skin substitute. Patient will comply with caring for dressing, and reporting complications. [x]Expiration date checked immediately prior to use. [x] Package intact prior to use and no damage noted. [x] Transport temperature controlled and acceptable. PuraPly was removed from protective sterile packaging by physician and applied to prepared ulcer bed of left lower leg wound. PuraPly was hydrated with sterile normal saline per physician. PuraPly was applied to left lower leg wound and affixed with steri-strips by the physician. [x] PuraPly was covered with non-adherent mepitel followed by steri strips. [x] Applied fluffed 4x4's and ABD over non-adherent mepitel. [x] Applied conform and secured with tape  Patient/caregiver was instructed not to remove dressing until next clinic visit in 1 week and to keep it clean    and dry. Pt/family/caregiver was instructed on signs and symptoms of complications       to report such as draining through dressing, dressing falling down/slipping,           getting wet, or severe pain or tingling. PuraPly AM may be applied a total of 10 times per wound over a 12 week period. Date of first application of PuraPly for this current wound is August 16th, 2018 .     Guidelines followed    Electronically signed by Marciano Mari RN on 9/13/2018

## 2018-09-14 NOTE — PROGRESS NOTES
Tiffani Palacios  5/30/1922 09/14/18    SUBJECTIVE:  L wound is healing and is getting dressing changes and debridement weekly. pic of wound fr clinic was reviewed. CVA, no recurring dizziness on coumadin and INR 2.7 today. Current dose is 2mg daily x 1mg every Sat/Sun    OBJECTIVE:    /68   Pulse 90   Resp 16   Wt 127 lb (57.6 kg)   LMP  (LMP Unknown)   SpO2 93%   BMI 20.50 kg/m²     Physical Exam   Constitutional: She appears well-developed and well-nourished. Neck: Neck supple. Cardiovascular: Normal rate, regular rhythm and normal heart sounds. Exam reveals no gallop and no friction rub. No murmur heard. Pulmonary/Chest: Effort normal and breath sounds normal. No respiratory distress. She has no wheezes. She has no rales. Abdominal: Soft. Bowel sounds are normal. She exhibits no distension. There is no tenderness. Musculoskeletal: She exhibits no edema. Neurological: She is alert. Skin:   L leg bandaging in place. Psychiatric: She has a normal mood and affect. Vitals reviewed. ASSESSMENT:    1. Arterial insufficiency with ischemic ulcer ([address]    2. Cerebrovascular accident (CVA) due to stenosis of cerebral artery ([address]    3. Coagulation defect ([address]    4. Need for immunization against influenza        PLAN:    Rajni Glynn was seen today for office visit for anticoagulation management. Diagnoses and all orders for this visit:    Arterial insufficiency with ischemic ulcer ([address]- IMPROVED AFTER VASC INTERVENTION, CONT F/U WOUND CLINIC AS WELL. Cerebrovascular accident (CVA) due to stenosis of cerebral artery (HCC) - Remains stable neurologically w/o evidence of acute changes/focal deficits. Cont regimen.       Coagulation defect ([address]  -     POCT INR    Need for immunization against influenza  -     INFLUENZA, HIGH DOSE, 65 YRS +, IM, PF, PREFILL SYR, 0.5ML (FLUZONE HD)

## 2018-09-18 PROBLEM — I63.30 CEREBROVASCULAR ACCIDENT (CVA) DUE TO THROMBOSIS OF CEREBRAL ARTERY (HCC): Status: ACTIVE | Noted: 2018-09-18

## 2018-09-18 PROBLEM — I63.9 ACUTE CEREBROVASCULAR ACCIDENT (CVA) (HCC): Status: ACTIVE | Noted: 2018-09-18

## 2018-09-19 PROBLEM — L97.922: Status: ACTIVE | Noted: 2018-04-16

## 2018-09-19 PROBLEM — I63.30: Status: ACTIVE | Noted: 2018-09-19

## 2018-09-21 ENCOUNTER — TELEPHONE (OUTPATIENT)
Dept: INTERNAL MEDICINE CLINIC | Age: 83
End: 2018-09-21

## 2018-09-22 ENCOUNTER — CARE COORDINATION (OUTPATIENT)
Dept: CASE MANAGEMENT | Age: 83
End: 2018-09-22

## 2018-09-22 DIAGNOSIS — I63.30 CEREBROVASCULAR ACCIDENT (CVA) DUE TO THROMBOSIS OF CEREBRAL ARTERY (HCC): Primary | ICD-10-CM

## 2018-09-22 NOTE — CARE COORDINATION
Kev 45 Transitions Initial Follow Up Call    Call within 2 business days of discharge: Yes    Patient: Byron Laurent Patient : 1922   MRN: [id number]  Reason for Admission: There are no discharge diagnoses documented for the most recent discharge. Discharge Date: 18 RARS: Readmission Risk Score: 21     Spoke with: Byron Laurent and daughter-in-law      Facility: [address] Transitions 24 Hour Call    Patient DME:  Ann desai  Do you have support at home?:  Child  Are you an active caregiver in your home?:  No  Care Transitions Interventions         Follow Up: CTC reached out to patient for first 24 hour discharge call, she could not hear CTC. CTC disconnected and made another attempt and patient reports that she still cannot hear CTC. [facility] attempted outreach to son Kike Marshall, spoke with his wife who reported that he was on his way to her house. [facility] will attempt outreach at a later time. [facility] attempted outreach to patient again, trying to reach son \"Miguel Angel\". Patient could not hear me and Stephen Guevara was not there. [facility] contacted Miguel Angel's wife and gave her CTC phone number and requested that Rebeccama Paola call at his convenience to review discharge instructions and medications. Wife verbalizes that she will give him the information. [facility] will continue with outreach attempts to [facility] @ [phone]. Call attempt to [facility] as indicated in CM note. This home health care provider does not service the [facility]. [facility] contacted patient's daughter-in-law and she reports that the home health provider is [facility]. [facility] contacted \"Ally\" @ [facility] and she confirmed that orders to resume care had been received and the nurse \"Kyleigh\" will see patient today. [facility] will attempt outreach to son again tomorrow.     Future Appointments  Date Time Provider Hilario Leyva   2018 11:30 AM Lea Davies MD [facility] ELE PR[facility]

## 2018-09-22 NOTE — CARE COORDINATION
Kev 45 Transitions Initial Follow Up Call    Call within 2 business days of discharge: Yes    Patient: Freedom Skinner Patient : 1922   MRN: [id number]  Reason for Admission: There are no discharge diagnoses documented for the most recent discharge. Discharge Date: 18 RARS: Readmission Risk Score: 21     Spoke with: Jozef 7: [facility] provided:  Obtained and reviewed discharge summary and/or continuity of care documents    Care Transitions 24 Hour Call    Do you have any ongoing symptoms?:  No  Do you have a copy of your discharge instructions?:  Yes  Do you have all of your prescriptions and are they filled?:  Yes  Have you been contacted by a [employer] [facility]?:  No  Have you scheduled your follow up appointment?:  Yes  How are you going to get to your appointment?:  Car - family or friend to transport  Were you discharged with any Home Care or Post Acute Services:  Yes  Post Acute Services:  Home Health  Patient DME:  Jeannine desai  Do you have support at home?:  Child  Do you feel like you have everything you need to keep you well at home?:  Yes  Are you an active caregiver in your home?:  No  Care Transitions Interventions         Follow Up: Discussed discharge instructions with son \"Miguel Angel\", he reports that patient is doing \"O. K. \". She has someone staying with her and a home health care nurse coming today. She continues to go to wound clinic once/wee, will go this coming Thursday. Reviewed medications, 1111F completed. Patient has PCP follow up 18 and son has question about hydralazine dose change from 10 mg TID to 25 mg TID. Provided contact information and will continue with outreach follow up calls.     Future Appointments  Date Time Provider Hilario Leyva   2018 11:30 AM Leno Fatima MD [facility]   10/10/2018 9:30 AM Leno Fatima MD [facility] E [facility]       Britni Leo RN

## 2018-09-24 ENCOUNTER — OFFICE VISIT (OUTPATIENT)
Dept: INTERNAL MEDICINE CLINIC | Age: 83
End: 2018-09-24
Payer: MEDICARE

## 2018-09-24 VITALS
HEART RATE: 64 BPM | RESPIRATION RATE: 16 BRPM | DIASTOLIC BLOOD PRESSURE: 64 MMHG | OXYGEN SATURATION: 96 % | SYSTOLIC BLOOD PRESSURE: 130 MMHG

## 2018-09-24 DIAGNOSIS — I63.9 ACUTE CEREBROVASCULAR ACCIDENT (CVA) (HCC): Primary | ICD-10-CM

## 2018-09-24 DIAGNOSIS — I10 ESSENTIAL HYPERTENSION: ICD-10-CM

## 2018-09-24 DIAGNOSIS — J42 CHRONIC BRONCHITIS, UNSPECIFIED CHRONIC BRONCHITIS TYPE (HCC): ICD-10-CM

## 2018-09-24 DIAGNOSIS — L97.922 VASCULITIC ULCER OF LEFT LOWER EXTREMITY WITH FAT LAYER EXPOSED (HCC): ICD-10-CM

## 2018-09-24 LAB
INTERNATIONAL NORMALIZATION RATIO, POC: 1.8
PROTHROMBIN TIME, POC: 22

## 2018-09-24 PROCEDURE — 1111F DSCHRG MED/CURRENT MED MERGE: CPT | Performed by: INTERNAL MEDICINE

## 2018-09-24 PROCEDURE — 99496 TRANSJ CARE MGMT HIGH F2F 7D: CPT | Performed by: INTERNAL MEDICINE

## 2018-09-24 PROCEDURE — 85610 PROTHROMBIN TIME: CPT | Performed by: INTERNAL MEDICINE

## 2018-09-24 NOTE — PROGRESS NOTES
Post-Discharge Transitional Care Management Services or Hospital Follow Up      Aren Pena   YOB: 1922    Date of Office Visit:  9/24/2018  Date of Hospital Admission: 9/19/18  Date of Hospital Discharge: 9/21/18  Readmission Risk Score(high >=14%.  Medium >=10%):Readmission Risk Score: 21    Care management risk score Rising risk (score 2-5) and Complex Care (Scores >=6): 9     Non face to face  following discharge, date last encounter closed (first attempt may have been earlier): 9/22/2018  2:50 PM 9/22/2018  2:50 PM    Call initiated 2 business days of discharge: Yes     Patient Active Problem List   Diagnosis    Hypertension    Hyperlipidemia    CVA (cerebral vascular accident) ([address]    Gastro-esophageal reflux disease with esophagitis    Basosquamous carcinoma    Osteoarthritis, generalized    Osteoarthritis of hand, left    COPD (chronic obstructive pulmonary disease) ([address]    Gout    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 ([address]    Peripheral vascular disease ([address]    Atherosclerosis of native artery of both lower extremities with bilateral ulceration of calves ([address]    Arterial insufficiency with ischemic ulcer ([address]    Chronic renal impairment, stage 2 (mild)    Vasculitic ulcer of left lower extremity with fat layer exposed ([address]    AAA (abdominal aortic aneurysm) ([address]    Thoracic aortic aneurysm ([address]    Debility    Uncontrolled pain    Gait disturbance    Claudication in peripheral vascular disease ([address]    WD-Chronic ulcer of left lower extremity with fat layer exposed ([address]    CAD in native artery    Acute blood loss anemia    Physical deconditioning    Pseudoaneurysm ([address]    Cerebrovascular accident (CVA) due to thrombosis of cerebral artery ([address]    Acute cerebrovascular accident (CVA) ([address]    Iron deficiency anemia    Stroke due to thrombosis of cerebral artery (HCC)       Allergies   Allergen Reactions    Lisinopril      Cough      Sulfa Antibiotics     Pcn [Penicillins] Rash     Does tolerate keflex       Medications listed as ordered at the time of discharge from hospital   [facility] [address] [facility] Medication Instructions SARINA:    Printed on:09/24/18 8078   Medication Information                      acetaminophen (TYLENOL) 325 MG tablet  Take 2 tablets by mouth every 4 hours as needed for Pain             clopidogrel (PLAVIX) 75 MG tablet  Take 1 tablet by mouth daily             cyclobenzaprine (FLEXERIL) 5 MG tablet  Take 1 tablet by mouth every evening             hydrALAZINE (APRESOLINE) 10 MG tablet  Take 2.5 tablets by mouth 3 times daily             hydrochlorothiazide (HYDRODIURIL) 12.5 MG tablet  Take 1 tablet by mouth three times a week             HYDROcodone-acetaminophen (NORCO) 5-325 MG per tablet  Take 1 tablet by mouth every 8 hours as needed for Pain. Badger Deirdre              losartan (COZAAR) 25 MG tablet  Take 1 tablet by mouth daily             lovastatin (MEVACOR) 40 MG tablet  Take 1 tablet by mouth 2 times daily TAKE ONE (1) TABLET BY MOUTH TWO TIMES DAILY jscript:void(0)             meclizine (TRAVEL SICKNESS) 25 MG CHEW  Take 1 tablet by mouth every 6 hours as needed (dizziness/vertigo)             predniSONE (DELTASONE) 5 MG tablet  Take 1 tablet by mouth daily             ranitidine (ZANTAC) 150 MG capsule  Take 1 capsule by mouth 2 times daily             warfarin (COUMADIN) 2 MG tablet  Take 1 tablet by mouth daily                   Medications marked \"taking\" at this time  Outpatient Prescriptions Marked as Taking for the 9/24/18 encounter (Office Visit) with Jon Jones MD   Medication Sig Dispense Refill    hydrALAZINE (APRESOLINE) 10 MG tablet Take 2.5 tablets by mouth 3 times daily 90 tablet 3    clopidogrel (PLAVIX) 75 MG tablet Take 1 tablet by mouth daily 30 tablet 3    lovastatin (MEVACOR) 40 MG tablet Take 1 tablet by mouth 2 times daily TAKE ONE (1) TABLET BY MOUTH TWO TIMES DAILY jscript:void(0) 180 tablet 1    cyclobenzaprine (FLEXERIL) 5 MG tablet Take 1 tablet by mouth every evening 90 tablet 1    losartan (COZAAR) 25 MG tablet Take 1 tablet by mouth daily 90 tablet 1    ranitidine (ZANTAC) 150 MG capsule Take 1 capsule by mouth 2 times daily 180 capsule 1    hydrochlorothiazide (HYDRODIURIL) 12.5 MG tablet Take 1 tablet by mouth three times a week (Patient taking differently: Take 12.5 mg by mouth three times a week 09/18/18 Patient takes on Mon/Wed/Fri) 36 tablet 2    predniSONE (DELTASONE) 5 MG tablet Take 1 tablet by mouth daily 90 tablet 1    meclizine (TRAVEL SICKNESS) 25 MG CHEW Take 1 tablet by mouth every 6 hours as needed (dizziness/vertigo) 60 tablet 1    warfarin (COUMADIN) 2 MG tablet Take 1 tablet by mouth daily (Patient taking differently: Take 2 mg by mouth daily 09/18/18 1 mg tablet on Saturday AND Sunday, takes at HS) 30 tablet 0    HYDROcodone-acetaminophen (NORCO) 5-325 MG per tablet Take 1 tablet by mouth every 8 hours as needed for Pain. Moise Catena acetaminophen (TYLENOL) 325 MG tablet Take 2 tablets by mouth every 4 hours as needed for Pain 120 tablet 3        Medications patient taking as of now reconciled against medications ordered at time of hospital discharge: Yes    Chief Complaint   Patient presents with   [address] from Hospital     discuss dose of hydralazine       HPI    Inpatient course: Discharge summary reviewed- see chart. 79yo WF w hx of CVA and HTN presented to ED w sudden onset of weakness and repetitive speech. Transferred to ED, had high clinical suspicion of stroke but initial lab and testing incl head CT neg. Admitted for eval, f/u MRI brain was pos for L parietal ac stroke. Had PT eval and pt had complete resolution of deficits, walking also w;/o difficulty and was cleared for discharge to home w [facility]. Her asa was switched to plavix, BP also sl high so hydralazine 10mg TID was incr to 25mg TID as only other med change.   Neuro

## 2018-09-25 ENCOUNTER — CARE COORDINATION (OUTPATIENT)
Dept: CASE MANAGEMENT | Age: 83
End: 2018-09-25

## 2018-09-27 ENCOUNTER — HOSPITAL ENCOUNTER (OUTPATIENT)
Dept: WOUND CARE | Age: 83
Discharge: HOME | End: 2018-09-27
Payer: MEDICARE

## 2018-09-27 VITALS
SYSTOLIC BLOOD PRESSURE: 173 MMHG | RESPIRATION RATE: 18 BRPM | DIASTOLIC BLOOD PRESSURE: 71 MMHG | HEART RATE: 76 BPM | TEMPERATURE: 97.9 F

## 2018-09-27 DIAGNOSIS — L97.922 CHRONIC ULCER OF LEFT LOWER EXTREMITY WITH FAT LAYER EXPOSED (HCC): Primary | ICD-10-CM

## 2018-09-27 PROCEDURE — 15271 SKIN SUB GRAFT TRNK/ARM/LEG: CPT

## 2018-09-27 PROCEDURE — 15271 SKIN SUB GRAFT TRNK/ARM/LEG: CPT | Performed by: NURSE PRACTITIONER

## 2018-09-27 RX ORDER — LIDOCAINE HYDROCHLORIDE 40 MG/ML
SOLUTION TOPICAL ONCE
Status: DISCONTINUED | OUTPATIENT
Start: 2018-09-27 | End: 2018-09-28 | Stop reason: HOSPADM

## 2018-09-27 ASSESSMENT — PAIN DESCRIPTION - LOCATION: LOCATION: LEG

## 2018-09-27 ASSESSMENT — PAIN DESCRIPTION - DESCRIPTORS: DESCRIPTORS: ACHING;THROBBING

## 2018-09-27 ASSESSMENT — PAIN DESCRIPTION - PROGRESSION: CLINICAL_PROGRESSION: NOT CHANGED

## 2018-09-27 ASSESSMENT — PAIN DESCRIPTION - PAIN TYPE: TYPE: CHRONIC PAIN

## 2018-09-27 ASSESSMENT — PAIN DESCRIPTION - ONSET: ONSET: ON-GOING

## 2018-09-27 ASSESSMENT — PAIN SCALES - GENERAL: PAINLEVEL_OUTOF10: 8

## 2018-09-27 ASSESSMENT — PAIN DESCRIPTION - FREQUENCY: FREQUENCY: INTERMITTENT

## 2018-09-27 NOTE — PROGRESS NOTES
9/7/12    L basosquamous cell CA L leg    VASCULAR SURGERY         FAMILY HISTORY    Family History   Problem Relation Age of Onset    Cancer Mother     Early Death Mother     Hearing Loss Mother     Cancer Sister        SOCIAL HISTORY    Social History   Substance Use Topics    Smoking status: Former Smoker     Packs/day: 0.00     Quit date: 1/20/2000    Smokeless tobacco: Former User      Comment: Prior Hx of 1 Pack per week, none currently    Alcohol use Yes      Comment: pt states \"one drink a day\"       ALLERGIES    Allergies   Allergen Reactions    Lisinopril      Cough      Sulfa Antibiotics     Pcn [Penicillins] Rash     Does tolerate keflex       MEDICATIONS    Current Outpatient Prescriptions on File Prior to Encounter   Medication Sig Dispense Refill    hydrALAZINE (APRESOLINE) 10 MG tablet Take 2.5 tablets by mouth 3 times daily 90 tablet 3    clopidogrel (PLAVIX) 75 MG tablet Take 1 tablet by mouth daily 30 tablet 3    lovastatin (MEVACOR) 40 MG tablet Take 1 tablet by mouth 2 times daily TAKE ONE (1) TABLET BY MOUTH TWO TIMES DAILY jscript:void(0) 180 tablet 1    cyclobenzaprine (FLEXERIL) 5 MG tablet Take 1 tablet by mouth every evening 90 tablet 1    losartan (COZAAR) 25 MG tablet Take 1 tablet by mouth daily 90 tablet 1    ranitidine (ZANTAC) 150 MG capsule Take 1 capsule by mouth 2 times daily 180 capsule 1    hydrochlorothiazide (HYDRODIURIL) 12.5 MG tablet Take 1 tablet by mouth three times a week (Patient taking differently: Take 12.5 mg by mouth three times a week 09/18/18 Patient takes on Mon/Wed/Fri) 36 tablet 2    predniSONE (DELTASONE) 5 MG tablet Take 1 tablet by mouth daily 90 tablet 1    meclizine (TRAVEL SICKNESS) 25 MG CHEW Take 1 tablet by mouth every 6 hours as needed (dizziness/vertigo) 60 tablet 1    warfarin (COUMADIN) 2 MG tablet Take 1 tablet by mouth daily (Patient taking differently: Take 2 mg by mouth daily 09/18/18 1 mg tablet on Saturday AND Sunday, wound(s) was/were performed down through and including the removal of subcutaneous tissue. Devitalized Tissue Debrided:  biofilm, slough and exudate      Bleeding:  Minimal    Hemostasis Achieved:  by pressure    Procedural Pain:  3  / 10     Post Procedural Pain:  1 / 10     Having prepared the wound bed, the skin graft was completed as below. Product Utilized:          [] APLIGRAF   []44 sq cm   []88 sq cm    []132 sq cm  []176 sq cm           [] DERMAGRAFT  [] 38 sq cm   []76 sq cm    []114 sq cm  []152 sq cm        [] THERASKIN  [] 13 sq cm   []37.34 sq cm             [] EpiFix   [] 1.54 sq cm disc    [] 2 pieces (3.08 sq cm)    [] 3  pieces (4.62 sq  cm)   [] 6 sq/cm    [] 16 sq cm     [] 18 sq cm   Fenestrated        [] OASIS   [] 10.5 sq cm   []21 sq cm    []35 sq cm Tri-Matrix  []70 sq cm          Tri-Matrix                    [x] PURAPLY   [] 4 sq cm   [x] 8 sq cm   [] 25sq cm                  [] Grafix      [] 1.54 sq cm disc    [] 3 sq cm    [] 6 sq cm   [] 12 sq cm   [] 25 sq cm        Skin Substitute Applied:     Performed by: LES Ramos CNP    Consent obtained: Yes    Time out taken: Yes     Fenestrated: Yes    Skin Substitute was Applied to Wound Number(s):     5      Wound 05/03/18 Arterial ulcer # 5 LEFT MEDIAL PROXIMAL LOWER LEG (ONSET A WEEK AGO) (merged with #6 left distal medial leg 7/19/18)  (Active)   Wound Image   9/27/2018  1:25 PM   Wound Type Wound 9/27/2018  1:25 PM   Wound Arterial 9/27/2018  1:25 PM   Dressing Status Clean;Dry; Intact 9/27/2018  2:15 PM   Dressing Changed Changed/New 9/27/2018  2:15 PM   Dressing/Treatment Dry dressing 9/18/2018 10:20 PM   Wound Cleansed Rinsed/Irrigated with saline 9/27/2018  1:25 PM   Wound Length (cm) 2.2 cm 9/27/2018  1:52 PM   Wound Width (cm) 1.7 cm 9/27/2018  1:52 PM   Wound Depth (cm)  0.1 9/27/2018  1:52 PM   Calculated Wound Size (cm^2) (l*w) 3.74 cm^2 9/27/2018  1:52 PM   Change in Wound Size % (l*w) -580 9/27/2018  1:52 PM   Distance Tunneling (cm) 0 cm 9/27/2018  1:25 PM   Tunneling Position ___ O'Clock 0 9/27/2018  1:25 PM   Undermining Starts ___ O'Clock 0 9/27/2018  1:25 PM   Undermining Ends___ O'Clock 0 9/27/2018  1:25 PM   Undermining Maxium Distance (cm) 0 9/27/2018  1:25 PM   Wound Assessment Red 9/27/2018  1:25 PM   Drainage Amount Moderate 9/27/2018  1:25 PM   Drainage Description Serosanguinous 9/27/2018  1:25 PM   Odor None 9/27/2018  1:25 PM   Margins Attached edges 9/27/2018  1:25 PM   Supriya-wound Assessment Clean 9/27/2018  1:25 PM   Non-staged Wound Description Full thickness 9/27/2018  1:25 PM   Sage%Wound Bed 0 9/27/2018  1:25 PM   Red%Wound Bed 100 9/27/2018  1:25 PM   Yellow%Wound Bed 0 9/27/2018  1:25 PM   Black%Wound Bed 0 9/27/2018  1:25 PM   Purple%Wound Bed 0 9/27/2018  1:25 PM   Other%Wound Bed 0 9/27/2018  1:25 PM   Culture Taken No 9/18/2018  6:30 PM   Debridement per physician Subcutaneous 6/14/2018  1:10 PM   Number of days: 147         Total Surface Area Covered 3.74 sq/cm    Was the Product Layered  Yes      Amount Wasted 4 sq/cm    Reason for Waste: material provided was greater than wound size      Secured: Yes    Instruments used to complete placement of graft::scissors and forceps    Secured With:   [] N/A  [x]Steri Strips    []Sutures     []Staples []Other    Procedural Pain: 0/10     Post Procedural Pain: 0 / 10    Response to Treatment:  Well tolerated by patient. Status of wound progress and description from last visit:   Improved, smaller in size. Plan:     Discharge instructions:  Discharge Instructions       PHYSICIAN ORDERS AND DISCHARGE INSTRUCTIONS     NOTE: Upon discharge from the [address], you will receive a patient experience survey.  We would be grateful if you would take the time to fill this survey out.     Wound care order history:                               Vascular studies: ARTERIAL STUDIES COMPLETE  Date 2/22/2018 AND 4/16/18- wraps/stockings in AM and remove at bedtime.                          If swelling is present, elevate legs to the level of the heart or above for 30  minutes 4-5 times a day and/or when sitting.                                                  When taking antibiotics take entire prescription as ordered by physician do not stop taking until medicine is all gone.                             Orders for this week:  9/27/2018     Puraply # 4  applied, covered with mepitel and steri strips by CNP. Cover with fluffed 4x4, ABD, conform and secure with tape. At home, Leave dressing in place for 1 week until seen at [address] [facility] on 10/4/2018.  Home care may change 4x4, ABD and conform ONLY IF TOP LAYER becomes soiled with excessive drainage.                  Follow up with [address] NP  In 1 week in the wound care center     Call [phone] for any questions or concerns.     Date__________   Time____________      Physician Signature:___________________________                        Date:_______________                       Treatment Note Wound 05/03/18 Arterial ulcer # 5 LEFT MEDIAL PROXIMAL LOWER LEG (ONSET A WEEK AGO) (merged with #6 left distal medial leg 7/19/18) -Dressing/Treatment:  (FLUFFED 4X4, ABD, CONFORM & TAPE )    Written Patient Dismissal Instructions Given            Electronically signed by LES Fontenot CNP on 9/27/2018 at 2:40 PM

## 2018-10-01 ENCOUNTER — TELEPHONE (OUTPATIENT)
Dept: INTERNAL MEDICINE CLINIC | Age: 83
End: 2018-10-01

## 2018-10-01 NOTE — TELEPHONE ENCOUNTER
[facility]. nurse called stating that emeli's PT/INR was 3.0/35.9  Pt is currently taking 2 mg coumadin. Please advise.

## 2018-10-02 ENCOUNTER — CARE COORDINATION (OUTPATIENT)
Dept: CASE MANAGEMENT | Age: 83
End: 2018-10-02

## 2018-10-02 DIAGNOSIS — K21.00 GASTRO-ESOPHAGEAL REFLUX DISEASE WITH ESOPHAGITIS: ICD-10-CM

## 2018-10-02 RX ORDER — RANITIDINE 150 MG/1
150 CAPSULE ORAL 2 TIMES DAILY
Qty: 180 CAPSULE | Refills: 1 | Status: SHIPPED | OUTPATIENT
Start: 2018-10-02 | End: 2019-02-08 | Stop reason: SDUPTHER

## 2018-10-02 NOTE — CARE COORDINATION
Kev 45 Transitions Follow Up Call    10/2/2018    Patient: Gem Valenzuela  Patient : 1922   MRN: [id number]  Reason for Admission: There are no discharge diagnoses documented for the most recent discharge. Discharge Date: 18 RARS: Readmission Risk Score: 21       Spoke with:   patient    Care Transitions Subsequent and Final Call    Subsequent and Final Calls  Care Transitions Interventions  Other Interventions: Follow Up:  Spoke with patient. Reports that she feels \"good\". Reports that her appetite is good and that she is sleeping well. Confirms that she is taking her medications as directed. Patient reports that [facility] RN is coming to change the dressing to her lower legs . Discussed the benefits of [facility] as a  resource for any change in condition or worsening symptoms. Patient reports that her care needs are being met at this time and denies any questions, equipment or resource needs. Agreeable to continued Care Transition.     Future Appointments  Date Time Provider Hilario Leyva   10/4/2018 1:00 PM SCHEDULE, [facility] WOUND 4 [facility] WC None   10/22/2018 10:00 AM Sherri Graves MD [facility] ELE [facility] RN

## 2018-10-04 ENCOUNTER — HOSPITAL ENCOUNTER (OUTPATIENT)
Dept: WOUND CARE | Age: 83
Discharge: HOME | End: 2018-10-04
Payer: MEDICARE

## 2018-10-04 ENCOUNTER — CARE COORDINATION (OUTPATIENT)
Dept: CASE MANAGEMENT | Age: 83
End: 2018-10-04

## 2018-10-04 VITALS
RESPIRATION RATE: 18 BRPM | TEMPERATURE: 97.7 F | DIASTOLIC BLOOD PRESSURE: 62 MMHG | SYSTOLIC BLOOD PRESSURE: 120 MMHG | HEART RATE: 74 BPM

## 2018-10-04 DIAGNOSIS — L97.922 CHRONIC ULCER OF LEFT LOWER EXTREMITY WITH FAT LAYER EXPOSED (HCC): Primary | ICD-10-CM

## 2018-10-04 DIAGNOSIS — I73.9 PERIPHERAL VASCULAR DISEASE (HCC): ICD-10-CM

## 2018-10-04 PROCEDURE — 15271 SKIN SUB GRAFT TRNK/ARM/LEG: CPT | Performed by: NURSE PRACTITIONER

## 2018-10-04 PROCEDURE — 15271 SKIN SUB GRAFT TRNK/ARM/LEG: CPT

## 2018-10-04 RX ORDER — LIDOCAINE HYDROCHLORIDE 40 MG/ML
SOLUTION TOPICAL ONCE
Status: DISCONTINUED | OUTPATIENT
Start: 2018-10-04 | End: 2018-10-05 | Stop reason: HOSPADM

## 2018-10-04 ASSESSMENT — PAIN DESCRIPTION - FREQUENCY: FREQUENCY: INTERMITTENT

## 2018-10-04 ASSESSMENT — PAIN DESCRIPTION - PROGRESSION: CLINICAL_PROGRESSION: NOT CHANGED

## 2018-10-04 ASSESSMENT — PAIN DESCRIPTION - LOCATION: LOCATION: LEG

## 2018-10-04 ASSESSMENT — PAIN DESCRIPTION - PAIN TYPE: TYPE: CHRONIC PAIN

## 2018-10-04 ASSESSMENT — PAIN DESCRIPTION - ORIENTATION: ORIENTATION: LEFT

## 2018-10-04 ASSESSMENT — PAIN DESCRIPTION - DESCRIPTORS: DESCRIPTORS: ACHING

## 2018-10-04 ASSESSMENT — PAIN SCALES - GENERAL: PAINLEVEL_OUTOF10: 3

## 2018-10-04 ASSESSMENT — PAIN DESCRIPTION - ONSET: ONSET: ON-GOING

## 2018-10-04 NOTE — PROGRESS NOTES
9/7/12    L basosquamous cell CA L leg    VASCULAR SURGERY         FAMILY HISTORY    Family History   Problem Relation Age of Onset    Cancer Mother     Early Death Mother     Hearing Loss Mother     Cancer Sister        SOCIAL HISTORY    Social History   Substance Use Topics    Smoking status: Former Smoker     Packs/day: 0.00     Quit date: 1/20/2000    Smokeless tobacco: Former User      Comment: Prior Hx of 1 Pack per week, none currently    Alcohol use Yes      Comment: pt states \"one drink a day\"       ALLERGIES    Allergies   Allergen Reactions    Lisinopril      Cough      Sulfa Antibiotics     Pcn [Penicillins] Rash     Does tolerate keflex       MEDICATIONS    Current Outpatient Prescriptions on File Prior to Encounter   Medication Sig Dispense Refill    ranitidine (ZANTAC) 150 MG capsule Take 1 capsule by mouth 2 times daily 180 capsule 1    hydrALAZINE (APRESOLINE) 10 MG tablet Take 2.5 tablets by mouth 3 times daily 90 tablet 3    clopidogrel (PLAVIX) 75 MG tablet Take 1 tablet by mouth daily 30 tablet 3    lovastatin (MEVACOR) 40 MG tablet Take 1 tablet by mouth 2 times daily TAKE ONE (1) TABLET BY MOUTH TWO TIMES DAILY jscript:void(0) 180 tablet 1    cyclobenzaprine (FLEXERIL) 5 MG tablet Take 1 tablet by mouth every evening 90 tablet 1    losartan (COZAAR) 25 MG tablet Take 1 tablet by mouth daily 90 tablet 1    hydrochlorothiazide (HYDRODIURIL) 12.5 MG tablet Take 1 tablet by mouth three times a week (Patient taking differently: Take 12.5 mg by mouth three times a week 09/18/18 Patient takes on Mon/Wed/Fri) 36 tablet 2    predniSONE (DELTASONE) 5 MG tablet Take 1 tablet by mouth daily 90 tablet 1    meclizine (TRAVEL SICKNESS) 25 MG CHEW Take 1 tablet by mouth every 6 hours as needed (dizziness/vertigo) 60 tablet 1    warfarin (COUMADIN) 2 MG tablet Take 1 tablet by mouth daily (Patient taking differently: Take 2 mg by mouth daily 09/18/18 1 mg tablet on Saturday AND Sunday, Excisional Debridement of the wound(s) was/were performed down through and including the removal of subcutaneous tissue. Devitalized Tissue Debrided:  biofilm and exudate      Bleeding:  Minimal    Hemostasis Achieved:  by pressure    Procedural Pain:  4  / 10     Post Procedural Pain:  2 / 10     Having prepared the wound bed, the skin graft was completed as below. Product Utilized:          [] APLIGRAF   []44 sq cm   []88 sq cm    []132 sq cm  []176 sq cm           [] DERMAGRAFT  [] 38 sq cm   []76 sq cm    []114 sq cm  []152 sq cm        [] THERASKIN  [] 13 sq cm   []37.34 sq cm             [] EpiFix   [] 1.54 sq cm disc    [] 2 pieces (3.08 sq cm)    [] 3  pieces (4.62 sq  cm)   [] 6 sq/cm    [] 16 sq cm     [] 18 sq cm   Fenestrated        [] OASIS   [] 10.5 sq cm   []21 sq cm    []35 sq cm Tri-Matrix  []70 sq cm          Tri-Matrix                    [x] PURAPLY   [x] 4 sq cm   [] 8 sq cm   [] 25sq cm                  [] Grafix      [] 1.54 sq cm disc    [] 3 sq cm    [] 6 sq cm   [] 12 sq cm   [] 25 sq cm         Skin Substitute Applied:    Performed by: LES Ho CNP    Consent obtained: Yes    Time out taken: Yes      Fenestrated: Yes    Skin Substitute was Applied to Wound Number(s):     5      Wound 05/03/18 Arterial ulcer # 5 LEFT MEDIAL PROXIMAL LOWER LEG (ONSET A WEEK AGO) (merged with #6 left distal medial leg 7/19/18)  (Active)   Wound Image   9/27/2018  1:25 PM   Wound Type Wound 10/4/2018  1:13 PM   Wound Arterial 10/4/2018  1:13 PM   Dressing Status Clean;Dry; Intact 10/4/2018  1:52 PM   Dressing Changed Changed/New 10/4/2018  1:52 PM   Dressing/Treatment Dry dressing 9/18/2018 10:20 PM   Wound Cleansed Rinsed/Irrigated with saline; Wound cleanser 10/4/2018  1:13 PM   Wound Length (cm) 2.8 cm 10/4/2018  1:35 PM   Wound Width (cm) 1.6 cm 10/4/2018  1:35 PM   Wound Depth (cm)  0.1 10/4/2018  1:35 PM   Calculated Wound Size (cm^2) (l*w) 4.48 cm^2 10/4/2018  1:35 PM   Change in Wound Size % (l*w) -714.55 10/4/2018  1:35 PM   Distance Tunneling (cm) 0 cm 10/4/2018  1:13 PM   Tunneling Position ___ O'Clock 0 10/4/2018  1:13 PM   Undermining Starts ___ O'Clock 0 10/4/2018  1:13 PM   Undermining Ends___ O'Clock 0 10/4/2018  1:13 PM   Undermining Maxium Distance (cm) 0 10/4/2018  1:13 PM   Wound Assessment Red 10/4/2018  1:13 PM   Drainage Amount Moderate 10/4/2018  1:13 PM   Drainage Description Sanguinous; Serosanguinous 10/4/2018  1:13 PM   Odor None 10/4/2018  1:13 PM   Margins Defined edges 10/4/2018  1:13 PM   Supriya-wound Assessment Calloused; Maceration 10/4/2018  1:13 PM   Non-staged Wound Description Full thickness 10/4/2018  1:13 PM   [place]%Wound Bed 0 10/4/2018  1:13 PM   Red%Wound Bed 80 10/4/2018  1:13 PM   Yellow%Wound Bed 20 10/4/2018  1:13 PM   Black%Wound Bed 0 10/4/2018  1:13 PM   Purple%Wound Bed 0 10/4/2018  1:13 PM   Other%Wound Bed 0 10/4/2018  1:13 PM   Culture Taken No 9/18/2018  6:30 PM   Debridement per physician Subcutaneous 6/14/2018  1:10 PM   Number of days: 154         Total Surface Area Covered 4.48 sq/cm    Was the Product Layered  No      Amount Wasted 0 sq/cm     Secured: Yes    Instruments used to complete placement of graft::curette    Secured With:   [] N/A  [x]Steri Strips    []Sutures     []Staples []Other     Procedural Pain: 0/10     Post Procedural Pain: 0 / 10    Response to Treatment:  Well tolerated by patient. Status of wound progress and description from last visit:   Slightly smaller in size. Plan:     Discharge instructions:  Discharge Instructions       PHYSICIAN ORDERS AND DISCHARGE INSTRUCTIONS     NOTE: Upon discharge from the [address], you will receive a patient experience survey.  We would be grateful if you would take the time to fill this survey out.     Wound care order history:                               Vascular studies: ARTERIAL STUDIES COMPLETE  Date 2/22/2018 AND 4/16/18- hours.                          EPLDX standing for long periods of time.                          KOMLC wraps/stockings in AM and remove at bedtime.                          If swelling is present, elevate legs to the level of the heart or above for 30  minutes 4-5 times a day and/or when sitting.                                                  When taking antibiotics take entire prescription as ordered by physician do not stop taking until medicine is all gone.                             Orders for this week:  10/4/2018     Puraply # 5 (2x2)  applied, covered with mepitel and steri strips by CNP. Cover with fluffed 4x4, ABD, conform and secure with tape. At home, Leave dressing in place for 1 week until seen at [address] [facility],[address] on 10/11/2018.  Home care may change 4x4, ABD and conform ONLY IF TOP LAYER becomes soiled with excessive drainage.        Follow up with Loki Asif NP  In 1 week in the wound care center     Call [phone] for any questions or concerns.     Date__________   Time____________      Physician Signature:___________________________                        Date:_______________           Treatment Note Wound 05/03/18 Arterial ulcer # 5 LEFT MEDIAL PROXIMAL LOWER LEG (ONSET A WEEK AGO) (merged with #6 left distal medial leg 7/19/18) -Dressing/Treatment:  (FLUFFED 4X4, ABD, CONFORM & TAPE )    Written Patient Dismissal Instructions Given            Electronically signed by LES Harding CNP on 10/4/2018 at 1:58 PM

## 2018-10-04 NOTE — CARE COORDINATION
[facility] Transitions Follow Up Call    10/4/2018    Patient: Gem Valenzuela  Patient : 1922   MRN: [id number]  Reason for Admission: There are no discharge diagnoses documented for the most recent discharge. Discharge Date: 18 RARS: Readmission Risk Score: 21         Care Transitions Subsequent and Final Call    Subsequent and Final Calls  Care Transitions Interventions  Other Interventions: Follow Up: Attempted to reach patient for Care Transition. No answer to phone. Message left on voicemail with CTC contact information given with request for call back.      Future Appointments  Date Time Provider Hilario Leyva   10/11/2018 1:00 PM SCHEDULE, [facility] WOUND 4 [facility] None   10/22/2018 10:00 AM Sherri Graves MD [address] [place] [address]       Jimena Kaiser RN

## 2018-10-08 ENCOUNTER — HOSPITAL ENCOUNTER (OUTPATIENT)
Age: 83
Setting detail: SPECIMEN
Discharge: HOME | End: 2018-10-08
Payer: MEDICARE

## 2018-10-08 LAB
INR BLD: 1.15 INDEX
PROTHROMBIN TIME: 13.1 SECONDS (ref 9.12–12.5)

## 2018-10-08 PROCEDURE — 85610 PROTHROMBIN TIME: CPT

## 2018-10-08 RX ORDER — WARFARIN SODIUM 3 MG/1
3 TABLET ORAL DAILY
Qty: 30 TABLET | Refills: 0 | Status: SHIPPED
Start: 2018-10-08 | End: 2018-10-15 | Stop reason: SDUPTHER

## 2018-10-10 ENCOUNTER — ANTI-COAG VISIT (OUTPATIENT)
Dept: INTERNAL MEDICINE CLINIC | Age: 83
End: 2018-10-10

## 2018-10-11 ENCOUNTER — HOSPITAL ENCOUNTER (OUTPATIENT)
Dept: WOUND CARE | Age: 83
Discharge: HOME | End: 2018-10-11
Payer: MEDICARE

## 2018-10-11 ENCOUNTER — CARE COORDINATION (OUTPATIENT)
Dept: CASE MANAGEMENT | Age: 83
End: 2018-10-11

## 2018-10-11 DIAGNOSIS — L97.922 CHRONIC ULCER OF LEFT LOWER EXTREMITY WITH FAT LAYER EXPOSED (HCC): Primary | ICD-10-CM

## 2018-10-11 PROCEDURE — 15271 SKIN SUB GRAFT TRNK/ARM/LEG: CPT

## 2018-10-11 PROCEDURE — 15271 SKIN SUB GRAFT TRNK/ARM/LEG: CPT | Performed by: NURSE PRACTITIONER

## 2018-10-11 NOTE — PROGRESS NOTES
O'Clock 0 10/11/2018 12:56 PM   Undermining Maxium Distance (cm) 0 10/11/2018 12:56 PM   Wound Assessment Red;Yellow 10/11/2018 12:56 PM   Drainage Amount Moderate 10/11/2018 12:56 PM   Drainage Description Serosanguinous 10/11/2018 12:56 PM   Odor None 10/11/2018 12:56 PM   Margins Defined edges 10/11/2018 12:56 PM   Supriya-wound Assessment Pink 10/11/2018 12:56 PM   Non-staged Wound Description Full thickness 10/11/2018 12:56 PM   [place]%Wound Bed 0 10/11/2018 12:56 PM   Red%Wound Bed 50 10/11/2018 12:56 PM   Yellow%Wound Bed 50 10/11/2018 12:56 PM   Black%Wound Bed 0 10/11/2018 12:56 PM   Purple%Wound Bed 0 10/11/2018 12:56 PM   Other%Wound Bed 0 10/11/2018 12:56 PM   Culture Taken No 9/18/2018  6:30 PM   Debridement per physician Subcutaneous 6/14/2018  1:10 PM   Number of days: 160         Total Surface Area Covered 6.72 sq/cm    Was the Product Layered  No      Amount Wasted 0 sq/cm      Secured: Yes    Instruments used to complete placement of graft::scissors and forceps    Secured With:   [] N/A  [x]Steri Strips    []Sutures     []Staples []Other    Procedural Pain: 0/10     Post Procedural Pain: 0 / 10    Response to Treatment:  Well tolerated by patient. Status of wound progress and description from last visit:  Proximal end of wound has healed and left one wound. Plan:     Discharge instructions:  Discharge Instructions                PHYSICIAN ORDERS AND DISCHARGE INSTRUCTIONS     NOTE: Upon discharge from the [address], you will receive a patient experience survey.  We would be grateful if you would take the time to fill this survey out.     Wound care order history:                               Vascular studies: ARTERIAL STUDIES COMPLETE  Date 2/22/2018 AND 4/16/18-                                             VASCULAR  INTERVENTION SCHEDULED FOR 7/2/2018                                                     Imaging:   Date               Cultures:   Date 5/3/18 CULTURE OBTAINED; 6/14/18-wound

## 2018-10-12 DIAGNOSIS — R42 VERTIGO: ICD-10-CM

## 2018-10-12 RX ORDER — MECLIZINE HYDROCHLORIDE CHEWABLE TABLETS 25 MG/1
25 TABLET, CHEWABLE ORAL EVERY 6 HOURS PRN
Qty: 60 TABLET | Refills: 1 | Status: SHIPPED | OUTPATIENT
Start: 2018-10-12 | End: 2018-11-12 | Stop reason: SDUPTHER

## 2018-10-15 ENCOUNTER — TELEPHONE (OUTPATIENT)
Dept: INTERNAL MEDICINE CLINIC | Age: 83
End: 2018-10-15

## 2018-10-15 ENCOUNTER — CARE COORDINATION (OUTPATIENT)
Dept: CASE MANAGEMENT | Age: 83
End: 2018-10-15

## 2018-10-15 RX ORDER — HYDRALAZINE HYDROCHLORIDE 25 MG/1
25 TABLET, FILM COATED ORAL 3 TIMES DAILY
Qty: 90 TABLET | Refills: 5 | Status: SHIPPED | OUTPATIENT
Start: 2018-10-15 | End: 2019-01-08 | Stop reason: SDUPTHER

## 2018-10-15 RX ORDER — WARFARIN SODIUM 3 MG/1
3 TABLET ORAL DAILY
Qty: 30 TABLET | Refills: 5 | Status: SHIPPED | OUTPATIENT
Start: 2018-10-15 | End: 2018-10-22 | Stop reason: SDUPTHER

## 2018-10-18 ENCOUNTER — HOSPITAL ENCOUNTER (OUTPATIENT)
Dept: WOUND CARE | Age: 83
Discharge: HOME | End: 2018-10-18
Payer: MEDICARE

## 2018-10-18 VITALS
TEMPERATURE: 97.9 F | DIASTOLIC BLOOD PRESSURE: 77 MMHG | HEART RATE: 85 BPM | SYSTOLIC BLOOD PRESSURE: 156 MMHG | RESPIRATION RATE: 16 BRPM

## 2018-10-18 DIAGNOSIS — L97.822 ULCER OF LEFT PRETIBIAL REGION WITH FAT LAYER EXPOSED (HCC): Primary | ICD-10-CM

## 2018-10-18 DIAGNOSIS — I73.9 PERIPHERAL VASCULAR DISEASE (HCC): ICD-10-CM

## 2018-10-18 PROCEDURE — 15271 SKIN SUB GRAFT TRNK/ARM/LEG: CPT

## 2018-10-18 ASSESSMENT — PAIN DESCRIPTION - ORIENTATION: ORIENTATION: LEFT

## 2018-10-18 ASSESSMENT — PAIN DESCRIPTION - LOCATION: LOCATION: LEG

## 2018-10-18 ASSESSMENT — PAIN DESCRIPTION - PROGRESSION: CLINICAL_PROGRESSION: NOT CHANGED

## 2018-10-18 ASSESSMENT — PAIN SCALES - GENERAL: PAINLEVEL_OUTOF10: 8

## 2018-10-18 ASSESSMENT — PAIN DESCRIPTION - DESCRIPTORS: DESCRIPTORS: ACHING;THROBBING

## 2018-10-18 ASSESSMENT — PAIN DESCRIPTION - FREQUENCY: FREQUENCY: CONTINUOUS

## 2018-10-18 ASSESSMENT — PAIN DESCRIPTION - PAIN TYPE: TYPE: ACUTE PAIN

## 2018-10-18 ASSESSMENT — PAIN DESCRIPTION - ONSET: ONSET: ON-GOING

## 2018-10-19 PROBLEM — L97.822 ULCER OF LEFT PRETIBIAL REGION WITH FAT LAYER EXPOSED (HCC): Status: ACTIVE | Noted: 2018-05-03

## 2018-10-19 NOTE — PROGRESS NOTES
Wound Care Center Progress Note and Bioengineered Skin Application      Marina Paula  AGE: 80 y.o. GENDER: female  : 1922  EPISODE DATE:  10/18/2018     Subjective:     Chief Complaint   Patient presents with    Wound Check     left leg          HISTORY of PRESENT ILLNESS      Marina Paula is a 80 y.o. 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 The wound is of moderate severity. The underlying cause of the wound is venous/arterial.   She is in for weekly f/u- had first Nu-Shield last week, tolerated well, wound bed appears improved this week. If appropriate skin graft will be applied.   Wound Pain Timing/Severity: none  Quality of pain: N/A  Severity of pain:  0 / 10   Modifying Factors: edema and venous stasis  Associated Signs/Symptoms: none        PAST MEDICAL HISTORY        Diagnosis Date    AAA (abdominal aortic aneurysm) (HCC)     Basosquamous carcinoma     L leg - Dr Nisa Drew COPD (chronic obstructive pulmonary disease) ([address]     CVA (cerebral vascular accident) ([address]     L parietal w speech deficit  ~, sx resolved    Gastro-esophageal reflux disease with esophagitis     Gout     hand swelling and pain    Hyperlipidemia     Non-healing surgical wound     Osteoarthritis     Osteoarthritis, generalized     on chr pred started by Rheum    Peripheral vascular disease ([address] 2008, 2008    S/P PTA and stents X 2 to Right leg- Dr Soy Loza Thoracic aortic aneurysm ([address]     Venous stasis ulcer ([address] 2012       PAST SURGICAL HISTORY    Past Surgical History:   Procedure Laterality Date    APPENDECTOMY      ARTERY SURGERY      STENT TO LEFT FEMORIAL AND ALSO TO RIGHT SUPRA FEMORIAL ARTERY    FRACTURE SURGERY      Left ???   -ORIF    HYSTERECTOMY, TOTAL ABDOMINAL  194    age 27-for fibroids    LEG SURGERY      2008-Right leg- PTA and Stent for PVD - Dr Muna Kernsars:   2008- Right leg -PTA and Stent- Dr Aditya Mendoza  9/7/12    L basosquamous cell CA L leg    VASCULAR SURGERY         FAMILY HISTORY    Family History   Problem Relation Age of Onset    Cancer Mother     Early Death Mother     Hearing Loss Mother     Cancer Sister        SOCIAL HISTORY    Social History   Substance Use Topics    Smoking status: Former Smoker     Packs/day: 0.00     Quit date: 1/20/2000    Smokeless tobacco: Former User      Comment: Prior Hx of 1 Pack per week, none currently    Alcohol use Yes      Comment: pt states \"one drink a day\"       ALLERGIES    Allergies   Allergen Reactions    Lisinopril      Cough      Sulfa Antibiotics     Pcn [Penicillins] Rash     Does tolerate keflex       MEDICATIONS    Current Outpatient Prescriptions on File Prior to Encounter   Medication Sig Dispense Refill    warfarin (COUMADIN) 3 MG tablet Take 1 tablet by mouth daily (Patient taking differently: Take 3 mg by mouth daily ) 30 tablet 5    hydrALAZINE (APRESOLINE) 25 MG tablet Take 1 tablet by mouth 3 times daily 90 tablet 5    meclizine (TRAVEL SICKNESS) 25 MG CHEW Take 1 tablet by mouth every 6 hours as needed (dizziness/vertigo) 60 tablet 1    ranitidine (ZANTAC) 150 MG capsule Take 1 capsule by mouth 2 times daily 180 capsule 1    clopidogrel (PLAVIX) 75 MG tablet Take 1 tablet by mouth daily 30 tablet 3    lovastatin (MEVACOR) 40 MG tablet Take 1 tablet by mouth 2 times daily TAKE ONE (1) TABLET BY MOUTH TWO TIMES DAILY jscript:void(0) 180 tablet 1    cyclobenzaprine (FLEXERIL) 5 MG tablet Take 1 tablet by mouth every evening 90 tablet 1    losartan (COZAAR) 25 MG tablet Take 1 tablet by mouth daily 90 tablet 1    hydrochlorothiazide (HYDRODIURIL) 12.5 MG tablet Take 1 tablet by mouth three times a week (Patient taking differently: Take 12.5 mg by mouth three times a week 09/18/18 Patient takes on Mon/Wed/Fri) 36 tablet 2    predniSONE (DELTASONE) 5 MG tablet Take 1 tablet by mouth daily 90 tablet 1    (l*w) -1263.64 10/18/2018  1:10 PM   Distance Tunneling (cm) 0 cm 10/18/2018 12:45 PM   Tunneling Position ___ O'Clock 0 10/18/2018 12:45 PM   Undermining Starts ___ O'Clock 0 10/18/2018 12:45 PM   Undermining Ends___ O'Clock 0 10/18/2018 12:45 PM   Undermining Maxium Distance (cm) 0 10/18/2018 12:45 PM   Wound Assessment Red;Yellow 10/18/2018 12:45 PM   Drainage Amount Moderate 10/18/2018 12:45 PM   Drainage Description Serosanguinous 10/18/2018 12:45 PM   Odor None 10/18/2018 12:45 PM   Margins Defined edges 10/18/2018 12:45 PM   Supriya-wound Assessment Maceration 10/18/2018 12:45 PM   Non-staged Wound Description Full thickness 10/18/2018 12:45 PM   [place]%Wound Bed 0 10/18/2018 12:45 PM   Red%Wound Bed 20 10/18/2018 12:45 PM   Yellow%Wound Bed 80 10/18/2018 12:45 PM   Black%Wound Bed 0 10/18/2018 12:45 PM   Purple%Wound Bed 0 10/18/2018 12:45 PM   Other%Wound Bed 0 10/18/2018 12:45 PM   Culture Taken No 9/18/2018  6:30 PM   Debridement per physician Subcutaneous 6/14/2018  1:10 PM   Number of days: 168         Total Surface Area Covered 7.5 sq/cm    Was the Product Layered  No      Amount Wasted 0 sq/cm    Reason for Waste: material provided was greater than wound size      Secured: Yes    Instruments used to complete placement of graft::scissors and forceps    Secured With:   [] N/A  [x]Steri Strips    []Sutures     []Staples []Other    Procedural Pain: 0/10     Post Procedural Pain: 0 / 10    Response to Treatment:  Well tolerated by patient. Status of wound progress and description from last visit:   Improved in appearance, but not size. Plan:     Discharge instructions:  Discharge Instructions                PHYSICIAN ORDERS AND DISCHARGE INSTRUCTIONS     NOTE: Upon discharge from the [address], you will receive a patient experience survey.  We would be grateful if you would take the time to fill this survey out.     Wound care order history:                               Vascular studies: ARTERIAL

## 2018-10-22 ENCOUNTER — CARE COORDINATION (OUTPATIENT)
Dept: CASE MANAGEMENT | Age: 83
End: 2018-10-22

## 2018-10-22 ENCOUNTER — ANTI-COAG VISIT (OUTPATIENT)
Dept: INTERNAL MEDICINE CLINIC | Age: 83
End: 2018-10-22

## 2018-10-22 ENCOUNTER — OFFICE VISIT (OUTPATIENT)
Dept: INTERNAL MEDICINE CLINIC | Age: 83
End: 2018-10-22
Payer: MEDICARE

## 2018-10-22 VITALS
DIASTOLIC BLOOD PRESSURE: 72 MMHG | OXYGEN SATURATION: 97 % | SYSTOLIC BLOOD PRESSURE: 128 MMHG | HEART RATE: 76 BPM | BODY MASS INDEX: 20.82 KG/M2 | RESPIRATION RATE: 16 BRPM | WEIGHT: 129 LBS

## 2018-10-22 DIAGNOSIS — I63.50 CEREBROVASCULAR ACCIDENT (CVA) DUE TO STENOSIS OF CEREBRAL ARTERY (HCC): Primary | ICD-10-CM

## 2018-10-22 DIAGNOSIS — I77.1 ARTERIAL INSUFFICIENCY WITH ISCHEMIC ULCER (HCC): ICD-10-CM

## 2018-10-22 DIAGNOSIS — L98.499 ARTERIAL INSUFFICIENCY WITH ISCHEMIC ULCER (HCC): ICD-10-CM

## 2018-10-22 DIAGNOSIS — D68.9 COAGULATION DEFECT (HCC): ICD-10-CM

## 2018-10-22 LAB
INTERNATIONAL NORMALIZATION RATIO, POC: 8
PROTHROMBIN TIME, POC: 96

## 2018-10-22 PROCEDURE — G8598 ASA/ANTIPLAT THER USED: HCPCS | Performed by: INTERNAL MEDICINE

## 2018-10-22 PROCEDURE — 1123F ACP DISCUSS/DSCN MKR DOCD: CPT | Performed by: INTERNAL MEDICINE

## 2018-10-22 PROCEDURE — 85610 PROTHROMBIN TIME: CPT | Performed by: INTERNAL MEDICINE

## 2018-10-22 PROCEDURE — 1090F PRES/ABSN URINE INCON ASSESS: CPT | Performed by: INTERNAL MEDICINE

## 2018-10-22 PROCEDURE — 1101F PT FALLS ASSESS-DOCD LE1/YR: CPT | Performed by: INTERNAL MEDICINE

## 2018-10-22 PROCEDURE — G8427 DOCREV CUR MEDS BY ELIG CLIN: HCPCS | Performed by: INTERNAL MEDICINE

## 2018-10-22 PROCEDURE — 99213 OFFICE O/P EST LOW 20 MIN: CPT | Performed by: INTERNAL MEDICINE

## 2018-10-22 PROCEDURE — 1036F TOBACCO NON-USER: CPT | Performed by: INTERNAL MEDICINE

## 2018-10-22 PROCEDURE — 4040F PNEUMOC VAC/ADMIN/RCVD: CPT | Performed by: INTERNAL MEDICINE

## 2018-10-22 PROCEDURE — G8482 FLU IMMUNIZE ORDER/ADMIN: HCPCS | Performed by: INTERNAL MEDICINE

## 2018-10-22 PROCEDURE — G8420 CALC BMI NORM PARAMETERS: HCPCS | Performed by: INTERNAL MEDICINE

## 2018-10-22 RX ORDER — WARFARIN SODIUM 2 MG/1
2 TABLET ORAL DAILY
Qty: 90 TABLET | Refills: 1
Start: 2018-10-22 | End: 2018-11-12 | Stop reason: SDUPTHER

## 2018-10-22 RX ORDER — DOXYCYCLINE HYCLATE 100 MG/1
100 CAPSULE ORAL 2 TIMES DAILY
COMMUNITY
Start: 2018-10-20 | End: 2018-11-01

## 2018-10-25 ENCOUNTER — HOSPITAL ENCOUNTER (OUTPATIENT)
Dept: WOUND CARE | Age: 83
Discharge: HOME | End: 2018-10-25
Payer: MEDICARE

## 2018-10-25 VITALS
DIASTOLIC BLOOD PRESSURE: 55 MMHG | TEMPERATURE: 97.9 F | HEART RATE: 80 BPM | RESPIRATION RATE: 16 BRPM | SYSTOLIC BLOOD PRESSURE: 120 MMHG

## 2018-10-25 DIAGNOSIS — L97.822 ULCER OF LEFT PRETIBIAL REGION WITH FAT LAYER EXPOSED (HCC): Primary | ICD-10-CM

## 2018-10-25 PROCEDURE — 11042 DBRDMT SUBQ TIS 1ST 20SQCM/<: CPT | Performed by: NURSE PRACTITIONER

## 2018-10-25 PROCEDURE — 11042 DBRDMT SUBQ TIS 1ST 20SQCM/<: CPT

## 2018-10-25 ASSESSMENT — PAIN DESCRIPTION - PROGRESSION: CLINICAL_PROGRESSION: NOT CHANGED

## 2018-10-25 NOTE — PROGRESS NOTES
Wound Care Center Progress Note with Procedure Note    Triston Womack  AGE: 80 y.o. GENDER: female  : 1922  EPISODE DATE:  10/25/2018     Subjective:     Chief Complaint   Patient presents with    Wound Check     left lower leg         HISTORY of PRESENT ILLNESS      Triston Womack is a 80 y.o. female who presents today for wound evaluation of Chronic venous and arterial wound(s) of the left lower leg . The wound is of moderate severity. The underlying cause of the wound is venous and arterial disease. Wound extends to fat layer. Will hold on skin graft this week. Apply for approval for [employer].   Wound Pain Timing/Severity: none  Quality of pain: N/A  Severity of pain:  0 / 10   Modifying Factors: edema, venous stasis and arterial insufficiency  Associated Signs/Symptoms: none        PAST MEDICAL HISTORY        Diagnosis Date    AAA (abdominal aortic aneurysm) (HCC)     Basosquamous carcinoma     L leg - Dr Gilmar Patrick COPD (chronic obstructive pulmonary disease) ([address]     CVA (cerebral vascular accident) ([address]     L parietal w speech deficit  ~, sx resolved    Gastro-esophageal reflux disease with esophagitis     Gout     hand swelling and pain    Hyperlipidemia     Non-healing surgical wound     Osteoarthritis     Osteoarthritis, generalized     on chr pred started by Rheum    Peripheral vascular disease ([address] 2008, 2008    S/P PTA and stents X 2 to Right leg- Dr Jessica Carranza Thoracic aortic aneurysm ([address]     Venous stasis ulcer ([address] 2012       PAST SURGICAL HISTORY    Past Surgical History:   Procedure Laterality Date    APPENDECTOMY      ARTERY SURGERY      STENT TO LEFT FEMORIAL AND ALSO TO RIGHT SUPRA FEMORIAL ARTERY    FRACTURE SURGERY      Left ???   -ORIF    HYSTERECTOMY, TOTAL ABDOMINAL  1949    age 27-for fibroids    LEG SURGERY      2008-Right leg- PTA and Stent for PVD - Dr Earl [place]:   2008- Right leg -PTA and Stent- Dr Gareth Proctor

## 2018-10-26 ENCOUNTER — TELEPHONE (OUTPATIENT)
Dept: INTERNAL MEDICINE CLINIC | Age: 83
End: 2018-10-26

## 2018-10-26 NOTE — TELEPHONE ENCOUNTER
Patient's [facility]. nurse, called stating that Iesha's INR was 3.5 PT 41.5. She has held her coumadin for 4 days now. Please advise.

## 2018-10-31 ENCOUNTER — TELEPHONE (OUTPATIENT)
Dept: INTERNAL MEDICINE CLINIC | Age: 83
End: 2018-10-31

## 2018-11-02 ENCOUNTER — TELEPHONE (OUTPATIENT)
Dept: INTERNAL MEDICINE CLINIC | Age: 83
End: 2018-11-02

## 2018-11-02 DIAGNOSIS — M25.552 LEFT HIP PAIN: Primary | ICD-10-CM

## 2018-11-02 NOTE — TELEPHONE ENCOUNTER
Spoke with Taye Huff. She will contact [employer] to get mobile xray. Order faxed to [facility].

## 2018-11-06 DIAGNOSIS — M25.552 LEFT HIP PAIN: ICD-10-CM

## 2018-11-07 ENCOUNTER — TELEPHONE (OUTPATIENT)
Dept: INTERNAL MEDICINE CLINIC | Age: 83
End: 2018-11-07

## 2018-11-08 ENCOUNTER — HOSPITAL ENCOUNTER (OUTPATIENT)
Dept: WOUND CARE | Age: 83
Discharge: HOME | End: 2018-11-08
Payer: MEDICARE

## 2018-11-08 VITALS
HEART RATE: 88 BPM | SYSTOLIC BLOOD PRESSURE: 123 MMHG | DIASTOLIC BLOOD PRESSURE: 62 MMHG | TEMPERATURE: 97 F | RESPIRATION RATE: 18 BRPM

## 2018-11-08 DIAGNOSIS — I87.311 IDIOPATHIC CHRONIC VENOUS HYPERTENSION OF RIGHT LOWER EXTREMITY WITH ULCER (HCC): ICD-10-CM

## 2018-11-08 DIAGNOSIS — L97.919 IDIOPATHIC CHRONIC VENOUS HYPERTENSION OF RIGHT LOWER EXTREMITY WITH ULCER (HCC): ICD-10-CM

## 2018-11-08 DIAGNOSIS — L97.822 ULCER OF LEFT PRETIBIAL REGION WITH FAT LAYER EXPOSED (HCC): Primary | ICD-10-CM

## 2018-11-08 PROCEDURE — 15271 SKIN SUB GRAFT TRNK/ARM/LEG: CPT | Performed by: NURSE PRACTITIONER

## 2018-11-08 PROCEDURE — 15271 SKIN SUB GRAFT TRNK/ARM/LEG: CPT

## 2018-11-08 NOTE — PROGRESS NOTES
thickness 11/8/2018  1:03 PM   [place]%Wound Bed 0 11/8/2018  1:03 PM   Red%Wound Bed 100 11/8/2018  1:03 PM   Yellow%Wound Bed 0 11/8/2018  1:03 PM   Black%Wound Bed 0 11/8/2018  1:03 PM   Purple%Wound Bed 0 11/8/2018  1:03 PM   Other%Wound Bed 0 11/8/2018  1:03 PM   Number of days: 0         Total Surface Area Covered 3.15 sq/cm    Was the Product Layered  Yes      Amount Wasted 0 sq/cm     Secured: Yes    Instruments used to complete placement of graft::curette    Secured With:   [] N/A  [x]Steri Strips    []Sutures     []Staples []Other     Procedural Pain: 0/10     Post Procedural Pain: 0 / 10    Response to Treatment:  Well tolerated by patient. Status of wound progress and description from last visit:   improved. Plan:     Discharge instructions:  Discharge Instructions       PHYSICIAN ORDERS AND DISCHARGE INSTRUCTIONS     NOTE: Upon discharge from the [address], you will receive a patient experience survey. We would be grateful if you would take the time to fill this survey out.     Wound care order history:                               Vascular studies: ARTERIAL STUDIES COMPLETE  Date 2/22/2018 AND 4/16/18-                                             VASCULAR  INTERVENTION SCHEDULED FOR 7/2/2018                                                     Imaging:   Date               Cultures:   Date 5/3/18 CULTURE OBTAINED; 6/14/18-wound culture postivie scanty growth MRSA-discussed with patient and son-doxycyline RX                                  9/79/94: Culture of left lower leg medial wound obtained              Labs/ HbA1c:   Date               Grafts: 8/30/2018: Puraply #1 Lot #[id number]                                              Puraply #2 Lot #-[id number]. 1.1B                                              Puraply #3 Lot # [id number]. 1.1B                                              Puraply # 4 Lot #: AM [id number]. 1.1B                                              Puraply #5 (2x2)

## 2018-11-12 ENCOUNTER — OFFICE VISIT (OUTPATIENT)
Dept: INTERNAL MEDICINE CLINIC | Age: 83
End: 2018-11-12
Payer: MEDICARE

## 2018-11-12 VITALS
BODY MASS INDEX: 20.18 KG/M2 | DIASTOLIC BLOOD PRESSURE: 72 MMHG | HEART RATE: 76 BPM | SYSTOLIC BLOOD PRESSURE: 114 MMHG | RESPIRATION RATE: 18 BRPM | OXYGEN SATURATION: 97 % | WEIGHT: 125 LBS

## 2018-11-12 DIAGNOSIS — M81.0 AGE-RELATED OSTEOPOROSIS WITHOUT CURRENT PATHOLOGICAL FRACTURE: ICD-10-CM

## 2018-11-12 DIAGNOSIS — R42 VERTIGO: ICD-10-CM

## 2018-11-12 DIAGNOSIS — D68.9 COAGULATION DEFECT (HCC): ICD-10-CM

## 2018-11-12 DIAGNOSIS — R30.0 DYSURIA: ICD-10-CM

## 2018-11-12 DIAGNOSIS — I10 ESSENTIAL HYPERTENSION: Primary | ICD-10-CM

## 2018-11-12 DIAGNOSIS — I63.50 CEREBROVASCULAR ACCIDENT (CVA) DUE TO STENOSIS OF CEREBRAL ARTERY (HCC): ICD-10-CM

## 2018-11-12 DIAGNOSIS — E78.2 MIXED HYPERLIPIDEMIA: ICD-10-CM

## 2018-11-12 DIAGNOSIS — M15.9 OSTEOARTHRITIS, GENERALIZED: ICD-10-CM

## 2018-11-12 LAB
INTERNATIONAL NORMALIZATION RATIO, POC: 3
PROTHROMBIN TIME, POC: 36.5

## 2018-11-12 PROCEDURE — 85610 PROTHROMBIN TIME: CPT | Performed by: INTERNAL MEDICINE

## 2018-11-12 PROCEDURE — 1123F ACP DISCUSS/DSCN MKR DOCD: CPT | Performed by: INTERNAL MEDICINE

## 2018-11-12 PROCEDURE — 99214 OFFICE O/P EST MOD 30 MIN: CPT | Performed by: INTERNAL MEDICINE

## 2018-11-12 PROCEDURE — 1036F TOBACCO NON-USER: CPT | Performed by: INTERNAL MEDICINE

## 2018-11-12 PROCEDURE — G8420 CALC BMI NORM PARAMETERS: HCPCS | Performed by: INTERNAL MEDICINE

## 2018-11-12 PROCEDURE — G8598 ASA/ANTIPLAT THER USED: HCPCS | Performed by: INTERNAL MEDICINE

## 2018-11-12 PROCEDURE — G8427 DOCREV CUR MEDS BY ELIG CLIN: HCPCS | Performed by: INTERNAL MEDICINE

## 2018-11-12 PROCEDURE — G8482 FLU IMMUNIZE ORDER/ADMIN: HCPCS | Performed by: INTERNAL MEDICINE

## 2018-11-12 PROCEDURE — 1090F PRES/ABSN URINE INCON ASSESS: CPT | Performed by: INTERNAL MEDICINE

## 2018-11-12 PROCEDURE — 4040F PNEUMOC VAC/ADMIN/RCVD: CPT | Performed by: INTERNAL MEDICINE

## 2018-11-12 PROCEDURE — 1101F PT FALLS ASSESS-DOCD LE1/YR: CPT | Performed by: INTERNAL MEDICINE

## 2018-11-12 RX ORDER — CYCLOBENZAPRINE HCL 5 MG
5 TABLET ORAL EVERY EVENING
Qty: 90 TABLET | Refills: 1 | Status: SHIPPED | OUTPATIENT
Start: 2018-11-12 | End: 2019-02-08 | Stop reason: SDUPTHER

## 2018-11-12 RX ORDER — LOVASTATIN 40 MG/1
40 TABLET ORAL 2 TIMES DAILY
Qty: 180 TABLET | Refills: 1 | Status: SHIPPED | OUTPATIENT
Start: 2018-11-12 | End: 2019-02-08 | Stop reason: SDUPTHER

## 2018-11-12 RX ORDER — MECLIZINE HYDROCHLORIDE CHEWABLE TABLETS 25 MG/1
25 TABLET, CHEWABLE ORAL EVERY 6 HOURS PRN
Qty: 60 TABLET | Refills: 1 | Status: SHIPPED | OUTPATIENT
Start: 2018-11-12 | End: 2019-02-08 | Stop reason: SDUPTHER

## 2018-11-12 RX ORDER — LOSARTAN POTASSIUM 25 MG/1
25 TABLET ORAL DAILY
Qty: 90 TABLET | Refills: 1 | Status: SHIPPED | OUTPATIENT
Start: 2018-11-12 | End: 2019-02-08 | Stop reason: SDUPTHER

## 2018-11-12 RX ORDER — PREDNISONE 1 MG/1
5 TABLET ORAL DAILY
Qty: 90 TABLET | Refills: 1 | Status: SHIPPED | OUTPATIENT
Start: 2018-11-12 | End: 2019-02-08 | Stop reason: SDUPTHER

## 2018-11-12 RX ORDER — WARFARIN SODIUM 2 MG/1
2 TABLET ORAL DAILY
Qty: 90 TABLET | Refills: 1 | Status: SHIPPED | OUTPATIENT
Start: 2018-11-12 | End: 2018-11-26 | Stop reason: SDUPTHER

## 2018-11-16 ENCOUNTER — HOSPITAL ENCOUNTER (OUTPATIENT)
Dept: WOUND CARE | Age: 83
Discharge: HOME | End: 2018-11-16
Payer: MEDICARE

## 2018-11-16 VITALS — RESPIRATION RATE: 16 BRPM | SYSTOLIC BLOOD PRESSURE: 134 MMHG | DIASTOLIC BLOOD PRESSURE: 72 MMHG | HEART RATE: 85 BPM

## 2018-11-16 DIAGNOSIS — L97.822 ULCER OF LEFT PRETIBIAL REGION WITH FAT LAYER EXPOSED (HCC): Primary | ICD-10-CM

## 2018-11-16 PROCEDURE — 15271 SKIN SUB GRAFT TRNK/ARM/LEG: CPT

## 2018-11-16 ASSESSMENT — PAIN SCALES - GENERAL: PAINLEVEL_OUTOF10: 5

## 2018-11-16 ASSESSMENT — PAIN DESCRIPTION - FREQUENCY: FREQUENCY: CONTINUOUS

## 2018-11-16 ASSESSMENT — PAIN DESCRIPTION - PROGRESSION: CLINICAL_PROGRESSION: NOT CHANGED

## 2018-11-16 ASSESSMENT — PAIN DESCRIPTION - ONSET: ONSET: ON-GOING

## 2018-11-16 ASSESSMENT — PAIN DESCRIPTION - PAIN TYPE: TYPE: ACUTE PAIN

## 2018-11-16 ASSESSMENT — PAIN DESCRIPTION - LOCATION: LOCATION: LEG

## 2018-11-16 ASSESSMENT — PAIN DESCRIPTION - ORIENTATION: ORIENTATION: LEFT

## 2018-11-16 ASSESSMENT — PAIN DESCRIPTION - DESCRIPTORS: DESCRIPTORS: BURNING

## 2018-11-16 NOTE — PROGRESS NOTES
-PTA and Stent- Dr Benites Ards  9/7/12    L basosquamous cell CA L leg    VASCULAR SURGERY         FAMILY HISTORY    Family History   Problem Relation Age of Onset    Cancer Mother     Early Death Mother     Hearing Loss Mother     Cancer Sister        SOCIAL HISTORY    Social History   Substance Use Topics    Smoking status: Former Smoker     Packs/day: 0.00     Quit date: 1/20/2000    Smokeless tobacco: Former User      Comment: Prior Hx of 1 Pack per week, none currently    Alcohol use Yes      Comment: pt states \"one drink a day\"       ALLERGIES    Allergies   Allergen Reactions    Lisinopril      Cough      Sulfa Antibiotics     Pcn [Penicillins] Rash     Does tolerate keflex       MEDICATIONS    Current Outpatient Prescriptions on File Prior to Encounter   Medication Sig Dispense Refill    losartan (COZAAR) 25 MG tablet Take 1 tablet by mouth daily 90 tablet 1    warfarin (COUMADIN) 2 MG tablet Take 1 tablet by mouth daily 90 tablet 1    lovastatin (MEVACOR) 40 MG tablet Take 1 tablet by mouth 2 times daily TAKE ONE (1) TABLET BY MOUTH TWO TIMES DAILY jscript:void(0) 180 tablet 1    predniSONE (DELTASONE) 5 MG tablet Take 1 tablet by mouth daily 90 tablet 1    cyclobenzaprine (FLEXERIL) 5 MG tablet Take 1 tablet by mouth every evening 90 tablet 1    hydrALAZINE (APRESOLINE) 25 MG tablet Take 1 tablet by mouth 3 times daily 90 tablet 5    ranitidine (ZANTAC) 150 MG capsule Take 1 capsule by mouth 2 times daily 180 capsule 1    clopidogrel (PLAVIX) 75 MG tablet Take 1 tablet by mouth daily 30 tablet 3    hydrochlorothiazide (HYDRODIURIL) 12.5 MG tablet Take 1 tablet by mouth three times a week (Patient taking differently: Take 12.5 mg by mouth three times a week 09/18/18 Patient takes on Mon/Wed/Fri) 36 tablet 2    meclizine (TRAVEL SICKNESS) 25 MG CHEW Take 1 tablet by mouth every 6 hours as needed (dizziness/vertigo) 60 tablet 1    HYDROcodone-acetaminophen (NORCO) 5-325 MG per tablet Take 1 tablet by mouth every 8 hours as needed for Pain. Jose Cruz Quan acetaminophen (TYLENOL) 325 MG tablet Take 2 tablets by mouth every 4 hours as needed for Pain 120 tablet 3     No current facility-administered medications on file prior to encounter. REVIEW OF SYSTEMS    Pertinent items are noted in HPI. Constitutional: Negative for systemic symptoms including fever, chills and malaise. Objective:      /72   Pulse 85   Resp 16   LMP  (LMP Unknown)     PHYSICAL EXAM      General: The patient is in no acute distress. Mental status:  Patient is appropriate, is  oriented to place and plan of care. Dermatologic exam: Visual inspection of the periwound reveals the skin to be normal in turgor and texture  Wound exam: see wound description below in procedure note      Assessment:     Problem List Items Addressed This Visit     WD-Ulcer of left pretibial region with fat layer exposed ([address] - Primary          Conditions above and those listed in the past history are being treated appropriately and are considered under adequate control so as not to preclude the use of a graft. SKIN SUBSTITUTES/GRAFT APPLICATIONS PROCEDURE NOTE    Indicaton:     Chronic ulcer(s) of the left leg  that has(have) failed to heal with the usual wound protocol used for greater than 30 days. See Epic chart for previous modalities and details of previous treatment. The patient has no contraindications or evidence of infection. The patient's competency and support system is appropriate for proper care and follow up for the use of this graft, therefore a graft was placed as below. Procedure: The wound bed was cleansed and prepared as necessary to accept the graft. Debridement was required. Consent obtained: Yes    Time out taken: Yes    Using curette sharp Excisional Debridement of the wound(s) was/were performed down through and including the removal of subcutaneous tissue.      Devitalized Tissue

## 2018-11-19 ENCOUNTER — HOSPITAL ENCOUNTER (OUTPATIENT)
Age: 83
Setting detail: SPECIMEN
Discharge: HOME | End: 2018-11-19
Payer: MEDICARE

## 2018-11-19 LAB
ALBUMIN SERPL-MCNC: 4 GM/DL (ref 3.4–5)
ALP BLD-CCNC: 106 IU/L (ref 40–128)
ALT SERPL-CCNC: 13 U/L (ref 10–40)
ANION GAP SERPL CALCULATED.3IONS-SCNC: 10 MMOL/L (ref 4–16)
AST SERPL-CCNC: 21 IU/L (ref 15–37)
BASOPHILS ABSOLUTE: 0.1 K/CU MM
BASOPHILS RELATIVE PERCENT: 1 % (ref 0–1)
BILIRUB SERPL-MCNC: 0.4 MG/DL (ref 0–1)
BUN BLDV-MCNC: 25 MG/DL (ref 6–23)
CALCIUM SERPL-MCNC: 9.3 MG/DL (ref 8.3–10.6)
CHLORIDE BLD-SCNC: 103 MMOL/L (ref 99–110)
CHOLESTEROL: 157 MG/DL
CO2: 27 MMOL/L (ref 21–32)
CREAT SERPL-MCNC: 1.3 MG/DL (ref 0.6–1.1)
DIFFERENTIAL TYPE: ABNORMAL
EOSINOPHILS ABSOLUTE: 0.1 K/CU MM
EOSINOPHILS RELATIVE PERCENT: 1.5 % (ref 0–3)
GFR AFRICAN AMERICAN: 46 ML/MIN/1.73M2
GFR NON-AFRICAN AMERICAN: 38 ML/MIN/1.73M2
GLUCOSE BLD-MCNC: 112 MG/DL (ref 70–99)
HCT VFR BLD CALC: 31 % (ref 37–47)
HDLC SERPL-MCNC: 75 MG/DL
HEMOGLOBIN: 9.7 GM/DL (ref 12.5–16)
IMMATURE NEUTROPHIL %: 0.2 % (ref 0–0.43)
LDL CHOLESTEROL DIRECT: 63 MG/DL
LYMPHOCYTES ABSOLUTE: 1 K/CU MM
LYMPHOCYTES RELATIVE PERCENT: 16.9 % (ref 24–44)
MCH RBC QN AUTO: 27.8 PG (ref 27–31)
MCHC RBC AUTO-ENTMCNC: 31.3 % (ref 32–36)
MCV RBC AUTO: 88.8 FL (ref 78–100)
MONOCYTES ABSOLUTE: 0.5 K/CU MM
MONOCYTES RELATIVE PERCENT: 8.5 % (ref 0–4)
NUCLEATED RBC %: 0 %
PDW BLD-RTO: 16.6 % (ref 11.7–14.9)
PLATELET # BLD: 277 K/CU MM (ref 140–440)
PMV BLD AUTO: 10.8 FL (ref 7.5–11.1)
POTASSIUM SERPL-SCNC: 4.4 MMOL/L (ref 3.5–5.1)
RBC # BLD: 3.49 M/CU MM (ref 4.2–5.4)
SEGMENTED NEUTROPHILS ABSOLUTE COUNT: 4.4 K/CU MM
SEGMENTED NEUTROPHILS RELATIVE PERCENT: 71.9 % (ref 36–66)
SODIUM BLD-SCNC: 140 MMOL/L (ref 135–145)
TOTAL IMMATURE NEUTOROPHIL: 0.01 K/CU MM
TOTAL NUCLEATED RBC: 0 K/CU MM
TOTAL PROTEIN: 6 GM/DL (ref 6.4–8.2)
TRIGL SERPL-MCNC: 119 MG/DL
TSH HIGH SENSITIVITY: 2.14 UIU/ML (ref 0.27–4.2)
VITAMIN D 25-HYDROXY: 36.2 NG/ML
WBC # BLD: 6.2 K/CU MM (ref 4–10.5)

## 2018-11-19 PROCEDURE — 80061 LIPID PANEL: CPT

## 2018-11-19 PROCEDURE — 82306 VITAMIN D 25 HYDROXY: CPT

## 2018-11-19 PROCEDURE — 85025 COMPLETE CBC W/AUTO DIFF WBC: CPT

## 2018-11-19 PROCEDURE — 80053 COMPREHEN METABOLIC PANEL: CPT

## 2018-11-19 PROCEDURE — 83721 ASSAY OF BLOOD LIPOPROTEIN: CPT

## 2018-11-19 PROCEDURE — 84443 ASSAY THYROID STIM HORMONE: CPT

## 2018-11-20 DIAGNOSIS — D64.9 ANEMIA, UNSPECIFIED TYPE: Primary | ICD-10-CM

## 2018-11-26 ENCOUNTER — TELEPHONE (OUTPATIENT)
Dept: INTERNAL MEDICINE CLINIC | Age: 83
End: 2018-11-26

## 2018-11-26 DIAGNOSIS — I63.50 CEREBROVASCULAR ACCIDENT (CVA) DUE TO STENOSIS OF CEREBRAL ARTERY (HCC): ICD-10-CM

## 2018-11-26 DIAGNOSIS — D68.9 COAGULATION DEFECT (HCC): ICD-10-CM

## 2018-11-26 RX ORDER — WARFARIN SODIUM 2 MG/1
2 TABLET ORAL DAILY
Qty: 90 TABLET | Refills: 1
Start: 2018-11-26 | End: 2019-02-08 | Stop reason: SDUPTHER

## 2018-11-29 ENCOUNTER — HOSPITAL ENCOUNTER (OUTPATIENT)
Dept: WOUND CARE | Age: 83
Discharge: HOME | End: 2018-11-29
Payer: MEDICARE

## 2018-11-29 VITALS
SYSTOLIC BLOOD PRESSURE: 184 MMHG | HEART RATE: 80 BPM | DIASTOLIC BLOOD PRESSURE: 74 MMHG | RESPIRATION RATE: 16 BRPM | TEMPERATURE: 98 F

## 2018-11-29 DIAGNOSIS — L97.822 ULCER OF LEFT PRETIBIAL REGION WITH FAT LAYER EXPOSED (HCC): Primary | ICD-10-CM

## 2018-11-29 PROCEDURE — 15271 SKIN SUB GRAFT TRNK/ARM/LEG: CPT | Performed by: NURSE PRACTITIONER

## 2018-11-29 PROCEDURE — 15271 SKIN SUB GRAFT TRNK/ARM/LEG: CPT

## 2018-11-29 ASSESSMENT — PAIN DESCRIPTION - PROGRESSION: CLINICAL_PROGRESSION: NOT CHANGED

## 2018-11-29 NOTE — PROGRESS NOTES
Wound Care Center Progress Note and Bioengineered Skin Application      Charis Lawton  AGE: 80 y.o. GENDER: female  : 1922  EPISODE DATE:  2018     Subjective:     Chief Complaint   Patient presents with    Wound Check     Left Lower Leg         HISTORY of PRESENT ILLNESS      Charis Lawton is a 80 y.o. female who presents today for wound evaluation of Chronic venous and arterial wound(s) of left lower leg. The wound is of moderate severity. The underlying cause of the wound is venous and arterial disease. If appropriate skin graft will be applied.   Wound Pain Timing/Severity: none  Quality of pain: N/A  Severity of pain:  0 / 10   Modifying Factors: edema  Associated Signs/Symptoms: drainage        PAST MEDICAL HISTORY        Diagnosis Date    AAA (abdominal aortic aneurysm) (HCC)     Basosquamous carcinoma     L leg - Dr Megan Godoy COPD (chronic obstructive pulmonary disease) ([address]     CVA (cerebral vascular accident) ([address]     L parietal w speech deficit  ~, sx resolved    Gastro-esophageal reflux disease with esophagitis     Gout     hand swelling and pain    Hyperlipidemia     Non-healing surgical wound     Osteoarthritis     Osteoarthritis, generalized     on chr pred started by Rheum    Peripheral vascular disease ([address] 2008, 2008    S/P PTA and stents X 2 to Right leg- Dr Hugo Lopez Thoracic aortic aneurysm ([address]     Venous stasis ulcer ([address] 2012       PAST SURGICAL HISTORY    Past Surgical History:   Procedure Laterality Date    APPENDECTOMY      ARTERY SURGERY      STENT TO LEFT FEMORIAL AND ALSO TO RIGHT SUPRA FEMORIAL ARTERY    FRACTURE SURGERY      Left ???   -ORIF    HYSTERECTOMY, TOTAL ABDOMINAL  194    age 27-for fibroids    LEG SURGERY      2008-Right leg- PTA and Stent for PVD - Dr Rose Mary Marrero:   2008- Right leg -PTA and Stent- Dr Rosita Art  12    L basosquamous cell CA L leg    VASCULAR SURGERY         FAMILY

## 2018-12-04 ENCOUNTER — ANTI-COAG VISIT (OUTPATIENT)
Dept: INTERNAL MEDICINE CLINIC | Age: 83
End: 2018-12-04

## 2018-12-06 ENCOUNTER — HOSPITAL ENCOUNTER (OUTPATIENT)
Dept: WOUND CARE | Age: 83
Discharge: HOME | End: 2018-12-06
Payer: MEDICARE

## 2018-12-06 VITALS
SYSTOLIC BLOOD PRESSURE: 141 MMHG | RESPIRATION RATE: 18 BRPM | TEMPERATURE: 98 F | HEART RATE: 81 BPM | DIASTOLIC BLOOD PRESSURE: 48 MMHG

## 2018-12-06 DIAGNOSIS — L97.822 ULCER OF LEFT PRETIBIAL REGION WITH FAT LAYER EXPOSED (HCC): Primary | ICD-10-CM

## 2018-12-06 DIAGNOSIS — L97.919 IDIOPATHIC CHRONIC VENOUS HYPERTENSION OF RIGHT LOWER EXTREMITY WITH ULCER (HCC): ICD-10-CM

## 2018-12-06 DIAGNOSIS — I87.332 CHRONIC VENOUS HYPERTENSION (IDIOPATHIC) WITH ULCER AND INFLAMMATION OF LEFT LOWER EXTREMITY (CODE) (HCC): ICD-10-CM

## 2018-12-06 DIAGNOSIS — L98.499 ARTERIAL INSUFFICIENCY WITH ISCHEMIC ULCER (HCC): ICD-10-CM

## 2018-12-06 DIAGNOSIS — I87.311 IDIOPATHIC CHRONIC VENOUS HYPERTENSION OF RIGHT LOWER EXTREMITY WITH ULCER (HCC): ICD-10-CM

## 2018-12-06 DIAGNOSIS — I77.1 ARTERIAL INSUFFICIENCY WITH ISCHEMIC ULCER (HCC): ICD-10-CM

## 2018-12-06 PROCEDURE — 11042 DBRDMT SUBQ TIS 1ST 20SQCM/<: CPT | Performed by: NURSE PRACTITIONER

## 2018-12-06 PROCEDURE — 11042 DBRDMT SUBQ TIS 1ST 20SQCM/<: CPT

## 2018-12-06 RX ORDER — LIDOCAINE HYDROCHLORIDE 40 MG/ML
SOLUTION TOPICAL ONCE
Status: DISCONTINUED | OUTPATIENT
Start: 2018-12-06 | End: 2018-12-07 | Stop reason: HOSPADM

## 2018-12-06 NOTE — PROGRESS NOTES
PM   Dressing Changed Changed/New 12/6/2018  1:29 PM   Wound Cleansed Wound cleanser 12/6/2018  1:00 PM   Wound Length (cm) 0 cm 12/6/2018  1:00 PM   Wound Width (cm) 0 cm 12/6/2018  1:00 PM   Wound Depth (cm) 0 cm 12/6/2018  1:00 PM   Wound Surface Area (cm^2) 0 cm^2 12/6/2018  1:00 PM   Change in Wound Size % (l*w) 100 12/6/2018  1:00 PM   Wound Volume (cm^3) 0 cm^3 12/6/2018  1:00 PM   Wound Healing % 100 12/6/2018  1:00 PM   Post-Procedure Length (cm) 0 cm 11/29/2018  1:08 PM   Post-Procedure Width (cm) 0 cm 11/29/2018  1:08 PM   Post-Procedure Depth (cm) 0 cm 11/29/2018  1:08 PM   Post-Procedure Surface Area (cm^2) 0 cm^2 11/29/2018  1:08 PM   Post-Procedure Volume (cm^3) 0 cm^3 11/29/2018  1:08 PM   Distance Tunneling (cm) 0 cm 12/6/2018  1:00 PM   Tunneling Position ___ O'Clock 0 12/6/2018  1:00 PM   Undermining Starts ___ O'Clock 0 12/6/2018  1:00 PM   Undermining Ends___ O'Clock 0 12/6/2018  1:00 PM   Undermining Maxium Distance (cm) 0 12/6/2018  1:00 PM   Wound Assessment Pink 12/6/2018  1:00 PM   Drainage Amount None 12/6/2018  1:00 PM   Drainage Description Serosanguinous 11/29/2018  1:08 PM   Odor None 12/6/2018  1:00 PM   Margins Attached edges 12/6/2018  1:00 PM   Supriya-wound Assessment Clean;Dry; Intact 12/6/2018  1:00 PM   Non-staged Wound Description Not applicable 25/1/3018  8:22 PM   [place]%Wound Bed 100 12/6/2018  1:00 PM   Red%Wound Bed 0 12/6/2018  1:00 PM   Yellow%Wound Bed 0 12/6/2018  1:00 PM   Black%Wound Bed 0 12/6/2018  1:00 PM   Purple%Wound Bed 0 12/6/2018  1:00 PM   Other%Wound Bed 0 12/6/2018  1:00 PM   Number of days: 28       Wound 05/03/18 Wound #5 Left Medial Proximal Lower Leg (Artirial) (Active)   Wound Image   11/29/2018  1:08 PM   Wound Arterial 12/6/2018  1:00 PM   Dressing Status Clean;Dry; Intact 12/6/2018  1:29 PM   Dressing Changed Changed/New 12/6/2018  1:29 PM   Wound Cleansed Wound cleanser 12/6/2018  1:00 PM   Wound Length (cm) 1.8 cm 12/6/2018  1:22 PM   Wound Width (cm) supplies will be provided by: [address]              MIN provider: [facility] with              [facility] loading:  Date               FNUNZ Medications: RX BACTROBAN 2% 22 GRAM TUBE APPLY TO WOUND BED DAILY. REFILL 0 (CALLED INTO [facility])        Wound cleansing:                           BETHEA not scrub or use excessive force.                          Wash hands with soap and water before and after dressing changes.                         Prior to applying a clean dressing, cleanse wound with normal saline,                                wound cleanser, or mild soap and water.                           Ask the physician or nurse before getting the wound(s) wet in a shower        Daily Wound management:                          Keep weight off wounds and reposition every 2 hours.                          Avoid standing for long periods of time.                          Apply wraps/stockings in AM and remove at bedtime.                          If swelling is present, elevate legs to the level of the heart or above for 30  minutes 4-5 times a day and/or when sitting.                                                  When taking antibiotics take entire prescription as ordered by physician do not stop taking until medicine is all gone.                             Orders for this week:  12/6/2018     LEFT MEDIAL LOWER LEG-- CLEANSE WITH NORMAL SALINE AND PAT DRY WITH EACH DRESSING CHANGE. APPLY BACTROBAN TO WOUND BED, FIBRACOL, ABD, CONFORM AND TAPE. CHANGE DAILY.   APPLY TUBI- D IN THE AM AND OFF IN THE PM.      LEFT PROXIMAL SHIN-  HEALED. TAKE PHOTO IN THE CLINIC TODAY 12/6/2018. APPLY FIBRACOL AND BAND AIDE. CHANGE DAILY. MONITOR CLOSELY AND KEEP CLEAN AND DRY.             Follow up with Loraine Landin NP  In 1 week in the wound care center     Call [phone] for any questions or concerns.     Date__________   Time____________      Physician Signature:___________________________                        Date:_______________                 Treatment Note Wound 11/08/18 Wound #8 Left Proximal Shin (Trauma)-Dressing/Treatment:  (Fibracol,Bandaid)  Wound 05/03/18 Wound #5 Left Medial Proximal Lower Leg (Artirial)-Dressing/Treatment:  (Bactroban, fibracol,abd,conform,tape)    Written Patient Dismissal Instructions Given            Electronically signed by LES Nunez CNP on 12/6/2018 at 2:30 PM

## 2018-12-07 ENCOUNTER — HOSPITAL ENCOUNTER (OUTPATIENT)
Age: 83
Setting detail: SPECIMEN
Discharge: HOME | End: 2018-12-07
Payer: MEDICARE

## 2018-12-07 LAB
BASOPHILS ABSOLUTE: 0.1 K/CU MM
BASOPHILS RELATIVE PERCENT: 0.6 % (ref 0–1)
DIFFERENTIAL TYPE: ABNORMAL
EOSINOPHILS ABSOLUTE: 0 K/CU MM
EOSINOPHILS RELATIVE PERCENT: 0.4 % (ref 0–3)
HCT VFR BLD CALC: 31.9 % (ref 37–47)
HEMOGLOBIN: 9.3 GM/DL (ref 12.5–16)
IMMATURE NEUTROPHIL %: 0.4 % (ref 0–0.43)
LYMPHOCYTES ABSOLUTE: 0.8 K/CU MM
LYMPHOCYTES RELATIVE PERCENT: 10.3 % (ref 24–44)
MCH RBC QN AUTO: 26.6 PG (ref 27–31)
MCHC RBC AUTO-ENTMCNC: 29.2 % (ref 32–36)
MCV RBC AUTO: 91.4 FL (ref 78–100)
MONOCYTES ABSOLUTE: 0.3 K/CU MM
MONOCYTES RELATIVE PERCENT: 3.4 % (ref 0–4)
NUCLEATED RBC %: 0 %
PDW BLD-RTO: 16.2 % (ref 11.7–14.9)
PLATELET # BLD: 286 K/CU MM (ref 140–440)
PMV BLD AUTO: 10.8 FL (ref 7.5–11.1)
RBC # BLD: 3.49 M/CU MM (ref 4.2–5.4)
SEGMENTED NEUTROPHILS ABSOLUTE COUNT: 6.9 K/CU MM
SEGMENTED NEUTROPHILS RELATIVE PERCENT: 84.9 % (ref 36–66)
TOTAL IMMATURE NEUTOROPHIL: 0.03 K/CU MM
TOTAL NUCLEATED RBC: 0 K/CU MM
WBC # BLD: 8.1 K/CU MM (ref 4–10.5)

## 2018-12-07 PROCEDURE — 85025 COMPLETE CBC W/AUTO DIFF WBC: CPT

## 2018-12-10 DIAGNOSIS — D64.9 ANEMIA, UNSPECIFIED TYPE: Primary | ICD-10-CM

## 2018-12-10 RX ORDER — LANOLIN ALCOHOL/MO/W.PET/CERES
325 CREAM (GRAM) TOPICAL
Qty: 90 TABLET | Refills: 1 | Status: SHIPPED | OUTPATIENT
Start: 2018-12-10 | End: 2019-02-08 | Stop reason: SDUPTHER

## 2018-12-10 RX ORDER — CLOPIDOGREL BISULFATE 75 MG/1
75 TABLET ORAL EVERY OTHER DAY
Qty: 15 TABLET | Refills: 3
Start: 2018-12-10 | End: 2019-02-08 | Stop reason: SDUPTHER

## 2018-12-11 ENCOUNTER — TELEPHONE (OUTPATIENT)
Dept: INTERNAL MEDICINE CLINIC | Age: 83
End: 2018-12-11

## 2018-12-13 ENCOUNTER — HOSPITAL ENCOUNTER (OUTPATIENT)
Dept: WOUND CARE | Age: 83
Discharge: HOME | End: 2018-12-13
Payer: MEDICARE

## 2018-12-13 VITALS
DIASTOLIC BLOOD PRESSURE: 44 MMHG | SYSTOLIC BLOOD PRESSURE: 120 MMHG | TEMPERATURE: 98.2 F | HEART RATE: 88 BPM | RESPIRATION RATE: 18 BRPM

## 2018-12-13 DIAGNOSIS — L97.822 ULCER OF LEFT PRETIBIAL REGION WITH FAT LAYER EXPOSED (HCC): Primary | ICD-10-CM

## 2018-12-13 PROCEDURE — 11042 DBRDMT SUBQ TIS 1ST 20SQCM/<: CPT

## 2018-12-13 PROCEDURE — 11042 DBRDMT SUBQ TIS 1ST 20SQCM/<: CPT | Performed by: NURSE PRACTITIONER

## 2018-12-13 ASSESSMENT — PAIN DESCRIPTION - PROGRESSION
CLINICAL_PROGRESSION: NOT CHANGED
CLINICAL_PROGRESSION: NOT CHANGED

## 2018-12-13 ASSESSMENT — PAIN DESCRIPTION - DESCRIPTORS: DESCRIPTORS: TENDER

## 2018-12-13 ASSESSMENT — PAIN DESCRIPTION - ORIENTATION: ORIENTATION: LEFT

## 2018-12-13 ASSESSMENT — PAIN SCALES - GENERAL: PAINLEVEL_OUTOF10: 3

## 2018-12-13 ASSESSMENT — PAIN DESCRIPTION - PAIN TYPE: TYPE: CHRONIC PAIN

## 2018-12-13 ASSESSMENT — PAIN DESCRIPTION - FREQUENCY: FREQUENCY: INTERMITTENT

## 2018-12-13 ASSESSMENT — PAIN DESCRIPTION - ONSET: ONSET: ON-GOING

## 2018-12-13 ASSESSMENT — PAIN DESCRIPTION - LOCATION: LOCATION: LEG

## 2018-12-13 NOTE — PROGRESS NOTES
to treatment:  Well tolerated by patient. Status of wound progress and description from last visit:   Improved, smaller in size. Plan:       Discharge Instructions         PHYSICIAN ORDERS AND DISCHARGE INSTRUCTIONS     NOTE: Upon discharge from the [address], you will receive a patient experience survey. We would be grateful if you would take the time to fill this survey out.     Wound care order history:                               Vascular studies: ARTERIAL STUDIES COMPLETE  Date 2/22/2018 AND 4/16/18-                                             VASCULAR  INTERVENTION SCHEDULED FOR 7/2/2018                                                     Imaging:   Date               Cultures:   Date 5/3/18 CULTURE OBTAINED; 6/14/18-wound culture postivie scanty growth MRSA-discussed with patient and son-doxycyline RX                                  2/64/50: Culture of left lower leg medial wound obtained              Labs/ HbA1c:   Date               Grafts: 8/30/2018: Puraply #1 Lot #-[id number]                                              Puraply #2 Lot #-[id number]. 1.1B                                              Puraply #3 Lot # [id number]. 1.1B                                              Puraply # 4 Lot #: [id number]. 1.1B                                              Puraply #5 (2x2) applied                                              [place] # 1 (2x3) applied on 10/11/18                                               [place] #2 (2x3) applied on 10/18/18                                              Affinity #1 (2.5x2.5) applied on 11/8/18                                              Affinity #2 (2.5x2.5) applied on 11/16/18                                              Affinity #3 (2.5 x 2.5) applied 11/29/18                  HBO:                 Antibiotics:  10/18/18 Doxycycline 100 mg PO BID x 10 days called to [address] Wound care treatments: [facility] ordered              GHOHDZVPDDOXIY:                        Consults: DR Rosalia Abad care physician: DR Brunilda Burkett     Continuing wound care orders and information:              Residence: HOME              Continue home health care [address]               Your wound-care supplies will be provided by: [address]              DME provider: [facility] with              HTQ loading:  Date               WXWNJ Medications: RX BACTROBAN 2% 22 GRAM TUBE APPLY TO WOUND BED DAILY. REFILL 0 (CALLED INTO [facility])        Wound cleansing:                           YC not scrub or use excessive force.                          Wash hands with soap and water before and after dressing changes.                         Prior to applying a clean dressing, cleanse wound with normal saline,                                wound cleanser, or mild soap and water.                           Ask the physician or nurse before getting the wound(s) wet in a shower        Daily Wound management:                          Keep weight off wounds and reposition every 2 hours.                          Avoid standing for long periods of time.                          Apply wraps/stockings in AM and remove at bedtime.                          If swelling is present, elevate legs to the level of the heart or above for 30  minutes 4-5 times a day and/or when sitting.                                                  When taking antibiotics take entire prescription as ordered by physician do not stop taking until medicine is all gone.                             Orders for this week:  12/13/2018     LEFT MEDIAL LOWER LEG-- CLEANSE WITH NORMAL SALINE AND PAT DRY WITH EACH DRESSING CHANGE. APPLY BACTROBAN TO WOUND BED, FIBRACOL, ABD, CONFORM AND TAPE. CHANGE DAILY.   APPLY TUBI- D IN THE AM AND OFF IN THE PM.      LEFT PROXIMAL SHIN-  HEALED. TAKE PHOTO IN THE CLINIC TODAY 12/6/2018.  APPLY FIBRACOL AND BAND AIDE. CHANGE DAILY. MONITOR CLOSELY AND KEEP CLEAN AND DRY.               Follow up with Rubén Garnica NP  In 1 week in the wound care center     Call [phone] for any questions or concerns.     Date__________   Time____________      Physician Signature:___________________________                        Date:_______________              Treatment Note      Written Patient Dismissal Instructions Given            Electronically signed by LES Herrera CNP on 12/13/2018 at 3:31 PM

## 2018-12-20 ENCOUNTER — HOSPITAL ENCOUNTER (OUTPATIENT)
Dept: WOUND CARE | Age: 83
Discharge: HOME | End: 2018-12-20
Payer: MEDICARE

## 2018-12-20 VITALS
TEMPERATURE: 98.4 F | HEART RATE: 80 BPM | DIASTOLIC BLOOD PRESSURE: 86 MMHG | RESPIRATION RATE: 20 BRPM | SYSTOLIC BLOOD PRESSURE: 163 MMHG

## 2018-12-20 DIAGNOSIS — L97.822 ULCER OF LEFT PRETIBIAL REGION WITH FAT LAYER EXPOSED (HCC): Primary | ICD-10-CM

## 2018-12-20 PROCEDURE — 11042 DBRDMT SUBQ TIS 1ST 20SQCM/<: CPT | Performed by: NURSE PRACTITIONER

## 2018-12-20 PROCEDURE — 11042 DBRDMT SUBQ TIS 1ST 20SQCM/<: CPT

## 2018-12-20 NOTE — PLAN OF CARE
Problem: Pain:  Intervention: Opioid analgesia side-effects  See Flwosheet  Intervention: Assess barriers to pain control  See Flwosheet  Intervention: Promote participation in pain management plan  See Flwosheet    Goal: Pain level will decrease  Pain level will decrease   Outcome: Ongoing  See Flowsheet  Goal: Control of acute pain  Control of acute pain   Outcome: Ongoing  See Flwosheet  Goal: Control of chronic pain  Control of chronic pain   Outcome: Ongoing  See Flowsheet    Problem: Wound:  Intervention: Assess pain status  See Flowsheet  Intervention: Assess wound size, appearance and drainage  See Flwosheet  Intervention: Assess pedal pulses bilaterally if patient has a foot or leg ulcer  See Flowsheet  Intervention: Doppler if unable to palpate pedal pulse  See Flowsheet    Goal: Will show signs of wound healing; wound closure and no evidence of infection  Will show signs of wound healing; wound closure and no evidence of infection   Outcome: Ongoing  See [employer]

## 2018-12-26 ENCOUNTER — TELEPHONE (OUTPATIENT)
Dept: INTERNAL MEDICINE CLINIC | Age: 83
End: 2018-12-26

## 2019-01-02 ENCOUNTER — ANTI-COAG VISIT (OUTPATIENT)
Dept: INTERNAL MEDICINE CLINIC | Age: 84
End: 2019-01-02

## 2019-01-03 ENCOUNTER — HOSPITAL ENCOUNTER (OUTPATIENT)
Dept: WOUND CARE | Age: 84
Discharge: HOME | End: 2019-01-03
Payer: MEDICARE

## 2019-01-03 VITALS
DIASTOLIC BLOOD PRESSURE: 53 MMHG | SYSTOLIC BLOOD PRESSURE: 114 MMHG | TEMPERATURE: 97 F | RESPIRATION RATE: 18 BRPM | HEART RATE: 79 BPM

## 2019-01-03 DIAGNOSIS — L97.822 ULCER OF LEFT PRETIBIAL REGION WITH FAT LAYER EXPOSED (HCC): Primary | ICD-10-CM

## 2019-01-03 PROCEDURE — 11042 DBRDMT SUBQ TIS 1ST 20SQCM/<: CPT | Performed by: NURSE PRACTITIONER

## 2019-01-03 PROCEDURE — 11042 DBRDMT SUBQ TIS 1ST 20SQCM/<: CPT

## 2019-01-03 ASSESSMENT — PAIN DESCRIPTION - PROGRESSION: CLINICAL_PROGRESSION: NOT CHANGED

## 2019-01-08 ENCOUNTER — TELEPHONE (OUTPATIENT)
Dept: INTERNAL MEDICINE CLINIC | Age: 84
End: 2019-01-08

## 2019-01-08 RX ORDER — HYDRALAZINE HYDROCHLORIDE 25 MG/1
25 TABLET, FILM COATED ORAL DAILY
Qty: 30 TABLET | Refills: 5 | Status: SHIPPED | OUTPATIENT
Start: 2019-01-08 | End: 2019-01-09

## 2019-01-08 RX ORDER — CLINDAMYCIN HYDROCHLORIDE 300 MG/1
300 CAPSULE ORAL 2 TIMES DAILY
Qty: 14 CAPSULE | Refills: 0 | Status: SHIPPED | OUTPATIENT
Start: 2019-01-08 | End: 2019-01-15

## 2019-01-09 DIAGNOSIS — R60.9 PERIPHERAL EDEMA: ICD-10-CM

## 2019-01-09 RX ORDER — HYDROCHLOROTHIAZIDE 12.5 MG/1
12.5 TABLET ORAL DAILY
Qty: 90 TABLET | Refills: 1 | Status: SHIPPED | OUTPATIENT
Start: 2019-01-09 | End: 2019-02-01 | Stop reason: SDUPTHER

## 2019-01-09 RX ORDER — HYDRALAZINE HYDROCHLORIDE 25 MG/1
25 TABLET, FILM COATED ORAL 3 TIMES DAILY
Qty: 90 TABLET | Refills: 5
Start: 2019-01-09 | End: 2019-05-07 | Stop reason: SDUPTHER

## 2019-01-16 ENCOUNTER — TELEPHONE (OUTPATIENT)
Dept: INTERNAL MEDICINE CLINIC | Age: 84
End: 2019-01-16

## 2019-01-17 ENCOUNTER — HOSPITAL ENCOUNTER (OUTPATIENT)
Dept: WOUND CARE | Age: 84
Discharge: HOME | End: 2019-01-17
Payer: MEDICARE

## 2019-01-17 VITALS
DIASTOLIC BLOOD PRESSURE: 71 MMHG | TEMPERATURE: 98.3 F | SYSTOLIC BLOOD PRESSURE: 153 MMHG | RESPIRATION RATE: 18 BRPM | HEART RATE: 79 BPM

## 2019-01-17 DIAGNOSIS — L97.822 ULCER OF LEFT PRETIBIAL REGION WITH FAT LAYER EXPOSED (HCC): Primary | ICD-10-CM

## 2019-01-17 PROCEDURE — 11042 DBRDMT SUBQ TIS 1ST 20SQCM/<: CPT | Performed by: NURSE PRACTITIONER

## 2019-01-17 PROCEDURE — 11042 DBRDMT SUBQ TIS 1ST 20SQCM/<: CPT

## 2019-01-17 PROCEDURE — 87073 CULTURE BACTERIA ANAEROBIC: CPT

## 2019-01-17 PROCEDURE — 87071 CULTURE AEROBIC QUANT OTHER: CPT

## 2019-01-21 LAB
CULTURE: NORMAL
CULTURE: NORMAL
Lab: NORMAL
REPORT STATUS: NORMAL
SPECIMEN: NORMAL

## 2019-01-29 ENCOUNTER — ANTI-COAG VISIT (OUTPATIENT)
Dept: INTERNAL MEDICINE CLINIC | Age: 84
End: 2019-01-29

## 2019-01-31 ENCOUNTER — HOSPITAL ENCOUNTER (OUTPATIENT)
Dept: WOUND CARE | Age: 84
Discharge: HOME | End: 2019-01-31
Payer: MEDICARE

## 2019-01-31 VITALS
SYSTOLIC BLOOD PRESSURE: 143 MMHG | TEMPERATURE: 97.8 F | HEART RATE: 84 BPM | DIASTOLIC BLOOD PRESSURE: 63 MMHG | RESPIRATION RATE: 16 BRPM

## 2019-01-31 DIAGNOSIS — I87.313 CHRONIC VENOUS HYPERTENSION (IDIOPATHIC) WITH ULCER OF BILATERAL LOWER EXTREMITY (CODE) (HCC): ICD-10-CM

## 2019-01-31 DIAGNOSIS — L97.822 ULCER OF LEFT PRETIBIAL REGION WITH FAT LAYER EXPOSED (HCC): Primary | ICD-10-CM

## 2019-01-31 DIAGNOSIS — L97.812 NON-PRESSURE CHRONIC ULCER OF OTHER PART OF RIGHT LOWER LEG WITH FAT LAYER EXPOSED (HCC): ICD-10-CM

## 2019-01-31 PROCEDURE — 11042 DBRDMT SUBQ TIS 1ST 20SQCM/<: CPT | Performed by: NURSE PRACTITIONER

## 2019-01-31 PROCEDURE — 11042 DBRDMT SUBQ TIS 1ST 20SQCM/<: CPT

## 2019-02-01 ENCOUNTER — TELEPHONE (OUTPATIENT)
Dept: INTERNAL MEDICINE CLINIC | Age: 84
End: 2019-02-01

## 2019-02-01 DIAGNOSIS — R60.9 PERIPHERAL EDEMA: ICD-10-CM

## 2019-02-01 RX ORDER — HYDROCHLOROTHIAZIDE 12.5 MG/1
12.5 TABLET ORAL SEE ADMIN INSTRUCTIONS
Qty: 36 TABLET | Refills: 1 | Status: SHIPPED | OUTPATIENT
Start: 2019-02-01 | End: 2019-02-08 | Stop reason: SDUPTHER

## 2019-02-05 ENCOUNTER — HOSPITAL ENCOUNTER (OUTPATIENT)
Dept: WOUND CARE | Age: 84
Discharge: HOME | End: 2019-02-05
Payer: MEDICARE

## 2019-02-05 VITALS
TEMPERATURE: 98.5 F | DIASTOLIC BLOOD PRESSURE: 59 MMHG | HEART RATE: 79 BPM | SYSTOLIC BLOOD PRESSURE: 133 MMHG | RESPIRATION RATE: 17 BRPM

## 2019-02-05 DIAGNOSIS — L97.812 NON-PRESSURE CHRONIC ULCER OF OTHER PART OF RIGHT LOWER LEG WITH FAT LAYER EXPOSED (HCC): ICD-10-CM

## 2019-02-05 DIAGNOSIS — I87.313 CHRONIC VENOUS HYPERTENSION (IDIOPATHIC) WITH ULCER OF BILATERAL LOWER EXTREMITY (CODE) (HCC): Primary | ICD-10-CM

## 2019-02-05 DIAGNOSIS — L97.922 TRAUMATIC ULCER OF LEFT LOWER LEG WITH FAT LAYER EXPOSED (HCC): ICD-10-CM

## 2019-02-05 DIAGNOSIS — L97.822 ULCER OF LEFT PRETIBIAL REGION WITH FAT LAYER EXPOSED (HCC): ICD-10-CM

## 2019-02-05 PROCEDURE — 11042 DBRDMT SUBQ TIS 1ST 20SQCM/<: CPT | Performed by: NURSE PRACTITIONER

## 2019-02-05 PROCEDURE — 11042 DBRDMT SUBQ TIS 1ST 20SQCM/<: CPT

## 2019-02-05 ASSESSMENT — PAIN DESCRIPTION - PROGRESSION: CLINICAL_PROGRESSION: NOT CHANGED

## 2019-02-05 ASSESSMENT — PAIN DESCRIPTION - LOCATION: LOCATION: LEG

## 2019-02-05 ASSESSMENT — PAIN DESCRIPTION - ORIENTATION: ORIENTATION: LEFT

## 2019-02-05 ASSESSMENT — PAIN DESCRIPTION - DESCRIPTORS: DESCRIPTORS: TENDER

## 2019-02-05 ASSESSMENT — PAIN SCALES - GENERAL: PAINLEVEL_OUTOF10: 8

## 2019-02-05 ASSESSMENT — PAIN DESCRIPTION - FREQUENCY: FREQUENCY: INTERMITTENT

## 2019-02-05 ASSESSMENT — PAIN DESCRIPTION - PAIN TYPE: TYPE: ACUTE PAIN

## 2019-02-05 ASSESSMENT — PAIN DESCRIPTION - ONSET: ONSET: ON-GOING

## 2019-02-06 ENCOUNTER — TELEPHONE (OUTPATIENT)
Dept: INTERNAL MEDICINE CLINIC | Age: 84
End: 2019-02-06

## 2019-02-08 ENCOUNTER — OFFICE VISIT (OUTPATIENT)
Dept: INTERNAL MEDICINE CLINIC | Age: 84
End: 2019-02-08
Payer: MEDICARE

## 2019-02-08 VITALS
BODY MASS INDEX: 20.92 KG/M2 | HEART RATE: 79 BPM | RESPIRATION RATE: 16 BRPM | WEIGHT: 129.6 LBS | SYSTOLIC BLOOD PRESSURE: 102 MMHG | OXYGEN SATURATION: 92 % | DIASTOLIC BLOOD PRESSURE: 60 MMHG

## 2019-02-08 DIAGNOSIS — I72.9 PSEUDOANEURYSM (HCC): ICD-10-CM

## 2019-02-08 DIAGNOSIS — I73.9 CLAUDICATION IN PERIPHERAL VASCULAR DISEASE (HCC): ICD-10-CM

## 2019-02-08 DIAGNOSIS — I70.242 ATHEROSCLEROSIS OF NATIVE ARTERY OF BOTH LOWER EXTREMITIES WITH BILATERAL ULCERATION OF CALVES (HCC): ICD-10-CM

## 2019-02-08 DIAGNOSIS — I71.40 ABDOMINAL AORTIC ANEURYSM (AAA) WITHOUT RUPTURE: ICD-10-CM

## 2019-02-08 DIAGNOSIS — J42 CHRONIC BRONCHITIS, UNSPECIFIED CHRONIC BRONCHITIS TYPE (HCC): ICD-10-CM

## 2019-02-08 DIAGNOSIS — R42 VERTIGO: ICD-10-CM

## 2019-02-08 DIAGNOSIS — I71.20 THORACIC AORTIC ANEURYSM WITHOUT RUPTURE: ICD-10-CM

## 2019-02-08 DIAGNOSIS — I70.232 ATHEROSCLEROSIS OF NATIVE ARTERY OF BOTH LOWER EXTREMITIES WITH BILATERAL ULCERATION OF CALVES (HCC): ICD-10-CM

## 2019-02-08 DIAGNOSIS — R60.9 PERIPHERAL EDEMA: ICD-10-CM

## 2019-02-08 DIAGNOSIS — I73.9 PERIPHERAL VASCULAR DISEASE (HCC): ICD-10-CM

## 2019-02-08 DIAGNOSIS — D68.9 COAGULATION DEFECT (HCC): ICD-10-CM

## 2019-02-08 DIAGNOSIS — I63.50 CEREBROVASCULAR ACCIDENT (CVA) DUE TO STENOSIS OF CEREBRAL ARTERY (HCC): Primary | ICD-10-CM

## 2019-02-08 DIAGNOSIS — M15.9 OSTEOARTHRITIS, GENERALIZED: ICD-10-CM

## 2019-02-08 DIAGNOSIS — K21.00 GASTRO-ESOPHAGEAL REFLUX DISEASE WITH ESOPHAGITIS: ICD-10-CM

## 2019-02-08 DIAGNOSIS — E78.2 MIXED HYPERLIPIDEMIA: ICD-10-CM

## 2019-02-08 DIAGNOSIS — L97.922 VASCULITIC ULCER OF LEFT LOWER EXTREMITY WITH FAT LAYER EXPOSED (HCC): ICD-10-CM

## 2019-02-08 DIAGNOSIS — I10 ESSENTIAL HYPERTENSION: ICD-10-CM

## 2019-02-08 PROBLEM — R52 UNCONTROLLED PAIN: Status: RESOLVED | Noted: 2018-04-19 | Resolved: 2019-02-08

## 2019-02-08 PROCEDURE — 3023F SPIROM DOC REV: CPT | Performed by: INTERNAL MEDICINE

## 2019-02-08 PROCEDURE — G8420 CALC BMI NORM PARAMETERS: HCPCS | Performed by: INTERNAL MEDICINE

## 2019-02-08 PROCEDURE — G8926 SPIRO NO PERF OR DOC: HCPCS | Performed by: INTERNAL MEDICINE

## 2019-02-08 PROCEDURE — G8482 FLU IMMUNIZE ORDER/ADMIN: HCPCS | Performed by: INTERNAL MEDICINE

## 2019-02-08 PROCEDURE — 1101F PT FALLS ASSESS-DOCD LE1/YR: CPT | Performed by: INTERNAL MEDICINE

## 2019-02-08 PROCEDURE — 4040F PNEUMOC VAC/ADMIN/RCVD: CPT | Performed by: INTERNAL MEDICINE

## 2019-02-08 PROCEDURE — G8598 ASA/ANTIPLAT THER USED: HCPCS | Performed by: INTERNAL MEDICINE

## 2019-02-08 PROCEDURE — 1123F ACP DISCUSS/DSCN MKR DOCD: CPT | Performed by: INTERNAL MEDICINE

## 2019-02-08 PROCEDURE — G8427 DOCREV CUR MEDS BY ELIG CLIN: HCPCS | Performed by: INTERNAL MEDICINE

## 2019-02-08 PROCEDURE — 1036F TOBACCO NON-USER: CPT | Performed by: INTERNAL MEDICINE

## 2019-02-08 PROCEDURE — 99214 OFFICE O/P EST MOD 30 MIN: CPT | Performed by: INTERNAL MEDICINE

## 2019-02-08 PROCEDURE — 1090F PRES/ABSN URINE INCON ASSESS: CPT | Performed by: INTERNAL MEDICINE

## 2019-02-08 RX ORDER — LANOLIN ALCOHOL/MO/W.PET/CERES
325 CREAM (GRAM) TOPICAL
Qty: 90 TABLET | Refills: 1 | Status: SHIPPED | OUTPATIENT
Start: 2019-02-08 | End: 2019-05-10 | Stop reason: SDUPTHER

## 2019-02-08 RX ORDER — HYDROCHLOROTHIAZIDE 12.5 MG/1
12.5 TABLET ORAL SEE ADMIN INSTRUCTIONS
Qty: 48 TABLET | Refills: 3 | Status: SHIPPED | OUTPATIENT
Start: 2019-02-08 | End: 2019-02-15 | Stop reason: SDUPTHER

## 2019-02-08 RX ORDER — MECLIZINE HYDROCHLORIDE CHEWABLE TABLETS 25 MG/1
25 TABLET, CHEWABLE ORAL EVERY 6 HOURS PRN
Qty: 90 TABLET | Refills: 1 | Status: SHIPPED | OUTPATIENT
Start: 2019-02-08 | End: 2019-05-10 | Stop reason: SDUPTHER

## 2019-02-08 RX ORDER — CYCLOBENZAPRINE HCL 5 MG
5 TABLET ORAL EVERY EVENING
Qty: 90 TABLET | Refills: 1 | Status: SHIPPED | OUTPATIENT
Start: 2019-02-08 | End: 2019-05-10 | Stop reason: SDUPTHER

## 2019-02-08 RX ORDER — PREDNISONE 1 MG/1
5 TABLET ORAL DAILY
Qty: 90 TABLET | Refills: 1 | Status: SHIPPED | OUTPATIENT
Start: 2019-02-08 | End: 2019-07-26 | Stop reason: SDUPTHER

## 2019-02-08 RX ORDER — LOSARTAN POTASSIUM 25 MG/1
25 TABLET ORAL DAILY
Qty: 90 TABLET | Refills: 1 | Status: SHIPPED | OUTPATIENT
Start: 2019-02-08 | End: 2019-04-09 | Stop reason: SDUPTHER

## 2019-02-08 RX ORDER — CLOPIDOGREL BISULFATE 75 MG/1
75 TABLET ORAL EVERY OTHER DAY
Qty: 45 TABLET | Refills: 3 | Status: SHIPPED | OUTPATIENT
Start: 2019-02-08 | End: 2019-08-12 | Stop reason: SDUPTHER

## 2019-02-08 RX ORDER — WARFARIN SODIUM 2 MG/1
2 TABLET ORAL DAILY
Qty: 90 TABLET | Refills: 1 | Status: SHIPPED | OUTPATIENT
Start: 2019-02-08 | End: 2019-05-10 | Stop reason: SDUPTHER

## 2019-02-08 RX ORDER — LOVASTATIN 40 MG/1
40 TABLET ORAL 2 TIMES DAILY
Qty: 180 TABLET | Refills: 1 | Status: SHIPPED | OUTPATIENT
Start: 2019-02-08 | End: 2019-05-10 | Stop reason: SDUPTHER

## 2019-02-08 RX ORDER — RANITIDINE 150 MG/1
150 CAPSULE ORAL 2 TIMES DAILY
Qty: 180 CAPSULE | Refills: 1 | Status: SHIPPED | OUTPATIENT
Start: 2019-02-08 | End: 2019-04-09 | Stop reason: SDUPTHER

## 2019-02-08 ASSESSMENT — PATIENT HEALTH QUESTIONNAIRE - PHQ9
SUM OF ALL RESPONSES TO PHQ QUESTIONS 1-9: 0
2. FEELING DOWN, DEPRESSED OR HOPELESS: 0
SUM OF ALL RESPONSES TO PHQ9 QUESTIONS 1 & 2: 0
SUM OF ALL RESPONSES TO PHQ QUESTIONS 1-9: 0
1. LITTLE INTEREST OR PLEASURE IN DOING THINGS: 0

## 2019-02-12 ENCOUNTER — TELEPHONE (OUTPATIENT)
Dept: INTERNAL MEDICINE CLINIC | Age: 84
End: 2019-02-12

## 2019-02-14 ENCOUNTER — HOSPITAL ENCOUNTER (OUTPATIENT)
Dept: WOUND CARE | Age: 84
Discharge: HOME | End: 2019-02-14
Payer: MEDICARE

## 2019-02-14 VITALS
DIASTOLIC BLOOD PRESSURE: 55 MMHG | TEMPERATURE: 98.1 F | HEART RATE: 79 BPM | SYSTOLIC BLOOD PRESSURE: 118 MMHG | RESPIRATION RATE: 18 BRPM

## 2019-02-14 DIAGNOSIS — L97.922 TRAUMATIC ULCER OF LEFT LOWER LEG WITH FAT LAYER EXPOSED (HCC): ICD-10-CM

## 2019-02-14 DIAGNOSIS — I87.313 CHRONIC VENOUS HYPERTENSION (IDIOPATHIC) WITH ULCER OF BILATERAL LOWER EXTREMITY (CODE) (HCC): Primary | ICD-10-CM

## 2019-02-14 DIAGNOSIS — L97.822 ULCER OF LEFT PRETIBIAL REGION WITH FAT LAYER EXPOSED (HCC): ICD-10-CM

## 2019-02-14 PROCEDURE — 87071 CULTURE AEROBIC QUANT OTHER: CPT

## 2019-02-14 PROCEDURE — 11042 DBRDMT SUBQ TIS 1ST 20SQCM/<: CPT

## 2019-02-14 PROCEDURE — 11042 DBRDMT SUBQ TIS 1ST 20SQCM/<: CPT | Performed by: NURSE PRACTITIONER

## 2019-02-14 PROCEDURE — 87073 CULTURE BACTERIA ANAEROBIC: CPT

## 2019-02-14 RX ORDER — LIDOCAINE HYDROCHLORIDE 40 MG/ML
SOLUTION TOPICAL ONCE
Status: DISCONTINUED | OUTPATIENT
Start: 2019-02-14 | End: 2019-02-15 | Stop reason: HOSPADM

## 2019-02-14 ASSESSMENT — PAIN - FUNCTIONAL ASSESSMENT: PAIN_FUNCTIONAL_ASSESSMENT: PREVENTS OR INTERFERES SOME ACTIVE ACTIVITIES AND ADLS

## 2019-02-14 ASSESSMENT — PAIN DESCRIPTION - PROGRESSION: CLINICAL_PROGRESSION: NOT CHANGED

## 2019-02-14 ASSESSMENT — PAIN DESCRIPTION - PAIN TYPE: TYPE: ACUTE PAIN

## 2019-02-14 ASSESSMENT — PAIN DESCRIPTION - FREQUENCY: FREQUENCY: INTERMITTENT

## 2019-02-14 ASSESSMENT — PAIN SCALES - GENERAL: PAINLEVEL_OUTOF10: 8

## 2019-02-14 ASSESSMENT — PAIN DESCRIPTION - ONSET: ONSET: ON-GOING

## 2019-02-14 ASSESSMENT — PAIN DESCRIPTION - DESCRIPTORS: DESCRIPTORS: OTHER (COMMENT)

## 2019-02-14 ASSESSMENT — PAIN DESCRIPTION - ORIENTATION: ORIENTATION: LEFT

## 2019-02-14 ASSESSMENT — PAIN DESCRIPTION - LOCATION: LOCATION: LEG

## 2019-02-15 ENCOUNTER — HOSPITAL ENCOUNTER (OUTPATIENT)
Age: 84
Setting detail: SPECIMEN
Discharge: HOME | End: 2019-02-15
Payer: MEDICARE

## 2019-02-15 DIAGNOSIS — R60.9 PERIPHERAL EDEMA: ICD-10-CM

## 2019-02-15 DIAGNOSIS — I63.30 CEREBROVASCULAR ACCIDENT (CVA) DUE TO THROMBOSIS OF CEREBRAL ARTERY (HCC): ICD-10-CM

## 2019-02-15 DIAGNOSIS — N18.2 CHRONIC RENAL IMPAIRMENT, STAGE 2 (MILD): Primary | ICD-10-CM

## 2019-02-15 LAB
ALBUMIN SERPL-MCNC: 3.7 GM/DL (ref 3.4–5)
ALP BLD-CCNC: 78 IU/L (ref 40–128)
ALT SERPL-CCNC: 13 U/L (ref 10–40)
ANION GAP SERPL CALCULATED.3IONS-SCNC: 12 MMOL/L (ref 4–16)
AST SERPL-CCNC: 22 IU/L (ref 15–37)
BASOPHILS ABSOLUTE: 0 K/CU MM
BASOPHILS RELATIVE PERCENT: 0.6 % (ref 0–1)
BILIRUB SERPL-MCNC: 0.4 MG/DL (ref 0–1)
BUN BLDV-MCNC: 33 MG/DL (ref 6–23)
CALCIUM SERPL-MCNC: 8.9 MG/DL (ref 8.3–10.6)
CHLORIDE BLD-SCNC: 97 MMOL/L (ref 99–110)
CHOLESTEROL: 140 MG/DL
CO2: 26 MMOL/L (ref 21–32)
CREAT SERPL-MCNC: 2.1 MG/DL (ref 0.6–1.1)
DIFFERENTIAL TYPE: ABNORMAL
EOSINOPHILS ABSOLUTE: 0.1 K/CU MM
EOSINOPHILS RELATIVE PERCENT: 1.8 % (ref 0–3)
GFR AFRICAN AMERICAN: 26 ML/MIN/1.73M2
GFR NON-AFRICAN AMERICAN: 22 ML/MIN/1.73M2
GLUCOSE BLD-MCNC: 131 MG/DL (ref 70–99)
HCT VFR BLD CALC: 33.7 % (ref 37–47)
HDLC SERPL-MCNC: 70 MG/DL
HEMOGLOBIN: 10.1 GM/DL (ref 12.5–16)
IMMATURE NEUTROPHIL %: 0.4 % (ref 0–0.43)
INR BLD: 1.55 INDEX
LDL CHOLESTEROL DIRECT: 66 MG/DL
LYMPHOCYTES ABSOLUTE: 1.4 K/CU MM
LYMPHOCYTES RELATIVE PERCENT: 19.7 % (ref 24–44)
MCH RBC QN AUTO: 27.5 PG (ref 27–31)
MCHC RBC AUTO-ENTMCNC: 30 % (ref 32–36)
MCV RBC AUTO: 91.8 FL (ref 78–100)
MONOCYTES ABSOLUTE: 0.7 K/CU MM
MONOCYTES RELATIVE PERCENT: 9.8 % (ref 0–4)
NUCLEATED RBC %: 0 %
PDW BLD-RTO: 16.3 % (ref 11.7–14.9)
PLATELET # BLD: 301 K/CU MM (ref 140–440)
PMV BLD AUTO: 10.8 FL (ref 7.5–11.1)
POTASSIUM SERPL-SCNC: 4.4 MMOL/L (ref 3.5–5.1)
PROTHROMBIN TIME: 17.6 SECONDS (ref 9.12–12.5)
RBC # BLD: 3.67 M/CU MM (ref 4.2–5.4)
SEGMENTED NEUTROPHILS ABSOLUTE COUNT: 4.8 K/CU MM
SEGMENTED NEUTROPHILS RELATIVE PERCENT: 67.7 % (ref 36–66)
SODIUM BLD-SCNC: 135 MMOL/L (ref 135–145)
TOTAL IMMATURE NEUTOROPHIL: 0.03 K/CU MM
TOTAL NUCLEATED RBC: 0 K/CU MM
TOTAL PROTEIN: 5.8 GM/DL (ref 6.4–8.2)
TRIGL SERPL-MCNC: 81 MG/DL
WBC # BLD: 7.2 K/CU MM (ref 4–10.5)

## 2019-02-15 PROCEDURE — 80061 LIPID PANEL: CPT

## 2019-02-15 PROCEDURE — 85025 COMPLETE CBC W/AUTO DIFF WBC: CPT

## 2019-02-15 PROCEDURE — 80053 COMPREHEN METABOLIC PANEL: CPT

## 2019-02-15 PROCEDURE — 83721 ASSAY OF BLOOD LIPOPROTEIN: CPT

## 2019-02-15 PROCEDURE — 85610 PROTHROMBIN TIME: CPT

## 2019-02-15 RX ORDER — HYDROCHLOROTHIAZIDE 12.5 MG/1
12.5 TABLET ORAL SEE ADMIN INSTRUCTIONS
Qty: 48 TABLET | Refills: 3
Start: 2019-02-15 | End: 2019-05-10 | Stop reason: SDUPTHER

## 2019-02-18 LAB
CULTURE: NORMAL
CULTURE: NORMAL
Lab: NORMAL
REPORT STATUS: NORMAL
SPECIMEN: NORMAL

## 2019-02-21 ENCOUNTER — HOSPITAL ENCOUNTER (OUTPATIENT)
Dept: WOUND CARE | Age: 84
Discharge: HOME | End: 2019-02-21
Payer: MEDICARE

## 2019-02-21 VITALS
TEMPERATURE: 98.4 F | SYSTOLIC BLOOD PRESSURE: 176 MMHG | RESPIRATION RATE: 18 BRPM | HEART RATE: 78 BPM | DIASTOLIC BLOOD PRESSURE: 58 MMHG

## 2019-02-21 DIAGNOSIS — L97.922 TRAUMATIC ULCER OF LEFT LOWER LEG WITH FAT LAYER EXPOSED (HCC): ICD-10-CM

## 2019-02-21 DIAGNOSIS — I87.313 CHRONIC VENOUS HYPERTENSION (IDIOPATHIC) WITH ULCER OF BILATERAL LOWER EXTREMITY (CODE) (HCC): Primary | ICD-10-CM

## 2019-02-21 DIAGNOSIS — L97.812 NON-PRESSURE CHRONIC ULCER OF OTHER PART OF RIGHT LOWER LEG WITH FAT LAYER EXPOSED (HCC): ICD-10-CM

## 2019-02-21 PROCEDURE — 11042 DBRDMT SUBQ TIS 1ST 20SQCM/<: CPT

## 2019-02-21 PROCEDURE — 11042 DBRDMT SUBQ TIS 1ST 20SQCM/<: CPT | Performed by: NURSE PRACTITIONER

## 2019-02-21 RX ORDER — LIDOCAINE HYDROCHLORIDE 40 MG/ML
SOLUTION TOPICAL ONCE
Status: DISCONTINUED | OUTPATIENT
Start: 2019-02-21 | End: 2019-02-22 | Stop reason: HOSPADM

## 2019-02-21 ASSESSMENT — PAIN SCALES - WONG BAKER: WONGBAKER_NUMERICALRESPONSE: 0

## 2019-02-21 ASSESSMENT — PAIN SCALES - GENERAL: PAINLEVEL_OUTOF10: 0

## 2019-02-27 ENCOUNTER — TELEPHONE (OUTPATIENT)
Dept: INTERNAL MEDICINE CLINIC | Age: 84
End: 2019-02-27

## 2019-02-28 ENCOUNTER — HOSPITAL ENCOUNTER (OUTPATIENT)
Dept: WOUND CARE | Age: 84
Discharge: HOME | End: 2019-02-28
Payer: MEDICARE

## 2019-02-28 ENCOUNTER — ANTI-COAG VISIT (OUTPATIENT)
Dept: INTERNAL MEDICINE CLINIC | Age: 84
End: 2019-02-28

## 2019-02-28 VITALS
RESPIRATION RATE: 18 BRPM | DIASTOLIC BLOOD PRESSURE: 70 MMHG | SYSTOLIC BLOOD PRESSURE: 184 MMHG | HEART RATE: 83 BPM | TEMPERATURE: 98.7 F

## 2019-02-28 DIAGNOSIS — L97.922 TRAUMATIC ULCER OF LEFT LOWER LEG WITH FAT LAYER EXPOSED (HCC): Primary | ICD-10-CM

## 2019-02-28 PROCEDURE — 99212 OFFICE O/P EST SF 10 MIN: CPT | Performed by: NURSE PRACTITIONER

## 2019-02-28 PROCEDURE — 99213 OFFICE O/P EST LOW 20 MIN: CPT

## 2019-02-28 ASSESSMENT — PAIN SCALES - WONG BAKER: WONGBAKER_NUMERICALRESPONSE: 0

## 2019-02-28 ASSESSMENT — PAIN SCALES - GENERAL: PAINLEVEL_OUTOF10: 0

## 2019-03-07 ENCOUNTER — HOSPITAL ENCOUNTER (OUTPATIENT)
Dept: WOUND CARE | Age: 84
Discharge: HOME | End: 2019-03-07
Payer: MEDICARE

## 2019-03-07 VITALS
RESPIRATION RATE: 18 BRPM | SYSTOLIC BLOOD PRESSURE: 174 MMHG | TEMPERATURE: 97.4 F | HEART RATE: 85 BPM | DIASTOLIC BLOOD PRESSURE: 72 MMHG

## 2019-03-07 DIAGNOSIS — L97.922 TRAUMATIC ULCER OF LEFT LOWER LEG WITH FAT LAYER EXPOSED (HCC): ICD-10-CM

## 2019-03-07 DIAGNOSIS — I73.9 PERIPHERAL VASCULAR DISEASE (HCC): Primary | ICD-10-CM

## 2019-03-07 DIAGNOSIS — L97.812 NON-PRESSURE CHRONIC ULCER OF OTHER PART OF RIGHT LOWER LEG WITH FAT LAYER EXPOSED (HCC): ICD-10-CM

## 2019-03-07 PROBLEM — L97.822 ULCER OF LEFT PRETIBIAL REGION WITH FAT LAYER EXPOSED (HCC): Status: RESOLVED | Noted: 2018-05-03 | Resolved: 2019-03-07

## 2019-03-07 PROCEDURE — 11042 DBRDMT SUBQ TIS 1ST 20SQCM/<: CPT

## 2019-03-07 PROCEDURE — 11042 DBRDMT SUBQ TIS 1ST 20SQCM/<: CPT | Performed by: NURSE PRACTITIONER

## 2019-03-14 ENCOUNTER — HOSPITAL ENCOUNTER (OUTPATIENT)
Dept: WOUND CARE | Age: 84
Discharge: HOME | End: 2019-03-14
Payer: MEDICARE

## 2019-03-14 VITALS
SYSTOLIC BLOOD PRESSURE: 128 MMHG | RESPIRATION RATE: 20 BRPM | TEMPERATURE: 98.4 F | HEART RATE: 78 BPM | DIASTOLIC BLOOD PRESSURE: 56 MMHG

## 2019-03-14 DIAGNOSIS — L97.922 TRAUMATIC ULCER OF LEFT LOWER LEG WITH FAT LAYER EXPOSED (HCC): Primary | ICD-10-CM

## 2019-03-14 DIAGNOSIS — I87.313 CHRONIC VENOUS HYPERTENSION (IDIOPATHIC) WITH ULCER OF BILATERAL LOWER EXTREMITY (CODE) (HCC): ICD-10-CM

## 2019-03-14 PROCEDURE — 11042 DBRDMT SUBQ TIS 1ST 20SQCM/<: CPT

## 2019-03-14 RX ORDER — LIDOCAINE HYDROCHLORIDE 40 MG/ML
SOLUTION TOPICAL ONCE
Status: DISCONTINUED | OUTPATIENT
Start: 2019-03-14 | End: 2019-03-15 | Stop reason: HOSPADM

## 2019-03-14 ASSESSMENT — PAIN DESCRIPTION - DESCRIPTORS: DESCRIPTORS: THROBBING

## 2019-03-14 ASSESSMENT — PAIN - FUNCTIONAL ASSESSMENT: PAIN_FUNCTIONAL_ASSESSMENT: ACTIVITIES ARE NOT PREVENTED

## 2019-03-14 ASSESSMENT — PAIN DESCRIPTION - ORIENTATION: ORIENTATION: LEFT

## 2019-03-14 ASSESSMENT — PAIN DESCRIPTION - PROGRESSION: CLINICAL_PROGRESSION: NOT CHANGED

## 2019-03-14 ASSESSMENT — PAIN DESCRIPTION - ONSET: ONSET: ON-GOING

## 2019-03-14 ASSESSMENT — PAIN DESCRIPTION - LOCATION: LOCATION: LEG

## 2019-03-14 ASSESSMENT — PAIN DESCRIPTION - PAIN TYPE: TYPE: CHRONIC PAIN

## 2019-03-14 ASSESSMENT — PAIN DESCRIPTION - FREQUENCY: FREQUENCY: INTERMITTENT

## 2019-03-14 ASSESSMENT — PAIN SCALES - GENERAL: PAINLEVEL_OUTOF10: 3

## 2019-03-21 ENCOUNTER — HOSPITAL ENCOUNTER (OUTPATIENT)
Dept: WOUND CARE | Age: 84
Discharge: HOME | End: 2019-03-21
Payer: MEDICARE

## 2019-03-21 VITALS
SYSTOLIC BLOOD PRESSURE: 149 MMHG | HEART RATE: 73 BPM | TEMPERATURE: 98.3 F | RESPIRATION RATE: 18 BRPM | DIASTOLIC BLOOD PRESSURE: 54 MMHG

## 2019-03-21 DIAGNOSIS — L97.922 TRAUMATIC ULCER OF LEFT LOWER LEG WITH FAT LAYER EXPOSED (HCC): Primary | ICD-10-CM

## 2019-03-21 DIAGNOSIS — I87.313 CHRONIC VENOUS HYPERTENSION (IDIOPATHIC) WITH ULCER OF BILATERAL LOWER EXTREMITY (CODE) (HCC): ICD-10-CM

## 2019-03-21 PROCEDURE — 11042 DBRDMT SUBQ TIS 1ST 20SQCM/<: CPT

## 2019-03-21 PROCEDURE — 11042 DBRDMT SUBQ TIS 1ST 20SQCM/<: CPT | Performed by: NURSE PRACTITIONER

## 2019-03-21 ASSESSMENT — PAIN SCALES - GENERAL: PAINLEVEL_OUTOF10: 0

## 2019-03-21 ASSESSMENT — PAIN SCALES - WONG BAKER: WONGBAKER_NUMERICALRESPONSE: 0

## 2019-03-26 ENCOUNTER — TELEPHONE (OUTPATIENT)
Dept: INTERNAL MEDICINE CLINIC | Age: 84
End: 2019-03-26

## 2019-03-27 ENCOUNTER — ANTI-COAG VISIT (OUTPATIENT)
Dept: INTERNAL MEDICINE CLINIC | Age: 84
End: 2019-03-27

## 2019-03-28 ENCOUNTER — HOSPITAL ENCOUNTER (OUTPATIENT)
Dept: WOUND CARE | Age: 84
Discharge: HOME | End: 2019-03-28
Payer: MEDICARE

## 2019-03-28 VITALS
TEMPERATURE: 98 F | HEART RATE: 83 BPM | RESPIRATION RATE: 18 BRPM | DIASTOLIC BLOOD PRESSURE: 70 MMHG | SYSTOLIC BLOOD PRESSURE: 144 MMHG

## 2019-03-28 DIAGNOSIS — I87.313 CHRONIC VENOUS HYPERTENSION (IDIOPATHIC) WITH ULCER OF BILATERAL LOWER EXTREMITY (CODE) (HCC): ICD-10-CM

## 2019-03-28 DIAGNOSIS — L97.922 TRAUMATIC ULCER OF LEFT LOWER LEG WITH FAT LAYER EXPOSED (HCC): Primary | ICD-10-CM

## 2019-03-28 PROCEDURE — 11042 DBRDMT SUBQ TIS 1ST 20SQCM/<: CPT | Performed by: NURSE PRACTITIONER

## 2019-03-28 PROCEDURE — 11042 DBRDMT SUBQ TIS 1ST 20SQCM/<: CPT

## 2019-03-28 ASSESSMENT — PAIN SCALES - GENERAL: PAINLEVEL_OUTOF10: 0

## 2019-04-04 ENCOUNTER — HOSPITAL ENCOUNTER (OUTPATIENT)
Dept: WOUND CARE | Age: 84
Discharge: HOME | End: 2019-04-04
Payer: MEDICARE

## 2019-04-04 DIAGNOSIS — L97.922 TRAUMATIC ULCER OF LEFT LOWER LEG WITH FAT LAYER EXPOSED (HCC): ICD-10-CM

## 2019-04-04 DIAGNOSIS — I87.313 CHRONIC VENOUS HYPERTENSION (IDIOPATHIC) WITH ULCER OF BILATERAL LOWER EXTREMITY (CODE) (HCC): Primary | ICD-10-CM

## 2019-04-04 PROCEDURE — 11042 DBRDMT SUBQ TIS 1ST 20SQCM/<: CPT | Performed by: NURSE PRACTITIONER

## 2019-04-04 PROCEDURE — 11042 DBRDMT SUBQ TIS 1ST 20SQCM/<: CPT

## 2019-04-04 RX ORDER — LIDOCAINE HYDROCHLORIDE 40 MG/ML
SOLUTION TOPICAL ONCE
Status: DISCONTINUED | OUTPATIENT
Start: 2019-04-04 | End: 2019-04-05 | Stop reason: HOSPADM

## 2019-04-04 NOTE — PLAN OF CARE
Problem: Pain:  Goal: Pain level will decrease  Description  Pain level will decrease  Outcome: Ongoing  Goal: Control of acute pain  Description  Control of acute pain  Outcome: Ongoing  Goal: Control of chronic pain  Description  Control of chronic pain  Outcome: Ongoing     Problem: Wound:  Goal: Will show signs of wound healing; wound closure and no evidence of infection  Description  Will show signs of wound healing; wound closure and no evidence of infection   Outcome: Ongoing

## 2019-04-04 NOTE — PROGRESS NOTES
Wound Care Center Progress Note With Procedure    Teresa Rosario  AGE: 80 y.o. GENDER: female  : 1922  EPISODE DATE:  2019     Subjective:     Chief Complaint   Patient presents with    Wound Check     left leg         HISTORY of PRESENT ILLNESS      Teresa Rosario is a 80 y.o. female who presents today for wound evaluation of Chronic venous ulcer(s) of the left posterior lower leg. The ulcer is of moderate severity. The underlying cause of the wound is trauma.    Wound Pain Timing/Severity: mild  Quality of pain: tender  Severity of pain:  2  10   Modifying Factors: edema and venous stasis  Associated Signs/Symptoms: none        PAST MEDICAL HISTORY        Diagnosis Date    AAA (abdominal aortic aneurysm) ([address]     DISCUSSED W PT;/FAMILY 2019- NO FURTHER INTERVENTION PLANNED AS POOR CANDIDATE    Basosquamous carcinoma     L leg - Dr Marc Durant COPD (chronic obstructive pulmonary disease) ([address]     CVA (cerebral vascular accident) ([address]     L parietal w speech deficit  ~2000, sx resolved    Gastro-esophageal reflux disease with esophagitis     Gout     hand swelling and pain    Hyperlipidemia     Non-healing surgical wound     Osteoarthritis     Osteoarthritis, generalized     on chr pred started by Rheum    Peripheral vascular disease ([address] 2008, 2008    S/P PTA and stents X 2 to Right leg- Dr Austyn Barriga Thoracic aortic aneurysm ([address]     DISCUSSED W PT;/FAMILY 2019- NO FURTHER INTERVENTION PLANNED AS POOR CANDIDATE    Venous stasis ulcer ([address] 2012       PAST SURGICAL HISTORY    Past Surgical History:   Procedure Laterality Date    APPENDECTOMY      ARTERY SURGERY      STENT TO LEFT FEMORIAL AND ALSO TO RIGHT SUPRA FEMORIAL ARTERY    FRACTURE SURGERY      Left ???   -ORIF    HYSTERECTOMY, TOTAL ABDOMINAL  194    age 27-for fibroids    LEG SURGERY      2008-Right leg- PTA and Stent for PVD - Dr Joe Ball:   2008- Right leg -PTA and Stent- Dr Austyn Barriga LEG SURGERY  12    L basosquamous cell CA L leg    VASCULAR SURGERY         FAMILY HISTORY    Family History   Problem Relation Age of Onset    Cancer Mother     Early Death Mother     Hearing Loss Mother     Cancer Sister        SOCIAL HISTORY    Social History     Tobacco Use    Smoking status: Former Smoker     Packs/day: 0.00     Last attempt to quit: 2000     Years since quittin.2    Smokeless tobacco: Former User    Tobacco comment: Prior Hx of 1 Pack per week, none currently   Substance Use Topics    Alcohol use: Yes     Comment: pt states \"one drink a day\"    Drug use: No       ALLERGIES    Allergies   Allergen Reactions    Lisinopril      Cough      Sulfa Antibiotics     Pcn [Penicillins] Rash     Does tolerate keflex       MEDICATIONS    Current Outpatient Medications on File Prior to Encounter   Medication Sig Dispense Refill    hydrochlorothiazide (HYDRODIURIL) 12.5 MG tablet Take 1 tablet by mouth See Admin Instructions Take 1 tablet  48 tablet 3    ferrous sulfate 325 (65 Fe) MG EC tablet Take 1 tablet by mouth daily (with breakfast) 90 tablet 1    clopidogrel (PLAVIX) 75 MG tablet Take 1 tablet by mouth every other day 45 tablet 3    warfarin (COUMADIN) 2 MG tablet Take 1 tablet by mouth daily Take 2mg every day except 1mg on Sundays 90 tablet 1    losartan (COZAAR) 25 MG tablet Take 1 tablet by mouth daily 90 tablet 1    meclizine (TRAVEL SICKNESS) 25 MG CHEW Take 1 tablet by mouth every 6 hours as needed (dizziness/vertigo) 90 tablet 1    lovastatin (MEVACOR) 40 MG tablet Take 1 tablet by mouth 2 times daily TAKE ONE (1) TABLET BY MOUTH TWO TIMES DAILY jscript:void(0) 180 tablet 1    predniSONE (DELTASONE) 5 MG tablet Take 1 tablet by mouth daily 90 tablet 1    cyclobenzaprine (FLEXERIL) 5 MG tablet Take 1 tablet by mouth every evening 90 tablet 1    ranitidine (ZANTAC) 150 MG capsule Take 1 capsule by mouth 2 times daily 180 capsule 1    hydrALAZINE (APRESOLINE) 25 MG tablet Take 1 tablet by mouth 3 times daily 90 tablet 5    acetaminophen (TYLENOL) 325 MG tablet Take 2 tablets by mouth every 4 hours as needed for Pain 120 tablet 3     No current facility-administered medications on file prior to encounter. REVIEW OF SYSTEMS    Pertinent items are noted in HPI. Constitutional: Negative for systemic symptoms including fever, chills and malaise. Objective:      LMP  (LMP Unknown)     PHYSICAL EXAM    General: The patient is in no acute distress. Mental status:  Patient is appropriate, is  oriented to place and plan of care. Dermatologic exam: Visual inspection of the periwound reveals the skin to be normal in turgor and texture  Wound exam: see wound description below in procedure note      Assessment:     Problem List Items Addressed This Visit     WD-Chronic venous hypertension (idiopathic) with ulcer of bilateral lower extremity (CODE) ([facility] - Primary    WD-Traumatic ulcer of left lower leg with fat layer exposed ([facility]        Procedure Note    Indications:  Based on my examination of this patient's wound(s) today, sharp excision into necrotic subcutaneous tissue is required to promote healing and evaluate the extent of previous healing. Performed by: LES Corrales - CNP    Consent obtained: Yes    Time out taken:  Yes    Pain Control: Liquid lidocaine       Debridement:Excisional Debridement    Using curette the wound(s) was/were sharply debrided down through and including the removal of subcutaneous tissue.         Devitalized Tissue Debrided:  biofilm, slough and exudate    Pre Debridement Measurements:  Are located in the Wound Documentation Flow Sheet    All active wounds listed below with today's date are evaluated  Wound(s)    debrided this date include # : 12     Post  Debridement Measurements:  Wound 02/05/19 #12 LEFT POSTERIOR LEG (ONSET TWO DAYS AGO) VENOUS (Active)   Wound Image   4/4/2019  1:05 PM   Wound Venous 4/4/2019  1:05 PM   Dressing Status Clean;Dry; Intact 4/4/2019  1:27 PM   Dressing Changed Changed/New 4/4/2019  1:27 PM   Wound Cleansed Rinsed/Irrigated with saline 4/4/2019  1:05 PM   Wound Length (cm) 4.5 cm 4/4/2019  1:05 PM   Wound Width (cm) 2.5 cm 4/4/2019  1:05 PM   Wound Depth (cm) 0.1 cm 4/4/2019  1:05 PM   Wound Surface Area (cm^2) 11.25 cm^2 4/4/2019  1:05 PM   Change in Wound Size % (l*w) 67.3 4/4/2019  1:05 PM   Wound Volume (cm^3) 1.12 cm^3 4/4/2019  1:05 PM   Wound Healing % 84 4/4/2019  1:05 PM   Post-Procedure Length (cm) 4.5 cm 4/4/2019  1:19 PM   Post-Procedure Width (cm) 2.5 cm 4/4/2019  1:19 PM   Post-Procedure Depth (cm) 0.1 cm 4/4/2019  1:19 PM   Post-Procedure Surface Area (cm^2) 11.25 cm^2 4/4/2019  1:19 PM   Post-Procedure Volume (cm^3) 1.12 cm^3 4/4/2019  1:19 PM   Distance Tunneling (cm) 0 cm 4/4/2019  1:05 PM   Tunneling Position ___ O'Clock 0 4/4/2019  1:05 PM   Undermining Starts ___ O'Clock 0 4/4/2019  1:05 PM   Undermining Ends___ O'Clock 0 4/4/2019  1:05 PM   Undermining Maxium Distance (cm) 0 4/4/2019  1:05 PM   Wound Assessment Pink;Yellow 4/4/2019  1:05 PM   Drainage Amount Large 4/4/2019  1:05 PM   Drainage Description Serosanguinous 4/4/2019  1:05 PM   Odor None 4/4/2019  1:05 PM   Margins Defined edges 4/4/2019  1:05 PM   Supriya-wound Assessment Dark edges; Maceration 4/4/2019  1:05 PM   Non-staged Wound Description Full thickness 4/4/2019  1:05 PM   Judson%Wound Bed 50 4/4/2019  1:05 PM   Red%Wound Bed 0 4/4/2019  1:05 PM   Yellow%Wound Bed 50 4/4/2019  1:05 PM   Black%Wound Bed 0 4/4/2019  1:05 PM   Purple%Wound Bed 0 4/4/2019  1:05 PM   Other%Wound Bed 0 4/4/2019  1:05 PM   Culture Taken Yes 2/14/2019  1:31 PM   Number of days: 57       Percent of Wound(s) Debrided: approximately 100%    Total  Area  Debrided:  11.25 sq cm     Bleeding:  Minimal    Hemostasis Achieved:  by pressure    Procedural Pain:  3  / 10     Post Procedural Pain:  2 / 10     Response to treatment:  Well tolerated by patient. Status of wound progress and description from last visit:   Smaller in size. Plan:       Discharge Instructions         Plan:         Discharge Instructions        PHYSICIAN ORDERS AND DISCHARGE INSTRUCTIONS     FAX TO [facility]     NOTE: Upon discharge from the [address], you will receive a patient experience survey. We would be grateful if you would take the time to fill this survey out.     Wound care order history:                               Vascular studies: ARTERIAL STUDIES COMPLETE  Date 2/22/2018 AND 4/16/18-                                             VASCULAR  INTERVENTION SCHEDULED FOR 7/2/2018                                                     Imaging:   Date               Cultures:   Date 5/3/18 CULTURE OBTAINED; 6/14/18-wound culture postivie scanty growth MRSA-discussed with patient and son-doxycyline RX                                  2/04/52: Culture of left lower leg medial wound obtained      1/17/19 CULTURE OBTAINED    2/14/19 CULTURE OBTAINED              Labs/ HbA1c:   Date               Grafts: 8/30/2018: Puraply #1 Lot #-[id number]                                              Puraply #2 Lot #-[id number]. 1.1B                                              Puraply #3 Lot # [id number]. 1.1B                                              Puraply # 4 Lot #: [id number]. 1.1B                                              Puraply #5 (2x2) applied                                              Nushield # 1 (2x3) applied on 10/11/18                                               Nushield #2 (2x3) applied on 10/18/18                                              Affinity #1 (2.5x2.5) applied on 11/8/18                                              Affinity #2 (2.5x2.5) applied on 11/16/18                                              Affinity #3 (2.5 x 2.5) applied 11/29/18                  HBO:                 Antibiotics:  10/18/18 Doxycycline 100 mg PO BID x 10 days called to [facility];  1/7/19 CLINDAMYCIN ORDERED FROM PCP              Earlier Wound care treatments:  Santyl ordered              Authorizations:                        Consults: DR Macey Light care physician: DR Bryce Miles     Continuing wound care orders and information:              Residence: HOME              Continue home health care [address]               Your wound-care supplies will be provided by: [facility] provider: [address] [facility] with              NANI loading:  Date               APVMV Medications: RX BACTROBAN 2% 22 GRAM TUBE APPLY TO WOUND BED DAILY. REFILL 0 (CALLED INTO [facility])        Wound cleansing:                           BD not scrub or use excessive force.                          Wash hands with soap and water before and after dressing changes.                         Prior to applying a clean dressing, cleanse wound with normal saline,                                wound cleanser, or mild soap and water.                           Ask the physician or nurse before getting the wound(s) wet in a shower        Daily Wound management:                          Keep weight off wounds and reposition every 2 hours.                          Avoid standing for long periods of time.                          Apply wraps/stockings in AM and remove at bedtime.                          If swelling is present, elevate legs to the level of the heart or above for 30  minutes 4-5 times a day and/or when sitti   ng.                                                  When taking antibiotics take entire prescription as ordered by physician do not stop taking until medicine is all gone.                             Orders for this week:  4/4/2019     LEFT POSTERIOR LEG--  CLEANSE WITH NORMAL SALINE PAT DRY.  Apply mepitel to wound bed, then use damp hydra fera blue to wound bed, cover with abd, wrap with conform and tape .  Change every other day. ((make sure to moisten dressing with saline prior to removing))) . LOTION INTACT SKIN.  APPLY TUBIGRIP D IN THE AM AND OFF IN THE PM.       Follow up with Khoa Bravo NP in 1 week  in the wound care center.     Call [phone] for any questions or concerns.     Date__________   Time____________                             Treatment Note Wound 02/05/19 #12 LEFT POSTERIOR LEG (ONSET TWO DAYS AGO) VENOUS-Dressing/Treatment: (mepitel damp hydra fera blue abd conform tape lotion tubi D )    Written Patient Dismissal Instructions Given            Electronically signed by LES Pinon CNP on 4/4/2019 at 1:48 PM

## 2019-04-09 DIAGNOSIS — I10 ESSENTIAL HYPERTENSION: ICD-10-CM

## 2019-04-09 DIAGNOSIS — K21.00 GASTRO-ESOPHAGEAL REFLUX DISEASE WITH ESOPHAGITIS: ICD-10-CM

## 2019-04-09 RX ORDER — LOSARTAN POTASSIUM 25 MG/1
25 TABLET ORAL DAILY
Qty: 90 TABLET | Refills: 1 | Status: SHIPPED | OUTPATIENT
Start: 2019-04-09 | End: 2019-07-01 | Stop reason: SDUPTHER

## 2019-04-09 RX ORDER — RANITIDINE 150 MG/1
150 CAPSULE ORAL 2 TIMES DAILY
Qty: 180 CAPSULE | Refills: 1 | Status: SHIPPED | OUTPATIENT
Start: 2019-04-09 | End: 2019-05-10 | Stop reason: SDUPTHER

## 2019-04-11 ENCOUNTER — HOSPITAL ENCOUNTER (OUTPATIENT)
Dept: WOUND CARE | Age: 84
Discharge: HOME | End: 2019-04-11
Payer: MEDICARE

## 2019-04-11 VITALS
SYSTOLIC BLOOD PRESSURE: 120 MMHG | RESPIRATION RATE: 16 BRPM | DIASTOLIC BLOOD PRESSURE: 57 MMHG | HEART RATE: 86 BPM | TEMPERATURE: 98.1 F

## 2019-04-11 DIAGNOSIS — I87.313 CHRONIC VENOUS HYPERTENSION (IDIOPATHIC) WITH ULCER OF BILATERAL LOWER EXTREMITY (CODE) (HCC): ICD-10-CM

## 2019-04-11 DIAGNOSIS — L97.922 TRAUMATIC ULCER OF LEFT LOWER LEG WITH FAT LAYER EXPOSED (HCC): Primary | ICD-10-CM

## 2019-04-11 PROCEDURE — 11042 DBRDMT SUBQ TIS 1ST 20SQCM/<: CPT | Performed by: NURSE PRACTITIONER

## 2019-04-11 PROCEDURE — 11042 DBRDMT SUBQ TIS 1ST 20SQCM/<: CPT

## 2019-04-11 NOTE — PROGRESS NOTES
tablet 1    hydrALAZINE (APRESOLINE) 25 MG tablet Take 1 tablet by mouth 3 times daily 90 tablet 5    acetaminophen (TYLENOL) 325 MG tablet Take 2 tablets by mouth every 4 hours as needed for Pain 120 tablet 3     No current facility-administered medications on file prior to encounter. REVIEW OF SYSTEMS    Pertinent items are noted in HPI. Constitutional: Negative for systemic symptoms including fever, chills and malaise. Objective:      BP (!) 120/57   Pulse 86   Temp 98.1 °F (36.7 °C) (Temporal)   Resp 16   LMP  (LMP Unknown)     PHYSICAL EXAM    General: The patient is in no acute distress. Mental status:  Patient is appropriate, is  oriented to place and plan of care. Dermatologic exam: Visual inspection of the periwound reveals the skin to be edematous  Wound exam: see wound description below in procedure note      Assessment:     Problem List Items Addressed This Visit     WD-Chronic venous hypertension (idiopathic) with ulcer of bilateral lower extremity (CODE) ([address]    WD-Traumatic ulcer of left lower leg with fat layer exposed ([address] - Primary        Procedure Note    Indications:  Based on my examination of this patient's wound(s) today, sharp excision into necrotic subcutaneous tissue is required to promote healing and evaluate the extent of previous healing. Performed by: LES Bradford - CNP    Consent obtained: Yes    Time out taken:  Yes    Pain Control: Anesthetic  Anesthetic: 4% Lidocaine Liquid Topical(10 cc)     Debridement:Excisional Debridement    Using #15 blade scalpel the wound(s) was/were sharply debrided down through and including the removal of subcutaneous tissue.         Devitalized Tissue Debrided:  fibrin, biofilm and exudate    Pre Debridement Measurements:  Are located in the Wound Documentation Flow Sheet    All active wounds listed below with today's date are evaluated  Wound(s)    debrided this date include # : 12     Post  Debridement Bleeding:  Minimal    Hemostasis Achieved:  by pressure    Procedural Pain:  3  / 10     Post Procedural Pain:  2 / 10     Response to treatment:  Well tolerated by patient. Status of wound progress and description from last visit:   improving. Plan:       Discharge Instructions           Plan:         Discharge Instructions        PHYSICIAN ORDERS AND DISCHARGE INSTRUCTIONS     FAX TO [facility]     NOTE: Upon discharge from the [address], you will receive a patient experience survey. We would be grateful if you would take the time to fill this survey out.     Wound care order history:                               Vascular studies: ARTERIAL STUDIES COMPLETE  Date 2/22/2018 AND 4/16/18-                                             VASCULAR  INTERVENTION SCHEDULED FOR 7/2/2018                                                     Imaging:   Date               Cultures:   Date 5/3/18 CULTURE OBTAINED; 6/14/18-wound culture postivie scanty growth MRSA-discussed with patient and son-doxycyline RX                                  0/29/09: Culture of left lower leg medial wound obtained      1/17/19 CULTURE OBTAINED    2/14/19 CULTURE OBTAINED              Labs/ HbA1c:   Date               Grafts: 8/30/2018: Puraply #1 Lot #-[id number]                                              Puraply #2 Lot #-[id number]. 1.1B                                              Puraply #3 Lot # [id number]. 1.1B                                              Puraply # 4 Lot #: [id number]. 1.1B                                              Puraply #5 (2x2) applied                                              Nushield # 1 (2x3) applied on 10/11/18                                               Nushield #2 (2x3) applied on 10/18/18                                              Affinity #1 (2.5x2.5) applied on 11/8/18                                              Affinity #2 (2.5x2.5) applied on 11/16/18                                              Affinity #3 (2.5 x 2.5) applied 11/29/18                  HBO:                 Antibiotics:  10/18/18 Doxycycline 100 mg PO BID x 10 days called to [facility];  1/7/19 CLINDAMYCIN ORDERED FROM PCP              Earlier Wound care treatments:  Santyl ordered              Authorizations:                        Consults: DR Delvin Nguyen care physician: DR Kristin Toney     Continuing wound care orders and information:              Residence: HOME              Continue home health care [address]               Your wound-care supplies will be provided by: [facility] [place] [address] [facility] with  Eduarda Yeager loading:  Date               KIWWN Medications: RX BACTROBAN 2% 22 GRAM TUBE APPLY TO WOUND BED DAILY. REFILL 0 (CALLED INTO [facility])        Wound cleansing:                           FA not scrub or use excessive force.                          Wash hands with soap and water before and after dressing changes.                           Prior to applying a clean dressing, cleanse wound with normal saline,                                wound cleanser, or mild soap and water.                           Ask the physician or nurse before getting the wound(s) wet in a shower        Daily Wound management:                          Keep weight off wounds and reposition every 2 hours.                          Avoid standing for long periods of time.                          LWDSR wraps/stockings in AM and remove at bedtime.                          If swelling is present, elevate legs to the level of the heart or above for 30  minutes 4-5 times a day and/or when sitti   ng.                                                  When taking antibiotics take entire prescription as ordered by physician do not stop taking until medicine is all gone.                             Orders for this week:  4/11/2019     LEFT POSTERIOR LEG--  CLEANSE WITH NORMAL SALINE PAT DRY.  Apply mepitel to wound bed, then use damp hydra fera blue to wound bed, cover with abd, wrap with conform and tape . Change every other day. ((make sure to moisten dressing with saline prior to removing))) . LOTION INTACT SKIN.  APPLY TUBIGRIP D IN THE AM AND OFF IN THE PM.       Follow up with Roman Abdi NP in 1 week  in the wound care center.     Call [phone] for any questions or concerns.     Date__________   Time____________                                            Treatment Note Wound 02/05/19 #12 LEFT POSTERIOR LEG (ONSET TWO DAYS AGO) VENOUS-Dressing/Treatment: (mepitel damp hydrafera blue abd conform tape lotion tubi D )    Written Patient Dismissal Instructions Given            Electronically signed by LSE Del Angel CNP on 4/11/2019 at 3:38 PM

## 2019-04-18 ENCOUNTER — HOSPITAL ENCOUNTER (OUTPATIENT)
Dept: WOUND CARE | Age: 84
Discharge: HOME | End: 2019-04-18
Payer: MEDICARE

## 2019-04-18 VITALS
HEART RATE: 81 BPM | TEMPERATURE: 98.1 F | RESPIRATION RATE: 20 BRPM | DIASTOLIC BLOOD PRESSURE: 60 MMHG | SYSTOLIC BLOOD PRESSURE: 129 MMHG

## 2019-04-18 DIAGNOSIS — I87.313 CHRONIC VENOUS HYPERTENSION (IDIOPATHIC) WITH ULCER OF BILATERAL LOWER EXTREMITY (CODE) (HCC): Primary | ICD-10-CM

## 2019-04-18 DIAGNOSIS — L97.922 TRAUMATIC ULCER OF LEFT LOWER LEG WITH FAT LAYER EXPOSED (HCC): ICD-10-CM

## 2019-04-18 PROCEDURE — 11042 DBRDMT SUBQ TIS 1ST 20SQCM/<: CPT

## 2019-04-18 PROCEDURE — 11042 DBRDMT SUBQ TIS 1ST 20SQCM/<: CPT | Performed by: NURSE PRACTITIONER

## 2019-04-18 RX ORDER — LIDOCAINE HYDROCHLORIDE 40 MG/ML
SOLUTION TOPICAL ONCE
Status: DISCONTINUED | OUTPATIENT
Start: 2019-04-18 | End: 2019-04-19 | Stop reason: HOSPADM

## 2019-04-18 NOTE — PROGRESS NOTES
Wound Care Center Progress Note With Procedure    Fatoumata Castelan  AGE: 80 y.o. GENDER: female  : 1922  EPISODE DATE:  2019     Subjective:     Chief Complaint   Patient presents with    Wound Check     left leg          HISTORY of PRESENT ILLNESS      Fatoumata Castelan is a 80 y.o. female who presents today for wound evaluation of Chronic venous ulcer(s) of the left posterior lower leg. The ulcer is of moderate severity. The underlying cause of the wound is trauma.    Wound Pain Timing/Severity: none  Quality of pain: N/A  Severity of pain:  0 / 10   Modifying Factors: edema and venous stasis  Associated Signs/Symptoms: none        PAST MEDICAL HISTORY        Diagnosis Date    AAA (abdominal aortic aneurysm) ([address]     DISCUSSED W PT;/FAMILY 2019- NO FURTHER INTERVENTION PLANNED AS POOR CANDIDATE    Basosquamous carcinoma     L leg - Dr Jerson Gao COPD (chronic obstructive pulmonary disease) ([address]     CVA (cerebral vascular accident) ([address]     L parietal w speech deficit  ~, sx resolved    Gastro-esophageal reflux disease with esophagitis     Gout     hand swelling and pain    Hyperlipidemia     Non-healing surgical wound     Osteoarthritis     Osteoarthritis, generalized     on chr pred started by Rheum    Peripheral vascular disease ([address] 2008, 2008    S/P PTA and stents X 2 to Right leg- Dr Annabel Akins Thoracic aortic aneurysm ([address]     DISCUSSED W PT;/FAMILY 2019- NO FURTHER INTERVENTION PLANNED AS POOR CANDIDATE    Venous stasis ulcer ([address] 2012       PAST SURGICAL HISTORY    Past Surgical History:   Procedure Laterality Date    APPENDECTOMY      ARTERY SURGERY      STENT TO LEFT FEMORIAL AND ALSO TO RIGHT SUPRA FEMORIAL ARTERY    FRACTURE SURGERY      Left ???   -ORIF    HYSTERECTOMY, TOTAL ABDOMINAL  194    age 27-for fibroids    LEG SURGERY      2008-Right leg- PTA and Stent for PVD - Dr Joss Payne:   2008- Right leg -PTA and Stent- Dr Annabel Akins LEG SURGERY  12    L basosquamous cell CA L leg    VASCULAR SURGERY         FAMILY HISTORY    Family History   Problem Relation Age of Onset    Cancer Mother     Early Death Mother     Hearing Loss Mother     Cancer Sister        SOCIAL HISTORY    Social History     Tobacco Use    Smoking status: Former Smoker     Packs/day: 0.00     Last attempt to quit: 2000     Years since quittin.2    Smokeless tobacco: Former User    Tobacco comment: Prior Hx of 1 Pack per week, none currently   Substance Use Topics    Alcohol use: Yes     Comment: pt states \"one drink a day\"    Drug use: No       ALLERGIES    Allergies   Allergen Reactions    Lisinopril      Cough      Sulfa Antibiotics     Pcn [Penicillins] Rash     Does tolerate keflex       MEDICATIONS    Current Outpatient Medications on File Prior to Encounter   Medication Sig Dispense Refill    ranitidine (ZANTAC) 150 MG capsule Take 1 capsule by mouth 2 times daily 180 capsule 1    losartan (COZAAR) 25 MG tablet Take 1 tablet by mouth daily 90 tablet 1    hydrochlorothiazide (HYDRODIURIL) 12.5 MG tablet Take 1 tablet by mouth See Admin Instructions Take 1 tablet  48 tablet 3    ferrous sulfate 325 (65 Fe) MG EC tablet Take 1 tablet by mouth daily (with breakfast) 90 tablet 1    clopidogrel (PLAVIX) 75 MG tablet Take 1 tablet by mouth every other day 45 tablet 3    warfarin (COUMADIN) 2 MG tablet Take 1 tablet by mouth daily Take 2mg every day except 1mg on Sundays 90 tablet 1    meclizine (TRAVEL SICKNESS) 25 MG CHEW Take 1 tablet by mouth every 6 hours as needed (dizziness/vertigo) 90 tablet 1    lovastatin (MEVACOR) 40 MG tablet Take 1 tablet by mouth 2 times daily TAKE ONE (1) TABLET BY MOUTH TWO TIMES DAILY jscript:void(0) 180 tablet 1    predniSONE (DELTASONE) 5 MG tablet Take 1 tablet by mouth daily 90 tablet 1    cyclobenzaprine (FLEXERIL) 5 MG tablet Take 1 tablet by mouth every evening 90 tablet 1    hydrALAZINE (APRESOLINE) 25 MG tablet Take 1 tablet by mouth 3 times daily 90 tablet 5    acetaminophen (TYLENOL) 325 MG tablet Take 2 tablets by mouth every 4 hours as needed for Pain 120 tablet 3     No current facility-administered medications on file prior to encounter. REVIEW OF SYSTEMS    Pertinent items are noted in HPI. Constitutional: Negative for systemic symptoms including fever, chills and malaise. Objective:      /60   Pulse 81   Temp 98.1 °F (36.7 °C) (Temporal)   Resp 20   LMP  (LMP Unknown)     PHYSICAL EXAM    General: The patient is in no acute distress. Mental status:  Patient is appropriate, is  oriented to place and plan of care. Dermatologic exam: Visual inspection of the periwound reveals the skin to be edematous  Wound exam: see wound description below in procedure note      Assessment:     Problem List Items Addressed This Visit     WD-Chronic venous hypertension (idiopathic) with ulcer of bilateral lower extremity (CODE) ([facility] - Primary    WD-Traumatic ulcer of left lower leg with fat layer exposed ([facility]        Procedure Note    Indications:  Based on my examination of this patient's wound(s) today, sharp excision into necrotic subcutaneous tissue is required to promote healing and evaluate the extent of previous healing. Performed by: LES Mon - CNP    Consent obtained: Yes    Time out taken:  Yes    Pain Control: Not needed       Debridement:Excisional Debridement    Using #15 blade scalpel the wound(s) was/were sharply debrided down through and including the removal of subcutaneous tissue.         Devitalized Tissue Debrided:  fibrin and biofilm    Pre Debridement Measurements:  Are located in the Wound Documentation Flow Sheet    All active wounds listed below with today's date are evaluated  Wound(s)    debrided this date include # : 12     Post  Debridement Measurements:  Wound 02/05/19 #12 LEFT POSTERIOR LEG (ONSET TWO DAYS AGO) VENOUS (Active) Wound Image   4/18/2019  1:00 PM   Wound Venous 4/18/2019  1:00 PM   Dressing Status Clean;Dry; Intact 4/18/2019  1:58 PM   Dressing Changed Changed/New 4/18/2019  1:58 PM   Wound Cleansed Rinsed/Irrigated with saline 4/18/2019  1:00 PM   Wound Length (cm) 4.5 cm 4/18/2019  1:00 PM   Wound Width (cm) 2.3 cm 4/18/2019  1:00 PM   Wound Depth (cm) 0.1 cm 4/18/2019  1:00 PM   Wound Surface Area (cm^2) 10.35 cm^2 4/18/2019  1:00 PM   Change in Wound Size % (l*w) 69.91 4/18/2019  1:00 PM   Wound Volume (cm^3) 1.03 cm^3 4/18/2019  1:00 PM   Wound Healing % 85 4/18/2019  1:00 PM   Post-Procedure Length (cm) 4.5 cm 4/18/2019  1:24 PM   Post-Procedure Width (cm) 2.3 cm 4/18/2019  1:24 PM   Post-Procedure Depth (cm) 0.1 cm 4/18/2019  1:24 PM   Post-Procedure Surface Area (cm^2) 10.35 cm^2 4/18/2019  1:24 PM   Post-Procedure Volume (cm^3) 1.03 cm^3 4/18/2019  1:24 PM   Distance Tunneling (cm) 0 cm 4/18/2019  1:00 PM   Tunneling Position ___ O'Clock 0 4/18/2019  1:00 PM   Undermining Starts ___ O'Clock 0 4/18/2019  1:00 PM   Undermining Ends___ O'Clock 0 4/18/2019  1:00 PM   Undermining Maxium Distance (cm) 0 4/18/2019  1:00 PM   Wound Assessment Red 4/18/2019  1:00 PM   Drainage Amount Moderate 4/18/2019  1:00 PM   Drainage Description Serosanguinous 4/18/2019  1:00 PM   Odor None 4/18/2019  1:00 PM   Margins Defined edges 4/18/2019  1:00 PM   Supriya-wound Assessment Dry 4/18/2019  1:00 PM   Non-staged Wound Description Full thickness 4/18/2019  1:00 PM   [place]%Wound Bed 0 4/18/2019  1:00 PM   Red%Wound Bed 100 4/18/2019  1:00 PM   Yellow%Wound Bed 0 4/18/2019  1:00 PM   Black%Wound Bed 0 4/18/2019  1:00 PM   Purple%Wound Bed 0 4/18/2019  1:00 PM   Other%Wound Bed 0 4/18/2019  1:00 PM   Culture Taken Yes 2/14/2019  1:31 PM   Number of days: 72       Percent of Wound(s) Debrided: approximately 100%    Total  Area  Debrided:  10.35 sq cm     Bleeding:  Minimal    Hemostasis Achieved:  by pressure    Procedural Pain:  0  / 10 bed, cover with abd, wrap with conform and tape . Change every other day. ((make sure to moisten dressing with saline prior to removing))) . LOTION INTACT SKIN.  APPLY TUBIGRIP D IN THE AM AND OFF IN THE PM.       Follow up with Erik Purdy NP in 1 week  in the wound care center.     Call [phone] for any questions or concerns.     Date__________   Time____________                          Treatment Note Wound 02/05/19 #12 LEFT POSTERIOR LEG (ONSET TWO DAYS AGO) VENOUS-Dressing/Treatment: (mepitel damp hydrafera blue abd conform tape lotion tubi D )    Written Patient Dismissal Instructions Given            Electronically signed by LES Montalvo CNP on 4/18/2019 at 3:28 PM

## 2019-04-22 ENCOUNTER — TELEPHONE (OUTPATIENT)
Dept: INTERNAL MEDICINE CLINIC | Age: 84
End: 2019-04-22

## 2019-04-22 NOTE — TELEPHONE ENCOUNTER
Crystal, please confirm w Iesha/family is on 2mg coumadin daily. If so, then rec take 3mg for 2d then back to 2mg daily. Recheck INR one month.

## 2019-05-01 ENCOUNTER — ANTI-COAG VISIT (OUTPATIENT)
Dept: INTERNAL MEDICINE CLINIC | Age: 84
End: 2019-05-01

## 2019-05-02 ENCOUNTER — HOSPITAL ENCOUNTER (OUTPATIENT)
Dept: WOUND CARE | Age: 84
Discharge: HOME | End: 2019-05-02
Payer: MEDICARE

## 2019-05-02 VITALS
DIASTOLIC BLOOD PRESSURE: 64 MMHG | RESPIRATION RATE: 18 BRPM | SYSTOLIC BLOOD PRESSURE: 132 MMHG | TEMPERATURE: 98.3 F | HEART RATE: 74 BPM

## 2019-05-02 DIAGNOSIS — L97.912 TRAUMATIC ULCER OF RIGHT LOWER EXTREMITY WITH FAT LAYER EXPOSED (HCC): ICD-10-CM

## 2019-05-02 DIAGNOSIS — L97.922 TRAUMATIC ULCER OF LEFT LOWER LEG WITH FAT LAYER EXPOSED (HCC): Primary | ICD-10-CM

## 2019-05-02 PROCEDURE — 11042 DBRDMT SUBQ TIS 1ST 20SQCM/<: CPT

## 2019-05-02 PROCEDURE — 11042 DBRDMT SUBQ TIS 1ST 20SQCM/<: CPT | Performed by: NURSE PRACTITIONER

## 2019-05-02 ASSESSMENT — PAIN SCALES - GENERAL: PAINLEVEL_OUTOF10: 0

## 2019-05-02 NOTE — PROGRESS NOTES
Wound Care Center Progress Note With Procedure    Maria Isabel Omer  AGE: 80 y.o. GENDER: female  : 1922  EPISODE DATE:  2019     Subjective:     Chief Complaint   Patient presents with    Wound Check     Left Leg         HISTORY of PRESENT ILLNESS      Maria Isabel Omer is a 80 y.o. female who presents today for wound evaluation of Chronic venous and traumatic ulcer(s) of the bilateral lower legs. The ulcer is of moderate severity. The underlying cause of the wound is trauma. She has a new area to the right leg that she hit against the vacuum.    Wound Pain Timing/Severity: intermittent  Quality of pain: tender  Severity of pain:  1 / 10   Modifying Factors: edema and venous stasis  Associated Signs/Symptoms: none        PAST MEDICAL HISTORY        Diagnosis Date    AAA (abdominal aortic aneurysm) ([address]     DISCUSSED W PT;/FAMILY 2019- NO FURTHER INTERVENTION PLANNED AS POOR CANDIDATE    Basosquamous carcinoma     L leg - Dr Felizardo Kanner COPD (chronic obstructive pulmonary disease) ([address]     CVA (cerebral vascular accident) ([address]     L parietal w speech deficit  ~2000, sx resolved    Gastro-esophageal reflux disease with esophagitis     Gout     hand swelling and pain    Hyperlipidemia     Non-healing surgical wound     Osteoarthritis     Osteoarthritis, generalized     on chr pred started by Rheum    Peripheral vascular disease ([address] 2008, 2008    S/P PTA and stents X 2 to Right leg- Dr Todd Weston Thoracic aortic aneurysm ([address]     DISCUSSED W PT;/FAMILY 2019- NO FURTHER INTERVENTION PLANNED AS POOR CANDIDATE    Venous stasis ulcer ([address] 2012       PAST SURGICAL HISTORY    Past Surgical History:   Procedure Laterality Date    APPENDECTOMY      ARTERY SURGERY      STENT TO LEFT FEMORIAL AND ALSO TO RIGHT SUPRA FEMORIAL ARTERY    FRACTURE SURGERY      Left ???   -ORIF    HYSTERECTOMY, TOTAL ABDOMINAL  1949    age 27-for fibroids    LEG SURGERY      2008-Right leg- PTA and Stent for PVD - Dr Joss Payne:   2008- Right leg -PTA and Stent- Dr Nicklas Bence  12    L basosquamous cell CA L leg    VASCULAR SURGERY         FAMILY HISTORY    Family History   Problem Relation Age of Onset    Cancer Mother     Early Death Mother     Hearing Loss Mother     Cancer Sister        SOCIAL HISTORY    Social History     Tobacco Use    Smoking status: Former Smoker     Packs/day: 0.00     Last attempt to quit: 2000     Years since quittin.2    Smokeless tobacco: Former User    Tobacco comment: Prior Hx of 1 Pack per week, none currently   Substance Use Topics    Alcohol use: Yes     Comment: pt states \"one drink a day\"    Drug use: No       ALLERGIES    Allergies   Allergen Reactions    Lisinopril      Cough      Sulfa Antibiotics     Pcn [Penicillins] Rash     Does tolerate keflex       MEDICATIONS    Current Outpatient Medications on File Prior to Encounter   Medication Sig Dispense Refill    ranitidine (ZANTAC) 150 MG capsule Take 1 capsule by mouth 2 times daily 180 capsule 1    losartan (COZAAR) 25 MG tablet Take 1 tablet by mouth daily 90 tablet 1    hydrochlorothiazide (HYDRODIURIL) 12.5 MG tablet Take 1 tablet by mouth See Admin Instructions Take 1 tablet  48 tablet 3    ferrous sulfate 325 (65 Fe) MG EC tablet Take 1 tablet by mouth daily (with breakfast) 90 tablet 1    warfarin (COUMADIN) 2 MG tablet Take 1 tablet by mouth daily Take 2mg every day except 1mg on Sundays 90 tablet 1    lovastatin (MEVACOR) 40 MG tablet Take 1 tablet by mouth 2 times daily TAKE ONE (1) TABLET BY MOUTH TWO TIMES DAILY jscript:void(0) 180 tablet 1    predniSONE (DELTASONE) 5 MG tablet Take 1 tablet by mouth daily 90 tablet 1    cyclobenzaprine (FLEXERIL) 5 MG tablet Take 1 tablet by mouth every evening 90 tablet 1    hydrALAZINE (APRESOLINE) 25 MG tablet Take 1 tablet by mouth 3 times daily 90 tablet 5    clopidogrel (PLAVIX) 75 MG tablet Take 1 PM   Wound Traumatic 5/2/2019  1:16 PM   Dressing Status Clean;Dry; Intact 5/2/2019  1:39 PM   Dressing Changed Changed/New 5/2/2019  1:39 PM   Wound Cleansed Rinsed/Irrigated with saline 5/2/2019  1:16 PM   Wound Length (cm) 1.7 cm 5/2/2019  1:16 PM   Wound Width (cm) 0.6 cm 5/2/2019  1:16 PM   Wound Depth (cm) 0.1 cm 5/2/2019  1:16 PM   Wound Surface Area (cm^2) 1.02 cm^2 5/2/2019  1:16 PM   Wound Volume (cm^3) 0.1 cm^3 5/2/2019  1:16 PM   Distance Tunneling (cm) 0 cm 5/2/2019  1:16 PM   Tunneling Position ___ O'Clock 0 5/2/2019  1:16 PM   Undermining Starts ___ O'Clock 0 5/2/2019  1:16 PM   Undermining Ends___ O'Clock 0 5/2/2019  1:16 PM   Undermining Maxium Distance (cm) 0 5/2/2019  1:16 PM   Wound Assessment Red;Yellow 5/2/2019  1:16 PM   Drainage Amount Moderate 5/2/2019  1:16 PM   Drainage Description Serosanguinous 5/2/2019  1:16 PM   Odor None 5/2/2019  1:16 PM   Margins Defined edges 5/2/2019  1:16 PM   Supriya-wound Assessment Fragile 5/2/2019  1:16 PM   Non-staged Wound Description Full thickness 5/2/2019  1:16 PM   [place]%Wound Bed 0 5/2/2019  1:16 PM   Red%Wound Bed 60 5/2/2019  1:16 PM   Yellow%Wound Bed 40 5/2/2019  1:16 PM   Black%Wound Bed 0 5/2/2019  1:16 PM   Purple%Wound Bed 0 5/2/2019  1:16 PM   Other%Wound Bed 0 5/2/2019  1:16 PM   Number of days: 0       Percent of Wound(s) Debrided: approximately 100%    Total  Area  Debrided:  5.46 sq cm     Bleeding:  Minimal    Hemostasis Achieved:  by pressure    Procedural Pain:  0  / 10     Post Procedural Pain:  0 / 10     Response to treatment:  Well tolerated by patient. Status of wound progress and description from last visit:   Left leg is improving, new wound to the right leg. Plan:       Discharge Instructions            Plan:         Discharge Instructions        PHYSICIAN ORDERS AND DISCHARGE INSTRUCTIONS     FAX TO [facility]     NOTE: Upon discharge from the [address], you will receive a patient experience survey.  We would be grateful if you would take the time to fill this survey out.     Wound care order history:                               Vascular studies: ARTERIAL STUDIES COMPLETE  Date 2/22/2018 AND 4/16/18-                                             VASCULAR  INTERVENTION SCHEDULED FOR 7/2/2018                                                     Imaging:   Date               Cultures:   Date 5/3/18 CULTURE OBTAINED; 6/14/18-wound culture postivie scanty growth MRSA-discussed with patient and son-doxycyline RX                                  2/65/62: Culture of left lower leg medial wound obtained      1/17/19 CULTURE OBTAINED    2/14/19 CULTURE OBTAINED              Labs/ HbA1c:   Date               Grafts: 8/30/2018: Puraply #1 Lot #-[id number]                                              Puraply #2 Lot #-[id number]. 1.1B                                              Puraply #3 Lot # [id number]. 1.1B                                              Puraply # 4 Lot #: [id number]. 1.1B                                              Puraply #5 (2x2) applied                                              [place]ield # 1 (2x3) applied on 10/11/18                                               [place] #2 (2x3) applied on 10/18/18                                              Affinity #1 (2.5x2.5) applied on 11/8/18                                              Affinity #2 (2.5x2.5) applied on 11/16/18                                              Affinity #3 (2.5 x 2.5) applied 11/29/18                  HBO:                 Antibiotics:  10/18/18 Doxycycline 100 mg PO BID x 10 days called to [facility];  1/7/19 CLINDAMYCIN ORDERED FROM PCP              Earlier Wound care treatments:  Santyl ordered              Authorizations:                        Consults: DR Suleman Khalil care physician: DR Jose Pavon     Continuing wound care orders and information:              Residence: HOME              [facility] [address] [facility] [address]               Your wound-care supplies will be provided by: [address]              DME provider: [address] [facility] with              RFW loading:  Date               GDYPK Medications: RX BACTROBAN 2% 22 GRAM TUBE APPLY TO WOUND BED DAILY. REFILL 0 (CALLED INTO [facility])        Wound cleansing:                           SJ not scrub or use excessive force.                          Wash hands with soap and water before and after dressing changes.                         Prior to applying a clean dressing, cleanse wound with normal saline,                                wound cleanser, or mild soap and water.                           Ask the physician or nurse before getting the wound(s) wet in a shower        Daily Wound management:                          Keep weight off wounds and reposition every 2 hours.                          Avoid standing for long periods of time.                          Apply wraps/stockings in AM and remove at bedtime.                          If swelling is present, elevate legs to the level of the heart or above for 30  minutes 4-5 times a day and/or when sitti   ng.                                                  When taking antibiotics take entire prescription as ordered by physician do not stop taking until medicine is all gone.                             Orders for this week:  5/2/2019     LEFT POSTERIOR LEG/ right anterior leg --  CLEANSE WITH NORMAL SALINE PAT DRY.  Apply mepitel to wound bed, then use damp hydra fera blue to wound bed, cover with abd, wrap with conform and tape . Change every other day. ((make sure to moisten dressing with saline prior to removing))) . LOTION INTACT SKIN.  APPLY TUBIGRIP D IN THE AM AND OFF IN THE PM.       Follow up with Melanie Monsivais NP in 2 weeks  in the wound care center.     Call [phone] for any questions or concerns.     Date__________   Time____________                             Treatment Note Wound 05/02/19 Leg Proximal;Right; Anterior #13-Dressing/Treatment: (Mepitel, damp hydrophera blue, ABD, Conform, Tape, Tubi D)  Wound 02/05/19 #12 LEFT POSTERIOR LEG (ONSET TWO DAYS AGO) VENOUS-Dressing/Treatment: (Mepitel, damp hydrophera blue, ABD, Conform, Tape, Tubi D)    Written Patient Dismissal Instructions Given            Electronically signed by Maisha Echeverria, APRN - CNP on 5/2/2019 at 1:53 PM

## 2019-05-07 RX ORDER — HYDRALAZINE HYDROCHLORIDE 25 MG/1
25 TABLET, FILM COATED ORAL 3 TIMES DAILY
Qty: 90 TABLET | Refills: 5 | Status: SHIPPED | OUTPATIENT
Start: 2019-05-07 | End: 2019-10-11 | Stop reason: SDUPTHER

## 2019-05-10 ENCOUNTER — OFFICE VISIT (OUTPATIENT)
Dept: INTERNAL MEDICINE CLINIC | Age: 84
End: 2019-05-10
Payer: MEDICARE

## 2019-05-10 VITALS
BODY MASS INDEX: 22.14 KG/M2 | SYSTOLIC BLOOD PRESSURE: 110 MMHG | WEIGHT: 137.2 LBS | RESPIRATION RATE: 16 BRPM | DIASTOLIC BLOOD PRESSURE: 72 MMHG | HEART RATE: 73 BPM | OXYGEN SATURATION: 94 %

## 2019-05-10 DIAGNOSIS — R42 VERTIGO: ICD-10-CM

## 2019-05-10 DIAGNOSIS — R60.9 PERIPHERAL EDEMA: ICD-10-CM

## 2019-05-10 DIAGNOSIS — D68.9 COAGULATION DEFECT (HCC): ICD-10-CM

## 2019-05-10 DIAGNOSIS — M15.9 OSTEOARTHRITIS, GENERALIZED: ICD-10-CM

## 2019-05-10 DIAGNOSIS — E78.2 MIXED HYPERLIPIDEMIA: ICD-10-CM

## 2019-05-10 DIAGNOSIS — I63.50 CEREBROVASCULAR ACCIDENT (CVA) DUE TO STENOSIS OF CEREBRAL ARTERY (HCC): Primary | ICD-10-CM

## 2019-05-10 DIAGNOSIS — K21.00 GASTRO-ESOPHAGEAL REFLUX DISEASE WITH ESOPHAGITIS: ICD-10-CM

## 2019-05-10 DIAGNOSIS — N18.30 CKD (CHRONIC KIDNEY DISEASE) STAGE 3, GFR 30-59 ML/MIN (HCC): ICD-10-CM

## 2019-05-10 DIAGNOSIS — D62 ACUTE BLOOD LOSS ANEMIA: ICD-10-CM

## 2019-05-10 DIAGNOSIS — R73.9 HYPERGLYCEMIA: ICD-10-CM

## 2019-05-10 PROCEDURE — 4040F PNEUMOC VAC/ADMIN/RCVD: CPT | Performed by: INTERNAL MEDICINE

## 2019-05-10 PROCEDURE — G8427 DOCREV CUR MEDS BY ELIG CLIN: HCPCS | Performed by: INTERNAL MEDICINE

## 2019-05-10 PROCEDURE — 99214 OFFICE O/P EST MOD 30 MIN: CPT | Performed by: INTERNAL MEDICINE

## 2019-05-10 PROCEDURE — G8420 CALC BMI NORM PARAMETERS: HCPCS | Performed by: INTERNAL MEDICINE

## 2019-05-10 PROCEDURE — G8598 ASA/ANTIPLAT THER USED: HCPCS | Performed by: INTERNAL MEDICINE

## 2019-05-10 PROCEDURE — 1036F TOBACCO NON-USER: CPT | Performed by: INTERNAL MEDICINE

## 2019-05-10 PROCEDURE — 1090F PRES/ABSN URINE INCON ASSESS: CPT | Performed by: INTERNAL MEDICINE

## 2019-05-10 PROCEDURE — 1123F ACP DISCUSS/DSCN MKR DOCD: CPT | Performed by: INTERNAL MEDICINE

## 2019-05-10 RX ORDER — RANITIDINE 150 MG/1
150 CAPSULE ORAL 2 TIMES DAILY
Qty: 180 CAPSULE | Refills: 1 | Status: SHIPPED | OUTPATIENT
Start: 2019-05-10 | End: 2019-08-12 | Stop reason: SDUPTHER

## 2019-05-10 RX ORDER — LANOLIN ALCOHOL/MO/W.PET/CERES
325 CREAM (GRAM) TOPICAL
Qty: 90 TABLET | Refills: 1 | Status: SHIPPED | OUTPATIENT
Start: 2019-05-10 | End: 2019-06-24 | Stop reason: SDUPTHER

## 2019-05-10 RX ORDER — MECLIZINE HYDROCHLORIDE CHEWABLE TABLETS 25 MG/1
25 TABLET, CHEWABLE ORAL EVERY 6 HOURS PRN
Qty: 90 TABLET | Refills: 1 | Status: SHIPPED | OUTPATIENT
Start: 2019-05-10 | End: 2019-08-12 | Stop reason: SDUPTHER

## 2019-05-10 RX ORDER — CYCLOBENZAPRINE HCL 5 MG
5 TABLET ORAL EVERY EVENING
Qty: 90 TABLET | Refills: 1 | Status: SHIPPED | OUTPATIENT
Start: 2019-05-10 | End: 2019-10-11 | Stop reason: SDUPTHER

## 2019-05-10 RX ORDER — WARFARIN SODIUM 2 MG/1
2 TABLET ORAL DAILY
Qty: 90 TABLET | Refills: 1 | Status: SHIPPED | OUTPATIENT
Start: 2019-05-10 | End: 2019-08-12 | Stop reason: SDUPTHER

## 2019-05-10 RX ORDER — LOVASTATIN 40 MG/1
40 TABLET ORAL 2 TIMES DAILY
Qty: 180 TABLET | Refills: 1 | Status: SHIPPED | OUTPATIENT
Start: 2019-05-10 | End: 2019-10-11 | Stop reason: SDUPTHER

## 2019-05-10 RX ORDER — HYDROCHLOROTHIAZIDE 12.5 MG/1
12.5 TABLET ORAL SEE ADMIN INSTRUCTIONS
Qty: 48 TABLET | Refills: 3 | Status: SHIPPED | OUTPATIENT
Start: 2019-05-10 | End: 2019-07-10 | Stop reason: SDUPTHER

## 2019-05-10 NOTE — PROGRESS NOTES
Frederic Ibarra  5/30/1922  05/10/19    SUBJECTIVE:    Recurring wound noted and has close w/u w [facility] and also wound center, currently bilat excoriations    CVA- INR was sl low last time but had missed two doses, no recent weakness, numbness. Chr dizzy    Hypertension: Stable. Denies CP, SOB, cough, visual changes, palpitations or HA.          CKD- will monitor renal fxn. Lipids:  Is continuing statin therapy and low fat diet. Tolerating medications w/o myalgias or GI upset. GERD:  Is without sx of heartburn or dysphagia, abd pain. OBJECTIVE:    /72   Pulse 73   Resp 16   Wt 137 lb 3.2 oz (62.2 kg)   LMP  (LMP Unknown)   SpO2 94%   BMI 22.14 kg/m²     Physical Exam   Constitutional: She appears well-developed and well-nourished. No distress. HENT:   Head: Normocephalic and atraumatic. Nose: Nose normal.   Mouth/Throat: Oropharynx is clear and moist. No oropharyngeal exudate. Eyes: Pupils are equal, round, and reactive to light. Conjunctivae and EOM are normal. Right eye exhibits no discharge. Left eye exhibits no discharge. No scleral icterus. Neck: Neck supple. No tracheal deviation present. Cardiovascular: Normal rate, regular rhythm, normal heart sounds and intact distal pulses. Exam reveals no gallop and no friction rub. No murmur heard. Pulmonary/Chest: Effort normal and breath sounds normal. No respiratory distress. She has no wheezes. She has no rales. Abdominal: Soft. Bowel sounds are normal. She exhibits no distension and no mass. There is no tenderness. There is no rebound and no guarding. Musculoskeletal: She exhibits no edema. Lymphadenopathy:     She has no cervical adenopathy. Neurological: She is alert. She has normal reflexes. Skin: Skin is warm and dry. Psychiatric: She has a normal mood and affect. Vitals reviewed. ASSESSMENT:    1.  Cerebrovascular accident (CVA) due to stenosis of cerebral artery ([facility]    2. Coagulation defect ([facility] Future  -     TSH without Reflex; Future    Gastro-esophageal reflux disease with esophagitis - GERD controlled on meds and will refill, monitor for any recurrent or worsening sx.    -     ranitidine (ZANTAC) 150 MG capsule; Take 1 capsule by mouth 2 times daily    CKD (chronic kidney disease) stage 3, GFR 30-59 ml/min ([facility])- cont monitor renal fxn, last creat 2.1. Acute blood loss anemia- cont supplement and monitor cbc  -     ferrous sulfate 325 (65 Fe) MG EC tablet;  Take 1 tablet by mouth daily (with breakfast)    Hyperglycemia- last glc high, monitor for DM  -     Hemoglobin A1C; Future

## 2019-05-14 ENCOUNTER — HOSPITAL ENCOUNTER (OUTPATIENT)
Age: 84
Setting detail: SPECIMEN
Discharge: HOME | End: 2019-05-14
Payer: MEDICARE

## 2019-05-14 LAB
ALBUMIN SERPL-MCNC: 3.9 G/DL
ALBUMIN SERPL-MCNC: 3.9 GM/DL (ref 3.4–5)
ALP BLD-CCNC: 73 IU/L (ref 40–128)
ALP BLD-CCNC: 73 U/L
ALT SERPL-CCNC: 15 U/L
ALT SERPL-CCNC: 15 U/L (ref 10–40)
ANION GAP SERPL CALCULATED.3IONS-SCNC: 12 MMOL/L
ANION GAP SERPL CALCULATED.3IONS-SCNC: 12 MMOL/L (ref 4–16)
AST SERPL-CCNC: 20 IU/L (ref 15–37)
AST SERPL-CCNC: 20 U/L
AVERAGE GLUCOSE: 123
BASOPHILS ABSOLUTE: 0 K/CU MM
BASOPHILS RELATIVE PERCENT: 0.4 % (ref 0–1)
BILIRUB SERPL-MCNC: 0.3 MG/DL (ref 0.1–1.4)
BILIRUB SERPL-MCNC: 0.3 MG/DL (ref 0–1)
BUN BLDV-MCNC: 24 MG/DL
BUN BLDV-MCNC: 24 MG/DL (ref 6–23)
CALCIUM SERPL-MCNC: 9.3 MG/DL
CALCIUM SERPL-MCNC: 9.3 MG/DL (ref 8.3–10.6)
CHLORIDE BLD-SCNC: 99 MMOL/L
CHLORIDE BLD-SCNC: 99 MMOL/L (ref 99–110)
CHOLESTEROL: 158 MG/DL
CO2: 27 MMOL/L
CO2: 27 MMOL/L (ref 21–32)
CREAT SERPL-MCNC: 1.3 MG/DL
CREAT SERPL-MCNC: 1.3 MG/DL (ref 0.6–1.1)
DIFFERENTIAL TYPE: ABNORMAL
EOSINOPHILS ABSOLUTE: 0 K/CU MM
EOSINOPHILS RELATIVE PERCENT: 0.4 % (ref 0–3)
ESTIMATED AVERAGE GLUCOSE: 123 MG/DL
GFR AFRICAN AMERICAN: 46 ML/MIN/1.73M2
GFR CALCULATED: 38
GFR NON-AFRICAN AMERICAN: 38 ML/MIN/1.73M2
GLUCOSE BLD-MCNC: 98 MG/DL
GLUCOSE BLD-MCNC: 98 MG/DL (ref 70–99)
HBA1C MFR BLD: 5.9 %
HBA1C MFR BLD: 5.9 % (ref 4.2–6.3)
HCT VFR BLD CALC: 35.2 % (ref 37–47)
HDLC SERPL-MCNC: 86 MG/DL
HEMOGLOBIN: 10.5 GM/DL (ref 12.5–16)
IMMATURE NEUTROPHIL %: 0.4 % (ref 0–0.43)
LDL CHOLESTEROL DIRECT: 70 MG/DL
LYMPHOCYTES ABSOLUTE: 1 K/CU MM
LYMPHOCYTES RELATIVE PERCENT: 12 % (ref 24–44)
MCH RBC QN AUTO: 28.1 PG (ref 27–31)
MCHC RBC AUTO-ENTMCNC: 29.8 % (ref 32–36)
MCV RBC AUTO: 94.1 FL (ref 78–100)
MONOCYTES ABSOLUTE: 0.4 K/CU MM
MONOCYTES RELATIVE PERCENT: 5.4 % (ref 0–4)
NUCLEATED RBC %: 0 %
PDW BLD-RTO: 16.7 % (ref 11.7–14.9)
PLATELET # BLD: 251 K/CU MM (ref 140–440)
PMV BLD AUTO: 11 FL (ref 7.5–11.1)
POTASSIUM SERPL-SCNC: 4.3 MMOL/L
POTASSIUM SERPL-SCNC: 4.3 MMOL/L (ref 3.5–5.1)
RBC # BLD: 3.74 M/CU MM (ref 4.2–5.4)
SEGMENTED NEUTROPHILS ABSOLUTE COUNT: 6.6 K/CU MM
SEGMENTED NEUTROPHILS RELATIVE PERCENT: 81.4 % (ref 36–66)
SODIUM BLD-SCNC: 138 MMOL/L
SODIUM BLD-SCNC: 138 MMOL/L (ref 135–145)
TOTAL IMMATURE NEUTOROPHIL: 0.03 K/CU MM
TOTAL NUCLEATED RBC: 0 K/CU MM
TOTAL PROTEIN: 5.8
TOTAL PROTEIN: 5.8 GM/DL (ref 6.4–8.2)
TRIGL SERPL-MCNC: 96 MG/DL
TSH HIGH SENSITIVITY: 1.53 UIU/ML (ref 0.27–4.2)
WBC # BLD: 8.1 K/CU MM (ref 4–10.5)

## 2019-05-14 PROCEDURE — 80053 COMPREHEN METABOLIC PANEL: CPT

## 2019-05-14 PROCEDURE — 83036 HEMOGLOBIN GLYCOSYLATED A1C: CPT

## 2019-05-14 PROCEDURE — 84443 ASSAY THYROID STIM HORMONE: CPT

## 2019-05-14 PROCEDURE — 83721 ASSAY OF BLOOD LIPOPROTEIN: CPT

## 2019-05-14 PROCEDURE — 80061 LIPID PANEL: CPT

## 2019-05-14 PROCEDURE — 85025 COMPLETE CBC W/AUTO DIFF WBC: CPT

## 2019-05-16 ENCOUNTER — HOSPITAL ENCOUNTER (OUTPATIENT)
Dept: WOUND CARE | Age: 84
Discharge: HOME | End: 2019-05-16
Payer: MEDICARE

## 2019-05-16 VITALS
RESPIRATION RATE: 18 BRPM | DIASTOLIC BLOOD PRESSURE: 70 MMHG | HEART RATE: 84 BPM | SYSTOLIC BLOOD PRESSURE: 145 MMHG | TEMPERATURE: 98.7 F

## 2019-05-16 DIAGNOSIS — L97.922 TRAUMATIC ULCER OF LEFT LOWER LEG WITH FAT LAYER EXPOSED (HCC): ICD-10-CM

## 2019-05-16 DIAGNOSIS — L97.912 TRAUMATIC ULCER OF RIGHT LOWER EXTREMITY WITH FAT LAYER EXPOSED (HCC): ICD-10-CM

## 2019-05-16 DIAGNOSIS — I87.313 CHRONIC VENOUS HYPERTENSION (IDIOPATHIC) WITH ULCER OF BILATERAL LOWER EXTREMITY (CODE) (HCC): Primary | ICD-10-CM

## 2019-05-16 PROCEDURE — 11042 DBRDMT SUBQ TIS 1ST 20SQCM/<: CPT | Performed by: NURSE PRACTITIONER

## 2019-05-16 PROCEDURE — 11042 DBRDMT SUBQ TIS 1ST 20SQCM/<: CPT

## 2019-05-16 ASSESSMENT — PAIN SCALES - GENERAL: PAINLEVEL_OUTOF10: 0

## 2019-05-16 NOTE — PROGRESS NOTES
Wound Care Center Progress Note With Procedure    Thad Henriquez  AGE: 80 y.o. GENDER: female  : 1922  EPISODE DATE:  2019     Subjective:     Chief Complaint   Patient presents with    Wound Check     left leg          HISTORY of PRESENT ILLNESS      Thad Henriquez is a 80 y.o. female who presents today for wound evaluation of Chronic venous and traumatic ulcer(s) of the bilateral lower legs. The ulcer is of moderate severity. The underlying cause of the wound is venous and trauma.    Wound Pain Timing/Severity: intermittent  Quality of pain: tender  Severity of pain:  2  10   Modifying Factors: edema and venous stasis  Associated Signs/Symptoms: none        PAST MEDICAL HISTORY        Diagnosis Date    AAA (abdominal aortic aneurysm) ([address]     DISCUSSED W PT;/FAMILY 2019- NO FURTHER INTERVENTION PLANNED AS POOR CANDIDATE    Basosquamous carcinoma     L leg - Dr Dipesh Jean COPD (chronic obstructive pulmonary disease) ([address]     CVA (cerebral vascular accident) ([address]     L parietal w speech deficit  ~2000, sx resolved    Gastro-esophageal reflux disease with esophagitis     Gout     hand swelling and pain    Hyperlipidemia     Non-healing surgical wound     Osteoarthritis     Osteoarthritis, generalized     on chr pred started by Rheum    Peripheral vascular disease ([address] 2008, 2008    S/P PTA and stents X 2 to Right leg- Dr Sebas Pedro Thoracic aortic aneurysm ([address]     DISCUSSED W PT;/FAMILY 2019- NO FURTHER INTERVENTION PLANNED AS POOR CANDIDATE    Venous stasis ulcer ([address] 2012       PAST SURGICAL HISTORY    Past Surgical History:   Procedure Laterality Date    APPENDECTOMY      ARTERY SURGERY      STENT TO LEFT FEMORIAL AND ALSO TO RIGHT SUPRA FEMORIAL ARTERY    FRACTURE SURGERY      Left ???   -ORIF    HYSTERECTOMY, TOTAL ABDOMINAL  1949    age 27-for fibroids    LEG SURGERY      2008-Right leg- PTA and Stent for PVD - Dr Ciera Nevarez:   2008- Right leg -PTA and Stent- Dr Nicklas Bence  12    L basosquamous cell CA L leg    VASCULAR SURGERY         FAMILY HISTORY    Family History   Problem Relation Age of Onset    Cancer Mother     Early Death Mother     Hearing Loss Mother     Cancer Sister        SOCIAL HISTORY    Social History     Tobacco Use    Smoking status: Former Smoker     Packs/day: 0.00     Last attempt to quit: 2000     Years since quittin.3    Smokeless tobacco: Former User    Tobacco comment: Prior Hx of 1 Pack per week, none currently   Substance Use Topics    Alcohol use: Yes     Comment: pt states \"one drink a day\"    Drug use: No       ALLERGIES    Allergies   Allergen Reactions    Lisinopril      Cough      Sulfa Antibiotics     Pcn [Penicillins] Rash     Does tolerate keflex       MEDICATIONS    Current Outpatient Medications on File Prior to Encounter   Medication Sig Dispense Refill    warfarin (COUMADIN) 2 MG tablet Take 1 tablet by mouth daily Take 2mg every day except 1mg on Sundays 90 tablet 1    ferrous sulfate 325 (65 Fe) MG EC tablet Take 1 tablet by mouth daily (with breakfast) 90 tablet 1    hydrochlorothiazide (HYDRODIURIL) 12.5 MG tablet Take 1 tablet by mouth See Admin Instructions Take 1 tablet  48 tablet 3    cyclobenzaprine (FLEXERIL) 5 MG tablet Take 1 tablet by mouth every evening 90 tablet 1    meclizine (TRAVEL SICKNESS) 25 MG CHEW Take 1 tablet by mouth every 6 hours as needed (dizziness/vertigo) 90 tablet 1    lovastatin (MEVACOR) 40 MG tablet Take 1 tablet by mouth 2 times daily TAKE ONE (1) TABLET BY MOUTH TWO TIMES DAILY jscript:void(0) 180 tablet 1    ranitidine (ZANTAC) 150 MG capsule Take 1 capsule by mouth 2 times daily 180 capsule 1    hydrALAZINE (APRESOLINE) 25 MG tablet Take 1 tablet by mouth 3 times daily 90 tablet 5    losartan (COZAAR) 25 MG tablet Take 1 tablet by mouth daily 90 tablet 1    clopidogrel (PLAVIX) 75 MG tablet Take 1 tablet by mouth every other day 45 tablet 3    predniSONE (DELTASONE) 5 MG tablet Take 1 tablet by mouth daily 90 tablet 1    acetaminophen (TYLENOL) 325 MG tablet Take 2 tablets by mouth every 4 hours as needed for Pain 120 tablet 3     No current facility-administered medications on file prior to encounter. REVIEW OF SYSTEMS    Pertinent items are noted in HPI. Constitutional: Negative for systemic symptoms including fever, chills and malaise. Objective:      BP (!) 145/70   Pulse 84   Temp 98.7 °F (37.1 °C) (Temporal)   Resp 18   LMP  (LMP Unknown)     PHYSICAL EXAM    General: The patient is in no acute distress. Mental status:  Patient is appropriate, is  oriented to place and plan of care. Dermatologic exam: Visual inspection of the periwound reveals the skin to be edematous  Wound exam: see wound description below in procedure note      Assessment:     Problem List Items Addressed This Visit     WD-Chronic venous hypertension (idiopathic) with ulcer of bilateral lower extremity (CODE) ([address] - Primary    WD-Traumatic ulcer of left lower leg with fat layer exposed ([address]    WD-Traumatic ulcer of right lower extremity with fat layer exposed ([address]        Procedure Note    Indications:  Based on my examination of this patient's wound(s) today, sharp excision into necrotic subcutaneous tissue is required to promote healing and evaluate the extent of previous healing. Performed by: LES Pinon - CNP    Consent obtained: Yes    Time out taken:  Yes    Pain Control: Liquid lidocaine       Debridement:Excisional Debridement    Using #15 blade scalpel the wound(s) was/were sharply debrided down through and including the removal of subcutaneous tissue.         Devitalized Tissue Debrided:  biofilm, slough and exudate    Pre Debridement Measurements:  Are located in the Wound Documentation Flow Sheet    All active wounds listed below with today's date are evaluated  Wound(s)    debrided this date include # : 12 and 13     Post  Debridement Measurements:  Wound 02/05/19 #12 LEFT POSTERIOR LEG (ONSET TWO DAYS AGO) VENOUS (Active)   Wound Image   5/16/2019  1:01 PM   Wound Venous 5/16/2019  1:01 PM   Dressing Status Clean;Dry; Intact 5/2/2019  1:39 PM   Dressing Changed Changed/New 5/2/2019  1:39 PM   Wound Cleansed Rinsed/Irrigated with saline 5/16/2019  1:01 PM   Wound Length (cm) 3.4 cm 5/16/2019  1:01 PM   Wound Width (cm) 1.8 cm 5/16/2019  1:01 PM   Wound Depth (cm) 0.1 cm 5/16/2019  1:01 PM   Wound Surface Area (cm^2) 6.12 cm^2 5/16/2019  1:01 PM   Change in Wound Size % (l*w) 82.21 5/16/2019  1:01 PM   Wound Volume (cm^3) 0.61 cm^3 5/16/2019  1:01 PM   Wound Healing % 91 5/16/2019  1:01 PM   Post-Procedure Length (cm) 3.4 cm 5/16/2019  1:17 PM   Post-Procedure Width (cm) 1.8 cm 5/16/2019  1:17 PM   Post-Procedure Depth (cm) 0.1 cm 5/16/2019  1:17 PM   Post-Procedure Surface Area (cm^2) 6.12 cm^2 5/16/2019  1:17 PM   Post-Procedure Volume (cm^3) 0.61 cm^3 5/16/2019  1:17 PM   Distance Tunneling (cm) 0 cm 5/16/2019  1:01 PM   Tunneling Position ___ O'Clock 0 5/16/2019  1:01 PM   Undermining Starts ___ O'Clock 0 5/16/2019  1:01 PM   Undermining Ends___ O'Clock 0 5/16/2019  1:01 PM   Undermining Maxium Distance (cm) 0 5/16/2019  1:01 PM   Wound Assessment Red;Yellow 5/16/2019  1:01 PM   Drainage Amount Moderate 5/16/2019  1:01 PM   Drainage Description Serosanguinous 5/16/2019  1:01 PM   Odor None 5/16/2019  1:01 PM   Margins Defined edges 5/16/2019  1:01 PM   Supriya-wound Assessment Dry 5/16/2019  1:01 PM   Non-staged Wound Description Full thickness 5/16/2019  1:01 PM   [place]%Wound Bed 0 5/16/2019  1:01 PM   Red%Wound Bed 50 5/16/2019  1:01 PM   Yellow%Wound Bed 50 5/16/2019  1:01 PM   Black%Wound Bed 0 5/16/2019  1:01 PM   Purple%Wound Bed 0 5/16/2019  1:01 PM   Other%Wound Bed 0 5/16/2019  1:01 PM   Culture Taken Yes 2/14/2019  1:31 PM   Number of days: 99       Wound 05/02/19 Leg Proximal;Right; Anterior #13 (Active)   Wound Image   5/16/2019  1:01 PM   Wound Traumatic 5/16/2019  1:01 PM   Dressing Status Clean;Dry; Intact 5/2/2019  1:39 PM   Dressing Changed Changed/New 5/2/2019  1:39 PM   Wound Cleansed Rinsed/Irrigated with saline 5/16/2019  1:01 PM   Wound Length (cm) 1.1 cm 5/16/2019  1:01 PM   Wound Width (cm) 0.9 cm 5/16/2019  1:01 PM   Wound Depth (cm) 0.1 cm 5/16/2019  1:01 PM   Wound Surface Area (cm^2) 0.99 cm^2 5/16/2019  1:01 PM   Change in Wound Size % (l*w) 2.94 5/16/2019  1:01 PM   Wound Volume (cm^3) 0.1 cm^3 5/16/2019  1:01 PM   Wound Healing % 0 5/16/2019  1:01 PM   Distance Tunneling (cm) 0 cm 5/16/2019  1:01 PM   Tunneling Position ___ O'Clock 0 5/16/2019  1:01 PM   Undermining Starts ___ O'Clock 0 5/16/2019  1:01 PM   Undermining Ends___ O'Clock 0 5/16/2019  1:01 PM   Undermining Maxium Distance (cm) 0 5/16/2019  1:01 PM   Wound Assessment Pink;Yellow 5/16/2019  1:01 PM   Drainage Amount Moderate 5/16/2019  1:01 PM   Drainage Description Serosanguinous 5/16/2019  1:01 PM   Odor None 5/16/2019  1:01 PM   Margins Defined edges 5/16/2019  1:01 PM   Supriya-wound Assessment Pink;Dark edges 5/16/2019  1:01 PM   Non-staged Wound Description Full thickness 5/16/2019  1:01 PM   [place]%Wound Bed 50 5/16/2019  1:01 PM   Red%Wound Bed 0 5/16/2019  1:01 PM   Yellow%Wound Bed 50 5/16/2019  1:01 PM   Black%Wound Bed 0 5/16/2019  1:01 PM   Purple%Wound Bed 0 5/16/2019  1:01 PM   Other%Wound Bed 0 5/16/2019  1:01 PM   Number of days: 14       Percent of Wound(s) Debrided: approximately 100%    Total  Area  Debrided:  7 sq cm     Bleeding:  Minimal    Hemostasis Achieved:  by pressure    Procedural Pain:  0  / 10     Post Procedural Pain:  0 / 10     Response to treatment:  Well tolerated by patient. Status of wound progress and description from last visit:   improving.       Plan:       Discharge Instructions            Plan:         Discharge Instructions        PHYSICIAN ORDERS AND DISCHARGE INSTRUCTIONS     FAX TO [facility]     NOTE: Upon discharge from the [address], you will receive a patient experience survey. We would be grateful if you would take the time to fill this survey out.     Wound care order history:                               Vascular studies: ARTERIAL STUDIES COMPLETE  Date 2/22/2018 AND 4/16/18-                                             VASCULAR  INTERVENTION SCHEDULED FOR 7/2/2018                                                     Imaging:   Date               Cultures:   Date 5/3/18 CULTURE OBTAINED; 6/14/18-wound culture postivie scanty growth MRSA-discussed with patient and son-doxycyline RX                                  9/73/63: Culture of left lower leg medial wound obtained      1/17/19 CULTURE OBTAINED    2/14/19 CULTURE OBTAINED              Labs/ HbA1c:   Date               Grafts: 8/30/2018: Puraply #1 Lot #[id number]                                              Puraply #2 Lot #-[id number]. 1.1B                                              Puraply #3 Lot # [id number]. 1.1B                                              Puraply # 4 Lot #: [id number]. 1.1B                                              Puraply #5 (2x2) applied                                              Nushield # 1 (2x3) applied on 10/11/18                                               Nushield #2 (2x3) applied on 10/18/18                                              Affinity #1 (2.5x2.5) applied on 11/8/18                                              Affinity #2 (2.5x2.5) applied on 11/16/18                                              Affinity #3 (2.5 x 2.5) applied 11/29/18                  HBO:                 Antibiotics:  10/18/18 Doxycycline 100 mg PO BID x 10 days called to [facility];  1/7/19 CLINDAMYCIN ORDERED FROM PCP              Earlier Wound care treatments:  Santyl ordered              Authorizations:                        Consults: DR Bella Estrada care physician:  PRITESH     Continuing wound care orders and information:              Residence: HOME              Continue home health care [address]               Your wound-care supplies will be provided by: [facility] [place] provider: [address] with              FPSEAN loading:  Date               CGKJA Medications: RX BACTROBAN 2% 22 GRAM TUBE APPLY TO WOUND BED DAILY. REFILL 0 (CALLED INTO [facility])        Wound cleansing:                           VZ not scrub or use excessive force.                          Wash hands with soap and water before and after dressing changes.                         Prior to applying a clean dressing, cleanse wound with normal saline,                                wound cleanser, or mild soap and water.                           Ask the physician or nurse before getting the wound(s) wet in a shower        Daily Wound management:                          Keep weight off wounds and reposition every 2 hours.                          Avoid standing for long periods of time.                          Apply wraps/stockings in AM and remove at bedtime.                          If swelling is present, elevate legs to the level of the heart or above for 30  minutes 4-5 times a day and/or when sitti   ng.                                                  When taking antibiotics take entire prescription as ordered by physician do not stop taking until medicine is all gone.                             Orders for this week:  5/16/2019     LEFT POSTERIOR LEG/ right anterior leg --  CLEANSE WITH NORMAL SALINE PAT DRY.  Apply mepitel to wound bed, then use damp hydra fera blue to wound bed, cover with abd, wrap with conform and tape . Change every other day. ((make sure to moisten dressing with saline prior to removing))) . LOTION INTACT SKIN.  APPLY TUBIGRIP D IN THE AM AND OFF IN THE PM.       Follow up with Gabi Roy NP in 3 weeks  in the wound care [place].     Call [phone] for any questions or concerns.     Date__________   Time____________                                         Treatment Note      Written Patient Dismissal Instructions Given            Electronically signed by LES Cannon CNP on 5/16/2019 at 1:40 PM

## 2019-05-16 NOTE — PLAN OF CARE
Problem: Wound:  Goal: Will show signs of wound healing; wound closure and no evidence of infection  Description  Will show signs of wound healing; wound closure and no evidence of infection   Outcome: Ongoing

## 2019-05-21 ENCOUNTER — ANTI-COAG VISIT (OUTPATIENT)
Dept: INTERNAL MEDICINE CLINIC | Age: 84
End: 2019-05-21

## 2019-05-21 LAB — INR BLD: 2.2

## 2019-06-13 ENCOUNTER — HOSPITAL ENCOUNTER (OUTPATIENT)
Dept: WOUND CARE | Age: 84
Discharge: HOME | End: 2019-06-13
Payer: MEDICARE

## 2019-06-13 VITALS
HEART RATE: 88 BPM | SYSTOLIC BLOOD PRESSURE: 154 MMHG | TEMPERATURE: 97.8 F | RESPIRATION RATE: 18 BRPM | DIASTOLIC BLOOD PRESSURE: 67 MMHG

## 2019-06-13 DIAGNOSIS — L97.912 TRAUMATIC ULCER OF RIGHT LOWER EXTREMITY WITH FAT LAYER EXPOSED (HCC): ICD-10-CM

## 2019-06-13 DIAGNOSIS — L97.922 TRAUMATIC ULCER OF LEFT LOWER LEG WITH FAT LAYER EXPOSED (HCC): ICD-10-CM

## 2019-06-13 DIAGNOSIS — I87.313 CHRONIC VENOUS HYPERTENSION (IDIOPATHIC) WITH ULCER OF BILATERAL LOWER EXTREMITY (CODE) (HCC): Primary | ICD-10-CM

## 2019-06-13 PROCEDURE — 11042 DBRDMT SUBQ TIS 1ST 20SQCM/<: CPT

## 2019-06-13 PROCEDURE — 11042 DBRDMT SUBQ TIS 1ST 20SQCM/<: CPT | Performed by: NURSE PRACTITIONER

## 2019-06-13 RX ORDER — LIDOCAINE HYDROCHLORIDE 40 MG/ML
SOLUTION TOPICAL ONCE
Status: DISCONTINUED | OUTPATIENT
Start: 2019-06-13 | End: 2019-06-14 | Stop reason: HOSPADM

## 2019-06-13 NOTE — PROGRESS NOTES
Wound Care Center Progress Note With Procedure    Mey Cui  AGE: 80 y.o. GENDER: female  : 1922  EPISODE DATE:  2019     Subjective:     Chief Complaint   Patient presents with    Wound Check     Bilateral Legs         HISTORY of PRESENT ILLNESS      Mey Cui is a 80 y.o. female who presents today for wound evaluation of Chronic venous and traumatic ulcer(s) of the bilateral lower legs. The ulcer is of moderate severity. The underlying cause of the wound is venous and trauma. There is a new wound to the right posterior leg today. She does not remember hitting or bumping her leg. The area was debrided of congealed, clotted, and dried blood.   Wound Pain Timing/Severity: none  Quality of pain: N/A  Severity of pain:  0 / 10   Modifying Factors: edema and venous stasis  Associated Signs/Symptoms: none        PAST MEDICAL HISTORY        Diagnosis Date    AAA (abdominal aortic aneurysm) ([address]     DISCUSSED W PT;/FAMILY 2019- NO FURTHER INTERVENTION PLANNED AS POOR CANDIDATE    Basosquamous carcinoma     L leg - Dr Toy Shrestha COPD (chronic obstructive pulmonary disease) ([address]     CVA (cerebral vascular accident) ([address]     L parietal w speech deficit  ~, sx resolved    Gastro-esophageal reflux disease with esophagitis     Gout     hand swelling and pain    Hyperlipidemia     Non-healing surgical wound     Osteoarthritis     Osteoarthritis, generalized     on chr pred started by Rheum    Peripheral vascular disease ([address] 2008, 2008    S/P PTA and stents X 2 to Right leg- Dr Rafael Godfrey Thoracic aortic aneurysm ([address]     DISCUSSED W PT;/FAMILY 2019- NO FURTHER INTERVENTION PLANNED AS POOR CANDIDATE    Venous stasis ulcer ([address] 2012       PAST SURGICAL HISTORY    Past Surgical History:   Procedure Laterality Date    APPENDECTOMY      ARTERY SURGERY      STENT TO LEFT FEMORIAL AND ALSO TO RIGHT SUPRA FEMORIAL ARTERY    FRACTURE SURGERY      Left ???   -ORIF 75 MG tablet Take 1 tablet by mouth every other day 45 tablet 3    meclizine (TRAVEL SICKNESS) 25 MG CHEW Take 1 tablet by mouth every 6 hours as needed (dizziness/vertigo) 90 tablet 1    acetaminophen (TYLENOL) 325 MG tablet Take 2 tablets by mouth every 4 hours as needed for Pain 120 tablet 3     No current facility-administered medications on file prior to encounter. REVIEW OF SYSTEMS    Pertinent items are noted in HPI. Constitutional: Negative for systemic symptoms including fever, chills and malaise. Objective:      BP (!) 154/67   Pulse 88   Temp 97.8 °F (36.6 °C) (Temporal)   Resp 18   LMP  (LMP Unknown)     PHYSICAL EXAM    General: The patient is in no acute distress. Mental status:  Patient is appropriate, is  oriented to place and plan of care. Dermatologic exam: Visual inspection of the periwound reveals the skin to be normal in turgor and texture  Wound exam: see wound description below in procedure note      Assessment:     Problem List Items Addressed This Visit     WD-Chronic venous hypertension (idiopathic) with ulcer of bilateral lower extremity (CODE) ([facility] - Primary    WD-Traumatic ulcer of left lower leg with fat layer exposed ([address]    WD-Traumatic ulcer of right lower extremity with fat layer exposed ([address]        Procedure Note    Indications:  Based on my examination of this patient's wound(s) today, sharp excision into necrotic subcutaneous tissue is required to promote healing and evaluate the extent of previous healing. Performed by: LES Shearer - CNP    Consent obtained: Yes    Time out taken:  Yes    Pain Control: Anesthetic  Anesthetic: 4% Lidocaine Liquid Topical     Debridement:Excisional Debridement    Using #15 blade scalpel and forceps the wound(s) was/were sharply debrided down through and including the removal of subcutaneous tissue.         Devitalized Tissue Debrided:  biofilm, exudate and clots    Pre Debridement Measurements:  Are located in the Wound Documentation Flow Sheet    All active wounds listed below with today's date are evaluated  Wound(s)    debrided this date include # : 14     Post  Debridement Measurements:  Wound 02/05/19 #12 LEFT POSTERIOR LEG (ONSET TWO DAYS AGO) VENOUS (Active)   Wound Image   6/13/2019 12:58 PM   Wound Venous 6/13/2019 12:58 PM   Dressing Status Clean;Dry; Intact 6/13/2019  1:28 PM   Dressing Changed Changed/New 6/13/2019  1:28 PM   Wound Cleansed Rinsed/Irrigated with saline 6/13/2019 12:58 PM   Wound Length (cm) 3.2 cm 6/13/2019 12:58 PM   Wound Width (cm) 2.2 cm 6/13/2019 12:58 PM   Wound Depth (cm) 0.1 cm 6/13/2019 12:58 PM   Wound Surface Area (cm^2) 7.04 cm^2 6/13/2019 12:58 PM   Change in Wound Size % (l*w) 79.53 6/13/2019 12:58 PM   Wound Volume (cm^3) 0.7 cm^3 6/13/2019 12:58 PM   Wound Healing % 90 6/13/2019 12:58 PM   Post-Procedure Length (cm) 3.4 cm 5/16/2019  1:17 PM   Post-Procedure Width (cm) 1.8 cm 5/16/2019  1:17 PM   Post-Procedure Depth (cm) 0.1 cm 5/16/2019  1:17 PM   Post-Procedure Surface Area (cm^2) 6.12 cm^2 5/16/2019  1:17 PM   Post-Procedure Volume (cm^3) 0.61 cm^3 5/16/2019  1:17 PM   Distance Tunneling (cm) 0 cm 6/13/2019 12:58 PM   Tunneling Position ___ O'Clock 0 6/13/2019 12:58 PM   Undermining Starts ___ O'Clock 0 6/13/2019 12:58 PM   Undermining Ends___ O'Clock 0 6/13/2019 12:58 PM   Undermining Maxium Distance (cm) 0 6/13/2019 12:58 PM   Wound Assessment Red;Yellow 6/13/2019 12:58 PM   Drainage Amount Moderate 6/13/2019 12:58 PM   Drainage Description Serosanguinous 6/13/2019 12:58 PM   Odor None 6/13/2019 12:58 PM   Margins Defined edges 6/13/2019 12:58 PM   Supriya-wound Assessment Dry 6/13/2019 12:58 PM   Non-staged Wound Description Full thickness 6/13/2019 12:58 PM   [place]%Wound Bed 0 6/13/2019 12:58 PM   Red%Wound Bed 95 6/13/2019 12:58 PM   Yellow%Wound Bed 5 6/13/2019 12:58 PM   Black%Wound Bed 0 6/13/2019 12:58 PM   Purple%Wound Bed 0 6/13/2019 12:58 PM   Other%Wound Bed 0 6/13/2019 12:58 PM   Culture Taken Yes 2/14/2019  1:31 PM   Number of days: 127       Wound 05/02/19 Leg Proximal;Right; Anterior #13 (Active)   Wound Image   6/13/2019 12:58 PM   Wound Traumatic 6/13/2019 12:58 PM   Dressing Status Clean;Dry; Intact 6/13/2019  1:28 PM   Dressing Changed Changed/New 6/13/2019  1:28 PM   Wound Cleansed Rinsed/Irrigated with saline 6/13/2019 12:58 PM   Wound Length (cm) 0.8 cm 6/13/2019 12:58 PM   Wound Width (cm) 1.2 cm 6/13/2019 12:58 PM   Wound Depth (cm) 0.1 cm 6/13/2019 12:58 PM   Wound Surface Area (cm^2) 0.96 cm^2 6/13/2019 12:58 PM   Change in Wound Size % (l*w) 5.88 6/13/2019 12:58 PM   Wound Volume (cm^3) 0.1 cm^3 6/13/2019 12:58 PM   Wound Healing % 0 6/13/2019 12:58 PM   Distance Tunneling (cm) 0 cm 6/13/2019 12:58 PM   Tunneling Position ___ O'Clock 0 6/13/2019 12:58 PM   Undermining Starts ___ O'Clock 0 6/13/2019 12:58 PM   Undermining Ends___ O'Clock 0 6/13/2019 12:58 PM   Undermining Maxium Distance (cm) 0 6/13/2019 12:58 PM   Wound Assessment Red;Yellow 6/13/2019 12:58 PM   Drainage Amount Moderate 6/13/2019 12:58 PM   Drainage Description Serosanguinous 6/13/2019 12:58 PM   Odor None 6/13/2019 12:58 PM   Margins Defined edges 6/13/2019 12:58 PM   Supriya-wound Assessment Pink;Dark edges 6/13/2019 12:58 PM   Non-staged Wound Description Full thickness 6/13/2019 12:58 PM   [place]%Wound Bed 0 6/13/2019 12:58 PM   Red%Wound Bed 20 6/13/2019 12:58 PM   Yellow%Wound Bed 80 6/13/2019 12:58 PM   Black%Wound Bed 0 6/13/2019 12:58 PM   Purple%Wound Bed 0 6/13/2019 12:58 PM   Other%Wound Bed 0 6/13/2019 12:58 PM   Number of days: 42       Wound 06/13/19 Wound #14 Right Post. Leg (Onset x 1 Week) (Active)   Wound Image   6/13/2019  1:27 PM   Dressing Status Clean;Dry; Intact 6/13/2019  1:28 PM   Dressing Changed Changed/New 6/13/2019  1:28 PM   Wound Cleansed Wound cleanser 6/13/2019  1:27 PM   Wound Length (cm) 2.7 cm 6/13/2019  1:27 PM   Wound Width (cm) 2.4 cm 6/13/2019  1:27 PM Wound Depth (cm) 0.1 cm 6/13/2019  1:27 PM   Wound Surface Area (cm^2) 6.48 cm^2 6/13/2019  1:27 PM   Wound Volume (cm^3) 0.65 cm^3 6/13/2019  1:27 PM   Distance Tunneling (cm) 0 cm 6/13/2019  1:27 PM   Tunneling Position ___ O'Clock 0 6/13/2019  1:27 PM   Undermining Starts ___ O'Clock 0 6/13/2019  1:27 PM   Undermining Ends___ O'Clock 0 6/13/2019  1:27 PM   Undermining Maxium Distance (cm) 0 6/13/2019  1:27 PM   Wound Assessment Red 6/13/2019  1:27 PM   Drainage Amount Moderate 6/13/2019  1:27 PM   Drainage Description Serosanguinous 6/13/2019  1:27 PM   Odor None 6/13/2019  1:27 PM   Margins Defined edges 6/13/2019  1:27 PM   Supriya-wound Assessment Pink 6/13/2019  1:27 PM   Non-staged Wound Description Full thickness 6/13/2019  1:27 PM   [place]%Wound Bed 0 6/13/2019  1:27 PM   Red%Wound Bed 100 6/13/2019  1:27 PM   Yellow%Wound Bed 0 6/13/2019  1:27 PM   Black%Wound Bed 0 6/13/2019  1:27 PM   Purple%Wound Bed 0 6/13/2019  1:27 PM   Other%Wound Bed 0 6/13/2019  1:27 PM   Number of days: 0       Percent of Wound(s) Debrided: approximately 100%    Total  Area  Debrided:  6.48 sq cm     Bleeding:  Minimal    Hemostasis Achieved:  by pressure    Procedural Pain:  0  / 10     Post Procedural Pain:  0 / 10     Response to treatment:  Well tolerated by patient. Status of wound progress and description from last visit:   Wounds 12 and 13 are improving but she has a new area to the right posterior leg today. Plan:       Discharge Instructions         Plan:         Discharge Instructions        PHYSICIAN ORDERS AND DISCHARGE INSTRUCTIONS     [facility]     NOTE: Upon discharge from the [address], you will receive a patient experience survey.  We would be grateful if you would take the time to fill this survey out.     Wound care order history:                               Vascular studies: ARTERIAL STUDIES COMPLETE  Date 2/22/2018 AND 4/16/18-   [facility] SCHEDULED FOR 7/2/2018                                                     Imaging:   Date               Cultures:   Date 5/3/18 CULTURE OBTAINED; 6/14/18-wound culture postivie scanty growth MRSA-discussed with patient and son-doxycyline RX                                  6/38/91: Culture of left lower leg medial wound obtained      1/17/19 CULTURE OBTAINED    2/14/19 CULTURE OBTAINED              Labs/ HbA1c:   Date               Grafts: 8/30/2018: Puraply #1 Lot #-[id number]                                              Puraply #2 Lot #-[id number]. 1.1B                                              Puraply #3 Lot # [id number]. 1.1B                                              Puraply # 4 Lot #: [id number]. 1.1B                                              Puraply #5 (2x2) applied                                              Nushield # 1 (2x3) applied on 10/11/18                                               [place]ushield #2 (2x3) applied on 10/18/18                                              Affinity #1 (2.5x2.5) applied on 11/8/18                                              Affinity #2 (2.5x2.5) applied on 11/16/18                                              Affinity #3 (2.5 x 2.5) applied 11/29/18                  HBO:                 Antibiotics:  10/18/18 Doxycycline 100 mg PO BID x 10 days called to [address];  1/7/19 CLINDAMYCIN ORDERED FROM PCP              Earlier Wound care treatments:  Santyl ordered              Authorizations:                        Consults: DR Narciso Muhammad care physician: DR Nader Busch     Continuing wound care orders and information:              Residence: HOME              Continue home health care [address]               Your wound-care supplies will be provided by: [facility] provider: [address] [place] [address] with  Devante Nelson loading:  Date               [facility] Medications: RX BACTROBAN 2% 22 GRAM TUBE APPLY TO WOUND BED DAILY. REFILL 0 (CALLED INTO [facility])        Wound cleansing:                           SQ not scrub or use excessive force.                          Wash hands with soap and water before and after dressing changes.                         Prior to applying a clean dressing, cleanse wound with normal saline,                                wound cleanser, or mild soap and water.                           Ask the physician or nurse before getting the wound(s) wet in a shower        Daily Wound management:                          Keep weight off wounds and reposition every 2 hours.                          Avoid standing for long periods of time.                          Apply wraps/stockings in AM and remove at bedtime.                          If swelling is present, elevate legs to the level of the heart or above for 30  minutes 4-5 times a day and/or when sitti   ng.                                                  When taking antibiotics take entire prescription as ordered by physician do not stop taking until medicine is all gone.                             Orders for this week:  6/13/2019     LEFT POSTERIOR LEG/ right anterior/ posterior leg --  CLEANSE WITH NORMAL SALINE PAT DRY.  Apply mepitel to wound bed, then use damp hydra fera blue to wound bed, cover with abd, wrap with conform and tape . Change every other day. ((make sure to moisten dressing with saline prior to removing))) . LOTION INTACT SKIN. APPLY TUBIGRIP D IN THE AM AND OFF IN THE PM.       Follow up with Gabi Roy NP in 1 week  in the wound care center.     Call [phone] for any questions or concerns.     Date__________   Time____________                                                           Treatment Note Wound 05/02/19 Leg Proximal;Right; Anterior #13-Dressing/Treatment: (Mepitel. hydrofrea blue, abd, conform, tubi )  Wound 02/05/19 #12 LEFT POSTERIOR LEG (ONSET TWO DAYS AGO) VENOUS-Dressing/Treatment: (Mepitel. hydrofrea blue, abd, conform, tubi )  Wound 06/13/19 Wound #14 Right Post. Leg (Onset x 1 Week)-Dressing/Treatment: (Mepitel. hydrofrea blue, abd, conform, tubi )    Written Patient Dismissal Instructions Given            Electronically signed by LES Quezada CNP on 6/13/2019 at 1:34 PM

## 2019-06-21 ENCOUNTER — ANTI-COAG VISIT (OUTPATIENT)
Dept: INTERNAL MEDICINE CLINIC | Age: 84
End: 2019-06-21

## 2019-06-21 LAB — INR BLD: 1.7

## 2019-06-24 DIAGNOSIS — D62 ACUTE BLOOD LOSS ANEMIA: ICD-10-CM

## 2019-06-24 RX ORDER — LANOLIN ALCOHOL/MO/W.PET/CERES
325 CREAM (GRAM) TOPICAL
Qty: 90 TABLET | Refills: 1 | Status: SHIPPED | OUTPATIENT
Start: 2019-06-24 | End: 2019-10-11 | Stop reason: SDUPTHER

## 2019-06-25 ENCOUNTER — TELEPHONE (OUTPATIENT)
Dept: INTERNAL MEDICINE CLINIC | Age: 84
End: 2019-06-25

## 2019-06-25 DIAGNOSIS — R05.9 COUGH: Primary | ICD-10-CM

## 2019-06-25 RX ORDER — AZITHROMYCIN 250 MG/1
250 TABLET, FILM COATED ORAL SEE ADMIN INSTRUCTIONS
Qty: 6 TABLET | Refills: 0 | Status: SHIPPED | OUTPATIENT
Start: 2019-06-25 | End: 2019-06-30

## 2019-06-25 NOTE — TELEPHONE ENCOUNTER
Zina Allen from home care called and stated that pt has a non productive cough with no fever, nausea, or chills. Okay per Dr. Kathy Jennings to send in z-pack for pt.  Zina Allen aware and let pt son know to pick it up

## 2019-06-25 NOTE — TELEPHONE ENCOUNTER
z-pack can cause thinning of blood. [employer] and she will go out in one week to do a pro time. Verbal was given to chani.  Pt family aware that meds are okay to take

## 2019-06-27 ENCOUNTER — HOSPITAL ENCOUNTER (OUTPATIENT)
Dept: WOUND CARE | Age: 84
Discharge: HOME | End: 2019-06-27
Payer: MEDICARE

## 2019-06-27 VITALS
DIASTOLIC BLOOD PRESSURE: 70 MMHG | HEART RATE: 87 BPM | SYSTOLIC BLOOD PRESSURE: 179 MMHG | TEMPERATURE: 97.2 F | RESPIRATION RATE: 16 BRPM

## 2019-06-27 DIAGNOSIS — L97.912 TRAUMATIC ULCER OF RIGHT LOWER EXTREMITY WITH FAT LAYER EXPOSED (HCC): ICD-10-CM

## 2019-06-27 DIAGNOSIS — I87.313 CHRONIC VENOUS HYPERTENSION (IDIOPATHIC) WITH ULCER OF BILATERAL LOWER EXTREMITY (CODE) (HCC): ICD-10-CM

## 2019-06-27 DIAGNOSIS — L97.922 TRAUMATIC ULCER OF LEFT LOWER LEG WITH FAT LAYER EXPOSED (HCC): Primary | ICD-10-CM

## 2019-06-27 PROCEDURE — 11042 DBRDMT SUBQ TIS 1ST 20SQCM/<: CPT

## 2019-06-27 NOTE — PLAN OF CARE
Problem: Wound:  Goal: Will show signs of wound healing; wound closure and no evidence of infection  Description  Will show signs of wound healing; wound closure and no evidence of infection   Outcome: Ongoing  Note:   See Flowsheet     Problem: Wound:  Intervention: Assess pain status  Note:   See Flowsheet  Intervention: Assess wound size, appearance and drainage  Note:   See Flowsheet  Intervention: Assess pedal pulses bilaterally if patient has a foot or leg ulcer  Note:   See Flowsheet  Intervention: Doppler if unable to palpate pedal pulse  Note:   See Flowsheet

## 2019-06-27 NOTE — PROGRESS NOTES
Wound Care Center Progress Note With Procedure    Bishnu Perez  AGE: 80 y.o. GENDER: female  : 1922  EPISODE DATE:  2019     Subjective:     Chief Complaint   Patient presents with    Wound Check     Right and Left Lower Leg         HISTORY of PRESENT ILLNESS      Bishnu Perez is a 80 y.o. female who presents today for wound evaluation of Chronic venous and traumatic ulcer(s) of the bilateral lower legs. The ulcer is of moderate severity. The underlying cause of the wound is venous and trauma.    Wound Pain Timing/Severity: intermittent  Quality of pain: aching  Severity of pain:  2  10   Modifying Factors: edema and venous stasis  Associated Signs/Symptoms: none        PAST MEDICAL HISTORY        Diagnosis Date    AAA (abdominal aortic aneurysm) ([address]     DISCUSSED W PT;/FAMILY 2019- NO FURTHER INTERVENTION PLANNED AS POOR CANDIDATE    Basosquamous carcinoma     L leg - Dr Alondra Rowland COPD (chronic obstructive pulmonary disease) ([address]     CVA (cerebral vascular accident) ([address]     L parietal w speech deficit  ~2000, sx resolved    Gastro-esophageal reflux disease with esophagitis     Gout     hand swelling and pain    Hyperlipidemia     Non-healing surgical wound     Osteoarthritis     Osteoarthritis, generalized     on chr pred started by Rheum    Peripheral vascular disease ([address] 2008, 2008    S/P PTA and stents X 2 to Right leg- Dr Luis Correa Thoracic aortic aneurysm ([address]     DISCUSSED W PT;/FAMILY 2019- NO FURTHER INTERVENTION PLANNED AS POOR CANDIDATE    Venous stasis ulcer ([address] 2012       PAST SURGICAL HISTORY    Past Surgical History:   Procedure Laterality Date    APPENDECTOMY      ARTERY SURGERY      STENT TO LEFT FEMORIAL AND ALSO TO RIGHT SUPRA FEMORIAL ARTERY    FRACTURE SURGERY      Left ???   -ORIF    HYSTERECTOMY, TOTAL ABDOMINAL  1949    age 27-for fibroids    LEG SURGERY      2008-Right leg- PTA and Stent for PVD - Dr Laila Guzman: 2008- Right leg -PTA and Stent- Dr Nicklas Bence  12    L basosquamous cell CA L leg    VASCULAR SURGERY         FAMILY HISTORY    Family History   Problem Relation Age of Onset    Cancer Mother     Early Death Mother     Hearing Loss Mother     Cancer Sister        SOCIAL HISTORY    Social History     Tobacco Use    Smoking status: Former Smoker     Packs/day: 0.00     Last attempt to quit: 2000     Years since quittin.4    Smokeless tobacco: Former User    Tobacco comment: Prior Hx of 1 Pack per week, none currently   Substance Use Topics    Alcohol use: Yes     Comment: pt states \"one drink a day\"    Drug use: No       ALLERGIES    Allergies   Allergen Reactions    Lisinopril      Cough      Sulfa Antibiotics     Pcn [Penicillins] Rash     Does tolerate keflex       MEDICATIONS    Current Outpatient Medications on File Prior to Encounter   Medication Sig Dispense Refill    azithromycin (ZITHROMAX) 250 MG tablet Take 1 tablet by mouth See Admin Instructions for 5 days 500mg on day 1 followed by 250mg on days 2 - 5 6 tablet 0    ferrous sulfate 325 (65 Fe) MG EC tablet Take 1 tablet by mouth daily (with breakfast) 90 tablet 1    warfarin (COUMADIN) 2 MG tablet Take 1 tablet by mouth daily Take 2mg every day except 1mg on Sundays 90 tablet 1    hydrochlorothiazide (HYDRODIURIL) 12.5 MG tablet Take 1 tablet by mouth See Admin Instructions Take 1 tablet  48 tablet 3    cyclobenzaprine (FLEXERIL) 5 MG tablet Take 1 tablet by mouth every evening 90 tablet 1    lovastatin (MEVACOR) 40 MG tablet Take 1 tablet by mouth 2 times daily TAKE ONE (1) TABLET BY MOUTH TWO TIMES DAILY jscript:void(0) 180 tablet 1    ranitidine (ZANTAC) 150 MG capsule Take 1 capsule by mouth 2 times daily 180 capsule 1    hydrALAZINE (APRESOLINE) 25 MG tablet Take 1 tablet by mouth 3 times daily 90 tablet 5    losartan (COZAAR) 25 MG tablet Take 1 tablet by mouth daily 90 tablet 1    clopidogrel (PLAVIX) 75 MG tablet Take 1 tablet by mouth every other day 45 tablet 3    meclizine (TRAVEL SICKNESS) 25 MG CHEW Take 1 tablet by mouth every 6 hours as needed (dizziness/vertigo) 90 tablet 1    acetaminophen (TYLENOL) 325 MG tablet Take 2 tablets by mouth every 4 hours as needed for Pain 120 tablet 3     No current facility-administered medications on file prior to encounter. REVIEW OF SYSTEMS    Pertinent items are noted in HPI. Constitutional: Negative for systemic symptoms including fever, chills and malaise. Objective:      BP (!) 179/70   Pulse 87   Temp 97.2 °F (36.2 °C) (Temporal)   Resp 16   LMP  (LMP Unknown)     PHYSICAL EXAM    General: The patient is in no acute distress. Mental status:  Patient is appropriate, is  oriented to place and plan of care. Dermatologic exam: Visual inspection of the periwound reveals the skin to be normal in turgor and texture  Wound exam: see wound description below in procedure note      Assessment:     Problem List Items Addressed This Visit     WD-Chronic venous hypertension (idiopathic) with ulcer of bilateral lower extremity (CODE) ([address]    WD-Traumatic ulcer of left lower leg with fat layer exposed ([address] - Primary    WD-Traumatic ulcer of right lower extremity with fat layer exposed ([address]        Procedure Note    Indications:  Based on my examination of this patient's wound(s) today, sharp excision into necrotic subcutaneous tissue is required to promote healing and evaluate the extent of previous healing. Performed by: LES Rodriguez - CNP    Consent obtained: Yes    Time out taken:  Yes    Pain Control: Anesthetic  Anesthetic: 4% Lidocaine Liquid Topical(10 ml)     Debridement:Excisional Debridement    Using #15 blade scalpel the wound(s) was/were sharply debrided down through and including the removal of subcutaneous tissue.         Devitalized Tissue Debrided:  biofilm and exudate    Pre Debridement Measurements:  Are located in the Wound Documentation Flow Sheet    All active wounds listed below with today's date are evaluated  Wound(s)    debrided this date include # : 12 and 14     Post  Debridement Measurements:  Wound 02/05/19 #12 LEFT POSTERIOR LEG (ONSET TWO DAYS AGO) VENOUS (Active)   Wound Image   6/27/2019  1:22 PM   Wound Venous 6/13/2019 12:58 PM   Dressing Status Clean;Dry; Intact 6/27/2019  2:08 PM   Dressing Changed Changed/New 6/27/2019  2:08 PM   Wound Cleansed Rinsed/Irrigated with saline 6/27/2019  1:22 PM   Wound Length (cm) 3 cm 6/27/2019  1:22 PM   Wound Width (cm) 1.5 cm 6/27/2019  1:22 PM   Wound Depth (cm) 0.1 cm 6/27/2019  1:22 PM   Wound Surface Area (cm^2) 4.5 cm^2 6/27/2019  1:22 PM   Change in Wound Size % (l*w) 86.92 6/27/2019  1:22 PM   Wound Volume (cm^3) 0.45 cm^3 6/27/2019  1:22 PM   Wound Healing % 93 6/27/2019  1:22 PM   Post-Procedure Length (cm) 3 cm 6/27/2019  1:44 PM   Post-Procedure Width (cm) 1.5 cm 6/27/2019  1:44 PM   Post-Procedure Depth (cm) 0.1 cm 6/27/2019  1:44 PM   Post-Procedure Surface Area (cm^2) 4.5 cm^2 6/27/2019  1:44 PM   Post-Procedure Volume (cm^3) 0.45 cm^3 6/27/2019  1:44 PM   Distance Tunneling (cm) 0 cm 6/27/2019  1:22 PM   Tunneling Position ___ O'Clock 0 6/27/2019  1:22 PM   Undermining Starts ___ O'Clock 0 6/27/2019  1:22 PM   Undermining Ends___ O'Clock 0 6/27/2019  1:22 PM   Undermining Maxium Distance (cm) 0 6/27/2019  1:22 PM   Wound Assessment Red 6/27/2019  1:22 PM   Drainage Amount Moderate 6/27/2019  1:22 PM   Drainage Description Serosanguinous 6/27/2019  1:22 PM   Odor None 6/27/2019  1:22 PM   Margins Defined edges 6/27/2019  1:22 PM   Supriya-wound Assessment Dry 6/27/2019  1:22 PM   Non-staged Wound Description Full thickness 6/27/2019  1:22 PM   [place]%Wound Bed 0 6/27/2019  1:22 PM   Red%Wound Bed 100 6/27/2019  1:22 PM   Yellow%Wound Bed 0 6/27/2019  1:22 PM   Black%Wound Bed 0 6/27/2019  1:22 PM   Purple%Wound Bed 0 6/27/2019  1:22 PM Other%Wound Bed 0 6/27/2019  1:22 PM   Culture Taken Yes 2/14/2019  1:31 PM   Number of days: 141       Wound 06/13/19 Wound #14 Right Post. Leg (Onset x 1 Week) (Active)   Wound Image   6/27/2019  1:22 PM   Dressing Status Clean;Dry; Intact 6/27/2019  2:08 PM   Dressing Changed Changed/New 6/27/2019  2:08 PM   Wound Cleansed Rinsed/Irrigated with saline 6/27/2019  1:22 PM   Wound Length (cm) 2.3 cm 6/27/2019  1:22 PM   Wound Width (cm) 2.3 cm 6/27/2019  1:22 PM   Wound Depth (cm) 0.1 cm 6/27/2019  1:22 PM   Wound Surface Area (cm^2) 5.29 cm^2 6/27/2019  1:22 PM   Change in Wound Size % (l*w) 18.36 6/27/2019  1:22 PM   Wound Volume (cm^3) 0.53 cm^3 6/27/2019  1:22 PM   Wound Healing % 18 6/27/2019  1:22 PM   Post-Procedure Length (cm) 2.3 cm 6/27/2019  1:44 PM   Post-Procedure Width (cm) 2.3 cm 6/27/2019  1:44 PM   Post-Procedure Depth (cm) 0.1 cm 6/27/2019  1:44 PM   Post-Procedure Surface Area (cm^2) 5.29 cm^2 6/27/2019  1:44 PM   Post-Procedure Volume (cm^3) 0.53 cm^3 6/27/2019  1:44 PM   Distance Tunneling (cm) 0 cm 6/27/2019  1:22 PM   Tunneling Position ___ O'Clock 0 6/27/2019  1:22 PM   Undermining Starts ___ O'Clock 0 6/27/2019  1:22 PM   Undermining Ends___ O'Clock 0 6/27/2019  1:22 PM   Undermining Maxium Distance (cm) 0 6/27/2019  1:22 PM   Wound Assessment Red 6/27/2019  1:22 PM   Drainage Amount Moderate 6/27/2019  1:22 PM   Drainage Description Serosanguinous 6/27/2019  1:22 PM   Odor None 6/27/2019  1:22 PM   Margins Defined edges 6/27/2019  1:22 PM   Supriya-wound Assessment Pink 6/27/2019  1:22 PM   Non-staged Wound Description Full thickness 6/27/2019  1:22 PM   [place]%Wound Bed 0 6/27/2019  1:22 PM   Red%Wound Bed 100 6/27/2019  1:22 PM   Yellow%Wound Bed 0 6/27/2019  1:22 PM   Black%Wound Bed 0 6/27/2019  1:22 PM   Purple%Wound Bed 0 6/27/2019  1:22 PM   Other%Wound Bed 0 6/27/2019  1:22 PM   Number of days: 14       Percent of Wound(s) Debrided: approximately 100%    Total  Area  Debrided:  9.79 sq 11/16/18                                              Affinity #3 (2.5 x 2.5) applied 11/29/18                  HBO:                 Antibiotics:  10/18/18 Doxycycline 100 mg PO BID x 10 days called to [facility];  1/7/19 CLINDAMYCIN ORDERED FROM PCP              Earlier Wound care treatments:  Santyl ordered              Authorizations:                        Consults: DR Vernel Hatchet care physician: DR Kip Araujo     Continuing wound care orders and information:              Residence: HOME              Continue home health care [address]               Your wound-care supplies will be provided by: [facility] provider: [facility] with  Vero Boothe loading:  Date               [facility] Medications: RX BACTROBAN 2% 22 GRAM TUBE APPLY TO WOUND BED DAILY. REFILL 0 (CALLED INTO [facility])        Wound cleansing:                           UD not scrub or use excessive force.                          Wash hands with soap and water before and after dressing changes.                           Prior to applying a clean dressing, cleanse wound with normal saline,                                wound cleanser, or mild soap and water.                           Ask the physician or nurse before getting the wound(s) wet in a shower        Daily Wound management:                          Keep weight off wounds and reposition every 2 hours.                          Avoid standing for long periods of time.                          OMVXM wraps/stockings in AM and remove at bedtime.                          If swelling is present, elevate legs to the level of the heart or above for 30  minutes 4-5 times a day and/or when sitti   ng.                                                  When taking antibiotics take entire prescription as ordered by physician do not stop taking until medicine is all gone.                             Orders for this week:  6/27/2019     LEFT POSTERIOR LEG/ RIGHT posterior leg --  CLEANSE WITH NORMAL SALINE PAT DRY.  Apply mepitel to wound bed, then use damp hydra fera blue to wound bed, cover with abd, wrap with conform and tape . Change every other day. ((make sure to moisten dressing with saline prior to removing))) . LOTION INTACT SKIN.  APPLY TUBIGRIP D IN THE AM AND OFF IN THE PM.       Follow up with Robert Phan NP in 2 weeks  in the wound care center.     Call [phone] for any questions or concerns.     Date__________   Time____________                          Treatment Note Wound 06/13/19 Wound #14 Right Post. Leg (Onset x 1 Week)-Dressing/Treatment: (Mepitel, HFB, ABD, Conform Tape, Tubi D)  Wound 02/05/19 #12 LEFT POSTERIOR LEG (ONSET TWO DAYS AGO) VENOUS-Dressing/Treatment: (Mepitel, HFB, ABD, Conform Tape, Tubi D)    Written Patient Dismissal Instructions Given            Electronically signed by LES Cannon CNP on 6/27/2019 at 2:16 PM

## 2019-07-01 ENCOUNTER — ANTI-COAG VISIT (OUTPATIENT)
Dept: INTERNAL MEDICINE CLINIC | Age: 84
End: 2019-07-01

## 2019-07-01 DIAGNOSIS — I10 ESSENTIAL HYPERTENSION: ICD-10-CM

## 2019-07-01 LAB — INR BLD: 3.1

## 2019-07-01 RX ORDER — LOSARTAN POTASSIUM 25 MG/1
25 TABLET ORAL DAILY
Qty: 90 TABLET | Refills: 1 | Status: SHIPPED | OUTPATIENT
Start: 2019-07-01 | End: 2019-08-12

## 2019-07-10 DIAGNOSIS — R60.9 PERIPHERAL EDEMA: ICD-10-CM

## 2019-07-10 RX ORDER — HYDROCHLOROTHIAZIDE 12.5 MG/1
12.5 TABLET ORAL SEE ADMIN INSTRUCTIONS
Qty: 48 TABLET | Refills: 3 | Status: SHIPPED | OUTPATIENT
Start: 2019-07-10 | End: 2019-08-12 | Stop reason: SDUPTHER

## 2019-07-11 ENCOUNTER — HOSPITAL ENCOUNTER (OUTPATIENT)
Dept: WOUND CARE | Age: 84
Discharge: HOME | End: 2019-07-11
Payer: MEDICARE

## 2019-07-11 VITALS
HEART RATE: 87 BPM | TEMPERATURE: 97.7 F | RESPIRATION RATE: 16 BRPM | DIASTOLIC BLOOD PRESSURE: 48 MMHG | SYSTOLIC BLOOD PRESSURE: 115 MMHG

## 2019-07-11 DIAGNOSIS — I87.313 CHRONIC VENOUS HYPERTENSION (IDIOPATHIC) WITH ULCER OF BILATERAL LOWER EXTREMITY (CODE) (HCC): ICD-10-CM

## 2019-07-11 DIAGNOSIS — L97.922 TRAUMATIC ULCER OF LEFT LOWER LEG WITH FAT LAYER EXPOSED (HCC): Primary | ICD-10-CM

## 2019-07-11 DIAGNOSIS — L97.912 TRAUMATIC ULCER OF RIGHT LOWER EXTREMITY WITH FAT LAYER EXPOSED (HCC): ICD-10-CM

## 2019-07-11 PROCEDURE — 99213 OFFICE O/P EST LOW 20 MIN: CPT

## 2019-07-11 PROCEDURE — 99212 OFFICE O/P EST SF 10 MIN: CPT | Performed by: NURSE PRACTITIONER

## 2019-07-11 RX ORDER — LIDOCAINE HYDROCHLORIDE 40 MG/ML
SOLUTION TOPICAL ONCE
Status: DISCONTINUED | OUTPATIENT
Start: 2019-07-11 | End: 2019-07-12 | Stop reason: HOSPADM

## 2019-07-11 NOTE — PROGRESS NOTES
Wound Care Center Progress Note       Aparna Hernandez  AGE: 80 y.o. GENDER: female  : 1922  TODAY'S DATE:  2019        Subjective:     Chief Complaint   Patient presents with    Wound Check     bilateral legs          HISTORY of PRESENT ILLNESS     Aparna Hernandez is a 80 y.o. female who presents today for wound evaluation of Chronic venous and traumatic ulcer(s) of bilateral lower legs. The ulcer is of moderate severity. The underlying cause of the wound is venous and trauma.    Wound Pain Timing/Severity: intermittent  Quality of pain: aching  Severity of pain:  2 / 10   Modifying Factors: edema and venous stasis  Associated Signs/Symptoms: none        PAST MEDICAL HISTORY        Diagnosis Date    AAA (abdominal aortic aneurysm) ([address]     DISCUSSED W PT;/FAMILY 2019- NO FURTHER INTERVENTION PLANNED AS POOR CANDIDATE    Basosquamous carcinoma     L leg - Dr Eliseo Driver COPD (chronic obstructive pulmonary disease) ([address]     CVA (cerebral vascular accident) ([address]     L parietal w speech deficit  ~2000, sx resolved    Gastro-esophageal reflux disease with esophagitis     Gout     hand swelling and pain    Hyperlipidemia     Non-healing surgical wound     Osteoarthritis     Osteoarthritis, generalized     on chr pred started by Rheum    Peripheral vascular disease ([address] 2008, 2008    S/P PTA and stents X 2 to Right leg- Dr Crisostomo Mater Thoracic aortic aneurysm ([address]     DISCUSSED W PT;/FAMILY 2019- NO FURTHER INTERVENTION PLANNED AS POOR CANDIDATE    Venous stasis ulcer ([address] 2012       PAST SURGICAL HISTORY    Past Surgical History:   Procedure Laterality Date    APPENDECTOMY      ARTERY SURGERY      STENT TO LEFT FEMORIAL AND ALSO TO RIGHT SUPRA FEMORIAL ARTERY    FRACTURE SURGERY      Left ???   -ORIF    HYSTERECTOMY, TOTAL ABDOMINAL  194    age 27-for fibroids    LEG SURGERY      2008-Right leg- PTA and Stent for PVD - Dr Lutz Mantle:   2008- Right leg -PTA and Stent- Dr Naseem Cleveland  12    L basosquamous cell CA L leg    VASCULAR SURGERY         FAMILY HISTORY    Family History   Problem Relation Age of Onset    Cancer Mother     Early Death Mother     Hearing Loss Mother     Cancer Sister        SOCIAL HISTORY    Social History     Tobacco Use    Smoking status: Former Smoker     Packs/day: 0.00     Last attempt to quit: 2000     Years since quittin.4    Smokeless tobacco: Former User    Tobacco comment: Prior Hx of 1 Pack per week, none currently   Substance Use Topics    Alcohol use: Yes     Comment: pt states \"one drink a day\"    Drug use: No       ALLERGIES    Allergies   Allergen Reactions    Lisinopril      Cough      Sulfa Antibiotics     Pcn [Penicillins] Rash     Does tolerate keflex       MEDICATIONS    Current Outpatient Medications on File Prior to Encounter   Medication Sig Dispense Refill    hydrochlorothiazide (HYDRODIURIL) 12.5 MG tablet Take 1 tablet by mouth See Admin Instructions Take 1 tablet  48 tablet 3    losartan (COZAAR) 25 MG tablet Take 1 tablet by mouth daily 90 tablet 1    ferrous sulfate 325 (65 Fe) MG EC tablet Take 1 tablet by mouth daily (with breakfast) 90 tablet 1    warfarin (COUMADIN) 2 MG tablet Take 1 tablet by mouth daily Take 2mg every day except 1mg on Sundays 90 tablet 1    cyclobenzaprine (FLEXERIL) 5 MG tablet Take 1 tablet by mouth every evening 90 tablet 1    meclizine (TRAVEL SICKNESS) 25 MG CHEW Take 1 tablet by mouth every 6 hours as needed (dizziness/vertigo) 90 tablet 1    lovastatin (MEVACOR) 40 MG tablet Take 1 tablet by mouth 2 times daily TAKE ONE (1) TABLET BY MOUTH TWO TIMES DAILY jscript:void(0) 180 tablet 1    ranitidine (ZANTAC) 150 MG capsule Take 1 capsule by mouth 2 times daily 180 capsule 1    hydrALAZINE (APRESOLINE) 25 MG tablet Take 1 tablet by mouth 3 times daily 90 tablet 5    clopidogrel (PLAVIX) 75 MG tablet Take 1 tablet by mouth every other Ends___ O'Clock 0 7/11/2019  1:05 PM   Undermining Maxium Distance (cm) 0 7/11/2019  1:05 PM   Wound Assessment Red 7/11/2019  1:05 PM   Drainage Amount Moderate 7/11/2019  1:05 PM   Drainage Description Serosanguinous 7/11/2019  1:05 PM   Odor None 7/11/2019  1:05 PM   Margins Defined edges 7/11/2019  1:05 PM   Supriya-wound Assessment Dry 7/11/2019  1:05 PM   Non-staged Wound Description Full thickness 7/11/2019  1:05 PM   [place]%Wound Bed 0 7/11/2019  1:05 PM   Red%Wound Bed 100 7/11/2019  1:05 PM   Yellow%Wound Bed 0 7/11/2019  1:05 PM   Black%Wound Bed 0 7/11/2019  1:05 PM   Purple%Wound Bed 0 7/11/2019  1:05 PM   Other%Wound Bed 0 6/27/2019  1:22 PM   Culture Taken Yes 2/14/2019  1:31 PM   Number of days: 155       Wound 06/13/19 Wound #14 Right Post. Leg (Onset x 1 Week)- venous (Active)   Wound Image   7/11/2019  1:05 PM   Wound Venous 7/11/2019  1:05 PM   Dressing Status Clean;Dry; Intact 7/11/2019  1:43 PM   Dressing Changed Changed/New 7/11/2019  1:43 PM   Wound Cleansed Rinsed/Irrigated with saline 7/11/2019  1:05 PM   Wound Length (cm) 2 cm 7/11/2019  1:05 PM   Wound Width (cm) 1.5 cm 7/11/2019  1:05 PM   Wound Depth (cm) 0.1 cm 7/11/2019  1:05 PM   Wound Surface Area (cm^2) 3 cm^2 7/11/2019  1:05 PM   Change in Wound Size % (l*w) 53.7 7/11/2019  1:05 PM   Wound Volume (cm^3) 0.3 cm^3 7/11/2019  1:05 PM   Wound Healing % 54 7/11/2019  1:05 PM   Post-Procedure Length (cm) 2.3 cm 6/27/2019  1:44 PM   Post-Procedure Width (cm) 2.3 cm 6/27/2019  1:44 PM   Post-Procedure Depth (cm) 0.1 cm 6/27/2019  1:44 PM   Post-Procedure Surface Area (cm^2) 5.29 cm^2 6/27/2019  1:44 PM   Post-Procedure Volume (cm^3) 0.53 cm^3 6/27/2019  1:44 PM   Distance Tunneling (cm) 0 cm 7/11/2019  1:05 PM   Tunneling Position ___ O'Clock 0 7/11/2019  1:05 PM   Undermining Starts ___ O'Clock 0 7/11/2019  1:05 PM   Undermining Ends___ O'Clock 0 7/11/2019  1:05 PM   Undermining Maxium Distance (cm) 0 7/11/2019  1:05 PM   Wound Assessment Red 7/11/2019  1:05 PM   Drainage Amount Small 7/11/2019  1:05 PM   Drainage Description Serosanguinous 7/11/2019  1:05 PM   Odor None 7/11/2019  1:05 PM   Margins Defined edges 7/11/2019  1:05 PM   Supriya-wound Assessment Pink 7/11/2019  1:05 PM   Non-staged Wound Description Full thickness 7/11/2019  1:05 PM   [place]%Wound Bed 0 7/11/2019  1:05 PM   Red%Wound Bed 100 7/11/2019  1:05 PM   Yellow%Wound Bed 0 7/11/2019  1:05 PM   Black%Wound Bed 00 7/11/2019  1:05 PM   Purple%Wound Bed 0 7/11/2019  1:05 PM   Other%Wound Bed 0 7/11/2019  1:05 PM   Number of days: 28       Assessment:     Problem List Items Addressed This Visit     WD-Chronic venous hypertension (idiopathic) with ulcer of bilateral lower extremity (CODE) ([address]    WD-Traumatic ulcer of left lower leg with fat layer exposed ([address] - Primary    WD-Traumatic ulcer of right lower extremity with fat layer exposed ([address]          Status of wound progress and description from last visit:   improving. Plan:     Discharge Instructions         Plan:         Discharge Instructions        PHYSICIAN ORDERS AND DISCHARGE INSTRUCTIONS     FAX TO [facility]     NOTE: Upon discharge from the [address], you will receive a patient experience survey.  We would be grateful if you would take the time to fill this survey out.     Wound care order history:                               Vascular studies: ARTERIAL STUDIES COMPLETE  Date 2/22/2018 AND 4/16/18-                                             VASCULAR  INTERVENTION SCHEDULED FOR 7/2/2018                                                     Imaging:   Date               Cultures:   Date 5/3/18 CULTURE OBTAINED; 6/14/18-wound culture postivie scanty growth MRSA-discussed with patient and son-doxycyline RX                                  5/18/16: Culture of left lower leg medial wound obtained      1/17/19 CULTURE OBTAINED    2/14/19 CULTURE OBTAINED              Labs/ HbA1c:   Date               Grafts: 8/30/2018: Puraply #1 Lot #[id number]                                              Puraply #2 Lot #-[id number]. 1.1B                                              Puraply #3 Lot # [id number]. 1.1B                                              Puraply # 4 Lot #: [id number]. 1.1B                                              Puraply #5 (2x2) applied                                              [place] # 1 (2x3) applied on 10/11/18                                               [place] #2 (2x3) applied on 10/18/18                                              Affinity #1 (2.5x2.5) applied on 11/8/18                                              Affinity #2 (2.5x2.5) applied on 11/16/18                                              Affinity #3 (2.5 x 2.5) applied 11/29/18                  HBO:                 Antibiotics:  10/18/18 Doxycycline 100 mg PO BID x 10 days called to [facility];  1/7/19 CLINDAMYCIN ORDERED FROM PCP              Earlier Wound care treatments:  Santyl ordered              Authorizations:                        Consults: DR Susy Parikh care physician: DR Timm Apley     Continuing wound care orders and information:              Residence: HOME              [facility] home health care [address] [place] [address]               Your wound-care supplies will be provided by: [facility] provider: [address] [place] [address] with  Cristian Mary loading:  Date               [facility] Medications: RX BACTROBAN 2% 22 GRAM TUBE APPLY TO WOUND BED DAILY. REFILL 0 (CALLED INTO [facility])        Wound cleansing:                           RO not scrub or use excessive force.                          Wash hands with soap and water before and after dressing changes.                           Prior to applying a clean dressing, cleanse wound with normal saline,                                wound cleanser, or mild soap and water.                           Ask the physician or nurse before

## 2019-07-22 ENCOUNTER — ANTI-COAG VISIT (OUTPATIENT)
Dept: INTERNAL MEDICINE CLINIC | Age: 84
End: 2019-07-22

## 2019-07-25 ENCOUNTER — HOSPITAL ENCOUNTER (OUTPATIENT)
Dept: WOUND CARE | Age: 84
Discharge: HOME | End: 2019-07-25
Payer: MEDICARE

## 2019-07-25 VITALS
DIASTOLIC BLOOD PRESSURE: 65 MMHG | RESPIRATION RATE: 16 BRPM | SYSTOLIC BLOOD PRESSURE: 115 MMHG | TEMPERATURE: 98 F | HEART RATE: 82 BPM

## 2019-07-25 DIAGNOSIS — L97.922 TRAUMATIC ULCER OF LEFT LOWER LEG WITH FAT LAYER EXPOSED (HCC): ICD-10-CM

## 2019-07-25 DIAGNOSIS — I87.313 CHRONIC VENOUS HYPERTENSION (IDIOPATHIC) WITH ULCER OF BILATERAL LOWER EXTREMITY (CODE) (HCC): ICD-10-CM

## 2019-07-25 DIAGNOSIS — L97.912 TRAUMATIC ULCER OF RIGHT LOWER EXTREMITY WITH FAT LAYER EXPOSED (HCC): Primary | ICD-10-CM

## 2019-07-25 PROCEDURE — 11042 DBRDMT SUBQ TIS 1ST 20SQCM/<: CPT

## 2019-07-25 PROCEDURE — 11042 DBRDMT SUBQ TIS 1ST 20SQCM/<: CPT | Performed by: NURSE PRACTITIONER

## 2019-07-25 NOTE — PROGRESS NOTES
Wound Care Center Progress Note With Procedure    Britton Morgan  AGE: 80 y.o. GENDER: female  : 1922  EPISODE DATE:  2019     Subjective:     Chief Complaint   Patient presents with    Wound Check     brandy legs         HISTORY of PRESENT ILLNESS      Britton Morgan is a 80 y.o. female who presents today for wound evaluation of Chronic venous and traumatic ulcer(s) of the bilateral lower legs. The ulcer is of mild severity. The underlying cause of the wound is venous and trauma. Right leg wound is healed today.   Wound Pain Timing/Severity: none  Quality of pain: N/A  Severity of pain:  0 / 10   Modifying Factors: edema and venous stasis  Associated Signs/Symptoms: none        PAST MEDICAL HISTORY        Diagnosis Date    AAA (abdominal aortic aneurysm) ([address]     DISCUSSED W PT;/FAMILY 2019- NO FURTHER INTERVENTION PLANNED AS POOR CANDIDATE    Basosquamous carcinoma     L leg - Dr Seun Hartley COPD (chronic obstructive pulmonary disease) ([address]     CVA (cerebral vascular accident) ([address]     L parietal w speech deficit  ~2000, sx resolved    Gastro-esophageal reflux disease with esophagitis     Gout     hand swelling and pain    Hyperlipidemia     Non-healing surgical wound     Osteoarthritis     Osteoarthritis, generalized     on chr pred started by Rheum    Peripheral vascular disease ([address] 2008, 2008    S/P PTA and stents X 2 to Right leg- Dr Alonzo Amel Thoracic aortic aneurysm ([address]     DISCUSSED W PT;/FAMILY 2019- NO FURTHER INTERVENTION PLANNED AS POOR CANDIDATE    Venous stasis ulcer ([address] 2012       PAST SURGICAL HISTORY    Past Surgical History:   Procedure Laterality Date    APPENDECTOMY      ARTERY SURGERY      STENT TO LEFT FEMORIAL AND ALSO TO RIGHT SUPRA FEMORIAL ARTERY    FRACTURE SURGERY      Left ???   -ORIF    HYSTERECTOMY, TOTAL ABDOMINAL  1949    age 27-for fibroids    LEG SURGERY      2008-Right leg- PTA and Stent for PVD - Dr Alana Tai:

## 2019-07-26 DIAGNOSIS — M15.9 OSTEOARTHRITIS, GENERALIZED: ICD-10-CM

## 2019-07-26 RX ORDER — PREDNISONE 1 MG/1
5 TABLET ORAL DAILY
Qty: 90 TABLET | Refills: 1 | Status: SHIPPED | OUTPATIENT
Start: 2019-07-26 | End: 2019-10-11 | Stop reason: SDUPTHER

## 2019-07-29 ENCOUNTER — TELEPHONE (OUTPATIENT)
Dept: INTERNAL MEDICINE CLINIC | Age: 84
End: 2019-07-29

## 2019-08-08 ENCOUNTER — HOSPITAL ENCOUNTER (OUTPATIENT)
Dept: WOUND CARE | Age: 84
Discharge: HOME | End: 2019-08-08
Payer: MEDICARE

## 2019-08-08 VITALS
SYSTOLIC BLOOD PRESSURE: 165 MMHG | RESPIRATION RATE: 18 BRPM | TEMPERATURE: 98.7 F | DIASTOLIC BLOOD PRESSURE: 74 MMHG | HEART RATE: 80 BPM

## 2019-08-08 DIAGNOSIS — S91.209A AVULSION OF TOENAIL, INITIAL ENCOUNTER: ICD-10-CM

## 2019-08-08 DIAGNOSIS — L97.922 TRAUMATIC ULCER OF LEFT LOWER LEG WITH FAT LAYER EXPOSED (HCC): ICD-10-CM

## 2019-08-08 DIAGNOSIS — I87.313 CHRONIC VENOUS HYPERTENSION (IDIOPATHIC) WITH ULCER OF BILATERAL LOWER EXTREMITY (CODE) (HCC): Primary | ICD-10-CM

## 2019-08-08 PROCEDURE — 11042 DBRDMT SUBQ TIS 1ST 20SQCM/<: CPT | Performed by: NURSE PRACTITIONER

## 2019-08-08 PROCEDURE — 11042 DBRDMT SUBQ TIS 1ST 20SQCM/<: CPT

## 2019-08-08 ASSESSMENT — PAIN SCALES - GENERAL: PAINLEVEL_OUTOF10: 0

## 2019-08-08 NOTE — PROGRESS NOTES
Wound Care Center Progress Note With Procedure    Manan Mendoza  AGE: 80 y.o. GENDER: female  : 1922  EPISODE DATE:  2019     Subjective:     Chief Complaint   Patient presents with    Wound Check     bilateral legs          HISTORY of PRESENT ILLNESS      Manan Mendoza is a 80 y.o. female who presents today for wound evaluation of Chronic venous ulcer(s) of the left lower leg. The ulcer is of mild severity. The underlying cause of the wound is CVI. Wound is nearly healed. It appears that she stubbed her left great toe at some point as the nail is almost completely avulsed. This nail was removed today with little effort.   Wound Pain Timing/Severity: none  Quality of pain: N/A  Severity of pain:  0 / 10   Modifying Factors: edema and venous stasis  Associated Signs/Symptoms: none        PAST MEDICAL HISTORY        Diagnosis Date    AAA (abdominal aortic aneurysm) ([address]     DISCUSSED W PT;/FAMILY 2019- NO FURTHER INTERVENTION PLANNED AS POOR CANDIDATE    Basosquamous carcinoma     L leg - Dr Darren William COPD (chronic obstructive pulmonary disease) ([address]     CVA (cerebral vascular accident) ([address]     L parietal w speech deficit  ~2000, sx resolved    Gastro-esophageal reflux disease with esophagitis     Gout     hand swelling and pain    Hyperlipidemia     Non-healing surgical wound     Osteoarthritis     Osteoarthritis, generalized     on chr pred started by Rheum    Peripheral vascular disease ([address] 2008, 2008    S/P PTA and stents X 2 to Right leg- Dr Pérez Pena Thoracic aortic aneurysm ([address]     DISCUSSED W PT;/FAMILY 2019- NO FURTHER INTERVENTION PLANNED AS POOR CANDIDATE    Venous stasis ulcer ([address] 2012       PAST SURGICAL HISTORY    Past Surgical History:   Procedure Laterality Date    APPENDECTOMY      ARTERY SURGERY      STENT TO LEFT FEMORIAL AND ALSO TO RIGHT SUPRA FEMORIAL ARTERY    FRACTURE SURGERY      Left ???   -ORIF    HYSTERECTOMY, TOTAL Taken Yes 2/14/2019  1:31 PM   Number of days: 183       Percent of Wound(s) Debrided: approximately 100%    Total  Area  Debrided:  0.25 sq cm     Bleeding:  Minimal    Hemostasis Achieved:  by pressure    Procedural Pain:  0  / 10     Post Procedural Pain:  0 / 10     Response to treatment:  Well tolerated by patient. Status of wound progress and description from last visit:   Nearly healed. Plan:       Discharge Instructions         Plan:         Discharge Instructions        PHYSICIAN ORDERS AND DISCHARGE INSTRUCTIONS     FAX TO [facility]     NOTE: Upon discharge from the [address], you will receive a patient experience survey. We would be grateful if you would take the time to fill this survey out.     Wound care order history:                               Vascular studies: ARTERIAL STUDIES COMPLETE  Date 2/22/2018 AND 4/16/18-                                             VASCULAR  INTERVENTION SCHEDULED FOR 7/2/2018                                                     Imaging:   Date               Cultures:   Date 5/3/18 CULTURE OBTAINED; 6/14/18-wound culture postivie scanty growth MRSA-discussed with patient and son-doxycyline RX                                  7/07/95: Culture of left lower leg medial wound obtained      1/17/19 CULTURE OBTAINED    2/14/19 CULTURE OBTAINED              Labs/ HbA1c:   Date               Grafts: 8/30/2018: Puraply #1 Lot #-[id number]                                              Puraply #2 Lot #-[id number]. 1.1B                                              Puraply #3 Lot # [id number]. 1.1B                                              Puraply # 4 Lot #: [id number]. 1.1B                                              Puraply #5 (2x2) applied                                              [place] # 1 (2x3) applied on 10/11/18                                               [place] #2 (2x3) applied on 10/18/18                                              Affinity #1 (2.5x2.5)

## 2019-08-12 ENCOUNTER — OFFICE VISIT (OUTPATIENT)
Dept: INTERNAL MEDICINE CLINIC | Age: 84
End: 2019-08-12
Payer: MEDICARE

## 2019-08-12 ENCOUNTER — ANTI-COAG VISIT (OUTPATIENT)
Dept: INTERNAL MEDICINE CLINIC | Age: 84
End: 2019-08-12

## 2019-08-12 VITALS
DIASTOLIC BLOOD PRESSURE: 70 MMHG | OXYGEN SATURATION: 94 % | HEIGHT: 66 IN | SYSTOLIC BLOOD PRESSURE: 124 MMHG | RESPIRATION RATE: 18 BRPM | HEART RATE: 84 BPM | BODY MASS INDEX: 22.02 KG/M2 | WEIGHT: 137 LBS

## 2019-08-12 DIAGNOSIS — E78.2 MIXED HYPERLIPIDEMIA: ICD-10-CM

## 2019-08-12 DIAGNOSIS — I73.9 CLAUDICATION IN PERIPHERAL VASCULAR DISEASE (HCC): ICD-10-CM

## 2019-08-12 DIAGNOSIS — I63.50 CEREBROVASCULAR ACCIDENT (CVA) DUE TO STENOSIS OF CEREBRAL ARTERY (HCC): ICD-10-CM

## 2019-08-12 DIAGNOSIS — Z00.00 ROUTINE GENERAL MEDICAL EXAMINATION AT A HEALTH CARE FACILITY: Primary | ICD-10-CM

## 2019-08-12 DIAGNOSIS — R42 VERTIGO: ICD-10-CM

## 2019-08-12 DIAGNOSIS — I10 ESSENTIAL HYPERTENSION: ICD-10-CM

## 2019-08-12 DIAGNOSIS — K21.00 GASTRO-ESOPHAGEAL REFLUX DISEASE WITH ESOPHAGITIS: ICD-10-CM

## 2019-08-12 DIAGNOSIS — R60.9 PERIPHERAL EDEMA: ICD-10-CM

## 2019-08-12 DIAGNOSIS — D68.9 COAGULATION DEFECT (HCC): ICD-10-CM

## 2019-08-12 PROCEDURE — 4040F PNEUMOC VAC/ADMIN/RCVD: CPT | Performed by: INTERNAL MEDICINE

## 2019-08-12 PROCEDURE — G0439 PPPS, SUBSEQ VISIT: HCPCS | Performed by: INTERNAL MEDICINE

## 2019-08-12 PROCEDURE — 1123F ACP DISCUSS/DSCN MKR DOCD: CPT | Performed by: INTERNAL MEDICINE

## 2019-08-12 PROCEDURE — G8598 ASA/ANTIPLAT THER USED: HCPCS | Performed by: INTERNAL MEDICINE

## 2019-08-12 RX ORDER — MECLIZINE HYDROCHLORIDE CHEWABLE TABLETS 25 MG/1
25 TABLET, CHEWABLE ORAL EVERY 6 HOURS PRN
Qty: 90 TABLET | Refills: 1 | Status: SHIPPED | OUTPATIENT
Start: 2019-08-12 | End: 2019-10-11 | Stop reason: SDUPTHER

## 2019-08-12 RX ORDER — ATENOLOL 25 MG/1
25 TABLET ORAL DAILY
Qty: 90 TABLET | Refills: 1 | Status: SHIPPED | OUTPATIENT
Start: 2019-08-12 | End: 2019-10-11 | Stop reason: SDUPTHER

## 2019-08-12 RX ORDER — CLOPIDOGREL BISULFATE 75 MG/1
75 TABLET ORAL EVERY OTHER DAY
Qty: 45 TABLET | Refills: 1 | Status: SHIPPED | OUTPATIENT
Start: 2019-08-12 | End: 2019-10-11 | Stop reason: SDUPTHER

## 2019-08-12 RX ORDER — WARFARIN SODIUM 2 MG/1
2 TABLET ORAL DAILY
Qty: 90 TABLET | Refills: 1 | Status: ON HOLD | OUTPATIENT
Start: 2019-08-12 | End: 2019-10-09 | Stop reason: HOSPADM

## 2019-08-12 RX ORDER — RANITIDINE 150 MG/1
150 CAPSULE ORAL 2 TIMES DAILY
Qty: 180 CAPSULE | Refills: 1 | Status: SHIPPED | OUTPATIENT
Start: 2019-08-12 | End: 2019-10-11 | Stop reason: SDUPTHER

## 2019-08-12 RX ORDER — HYDROCHLOROTHIAZIDE 12.5 MG/1
12.5 TABLET ORAL SEE ADMIN INSTRUCTIONS
Qty: 48 TABLET | Refills: 3 | Status: ON HOLD | OUTPATIENT
Start: 2019-08-12 | End: 2019-11-06 | Stop reason: HOSPADM

## 2019-08-12 ASSESSMENT — PATIENT HEALTH QUESTIONNAIRE - PHQ9
SUM OF ALL RESPONSES TO PHQ QUESTIONS 1-9: 0
SUM OF ALL RESPONSES TO PHQ QUESTIONS 1-9: 0

## 2019-08-12 ASSESSMENT — LIFESTYLE VARIABLES: HOW OFTEN DO YOU HAVE A DRINK CONTAINING ALCOHOL: 0

## 2019-08-12 NOTE — PROGRESS NOTES
ONE (1) TABLET BY MOUTH TWO TIMES DAILY jscript:void(0) Yes Vonzell Dubin, MD   ranitidine (ZANTAC) 150 MG capsule Take 1 capsule by mouth 2 times daily Yes Vonzell Dubin, MD   hydrALAZINE (APRESOLINE) 25 MG tablet Take 1 tablet by mouth 3 times daily Yes Vonzell Dubin, MD   clopidogrel (PLAVIX) 75 MG tablet Take 1 tablet by mouth every other day Yes Vonzell Dubin, MD   acetaminophen (TYLENOL) 325 MG tablet Take 2 tablets by mouth every 4 hours as needed for Pain Yes Marc Pritchett MD     Past Medical History:   Diagnosis Date    AAA (abdominal aortic [facility]) [address]     DISCUSSED W PT;/FAMILY 2/2019- NO FURTHER INTERVENTION PLANNED AS POOR CANDIDATE    Basosquamous carcinoma 8/12    L leg - Dr Kurt Flowers COPD (chronic obstructive pulmonary disease) ([facility]     CVA (cerebral vascular accident) ([facility]     L parietal w speech deficit  ~2000, sx resolved    Gastro-esophageal reflux disease with esophagitis     Gout     hand swelling and pain    Hyperlipidemia     Non-healing surgical wound     Osteoarthritis     Osteoarthritis, generalized     on chr pred started by Rheum    Peripheral vascular disease ([facility] 1/2008, 11/20/2008    S/P PTA and stents X 2 to Right leg- Dr Jeanette Childress Thoracic aortic aneurysm ([facility]     DISCUSSED W PT;/FAMILY 2/2019- NO FURTHER INTERVENTION PLANNED AS POOR CANDIDATE    Venous stasis ulcer ([address] 11/19/2012     Past Surgical History:   Procedure Laterality Date    APPENDECTOMY      ARTERY SURGERY      STENT TO LEFT FEMORIAL AND ALSO TO RIGHT SUPRA FEMORIAL ARTERY    FRACTURE SURGERY      Left ???   -ORIF    HYSTERECTOMY, TOTAL ABDOMINAL  1949    age 27-for fibroids    LEG SURGERY      11/20/2008-Right leg- PTA and Stent for PVD - Dr Samson Arnold:   1/2008- Right leg -PTA and Stent- Dr Jessenia Whelan  9/7/12    L basosquamous cell CA L leg    VASCULAR SURGERY       Family History   Problem Relation Age of Onset    Cancer Mother     Early Death Mother    Lauren

## 2019-08-22 ENCOUNTER — HOSPITAL ENCOUNTER (OUTPATIENT)
Dept: WOUND CARE | Age: 84
Discharge: HOME | End: 2019-08-22
Payer: MEDICARE

## 2019-08-22 VITALS
TEMPERATURE: 97.3 F | HEART RATE: 58 BPM | SYSTOLIC BLOOD PRESSURE: 139 MMHG | DIASTOLIC BLOOD PRESSURE: 52 MMHG | RESPIRATION RATE: 18 BRPM

## 2019-08-22 DIAGNOSIS — E78.2 MIXED HYPERLIPIDEMIA: ICD-10-CM

## 2019-08-22 DIAGNOSIS — S91.209D AVULSION OF TOENAIL, SUBSEQUENT ENCOUNTER: Primary | ICD-10-CM

## 2019-08-22 DIAGNOSIS — I10 ESSENTIAL HYPERTENSION: ICD-10-CM

## 2019-08-22 DIAGNOSIS — I63.50 CEREBROVASCULAR ACCIDENT (CVA) DUE TO STENOSIS OF CEREBRAL ARTERY (HCC): ICD-10-CM

## 2019-08-22 LAB
A/G RATIO: 1.5 (ref 1.1–2.2)
ALBUMIN SERPL-MCNC: 3.7 G/DL (ref 3.4–5)
ALP BLD-CCNC: 61 U/L (ref 40–129)
ALT SERPL-CCNC: 16 U/L (ref 10–40)
ANION GAP SERPL CALCULATED.3IONS-SCNC: 12 MMOL/L (ref 3–16)
AST SERPL-CCNC: 31 U/L (ref 15–37)
BASOPHILS ABSOLUTE: 0 K/UL (ref 0–0.2)
BASOPHILS RELATIVE PERCENT: 0.6 %
BILIRUB SERPL-MCNC: 0.4 MG/DL (ref 0–1)
BUN BLDV-MCNC: 33 MG/DL (ref 7–20)
CALCIUM SERPL-MCNC: 9.6 MG/DL (ref 8.3–10.6)
CHLORIDE BLD-SCNC: 97 MMOL/L (ref 99–110)
CHOLESTEROL, TOTAL: 151 MG/DL (ref 0–199)
CO2: 27 MMOL/L (ref 21–32)
CREAT SERPL-MCNC: 1.4 MG/DL (ref 0.6–1.2)
EOSINOPHILS ABSOLUTE: 0 K/UL (ref 0–0.6)
EOSINOPHILS RELATIVE PERCENT: 0.6 %
GFR AFRICAN AMERICAN: 42
GFR NON-AFRICAN AMERICAN: 35
GLOBULIN: 2.4 G/DL
GLUCOSE BLD-MCNC: 222 MG/DL (ref 70–99)
HCT VFR BLD CALC: 30.7 % (ref 36–48)
HDLC SERPL-MCNC: 66 MG/DL (ref 40–60)
HEMOGLOBIN: 10 G/DL (ref 12–16)
INR BLD: 1.8 (ref 0.86–1.14)
LDL CHOLESTEROL CALCULATED: 58 MG/DL
LYMPHOCYTES ABSOLUTE: 0.8 K/UL (ref 1–5.1)
LYMPHOCYTES RELATIVE PERCENT: 10.7 %
MCH RBC QN AUTO: 28.3 PG (ref 26–34)
MCHC RBC AUTO-ENTMCNC: 32.4 G/DL (ref 31–36)
MCV RBC AUTO: 87.3 FL (ref 80–100)
MONOCYTES ABSOLUTE: 0.5 K/UL (ref 0–1.3)
MONOCYTES RELATIVE PERCENT: 6.2 %
NEUTROPHILS ABSOLUTE: 6 K/UL (ref 1.7–7.7)
NEUTROPHILS RELATIVE PERCENT: 81.9 %
PDW BLD-RTO: 17.1 % (ref 12.4–15.4)
PLATELET # BLD: 207 K/UL (ref 135–450)
PMV BLD AUTO: 9.1 FL (ref 5–10.5)
POTASSIUM SERPL-SCNC: 4.4 MMOL/L (ref 3.5–5.1)
PROTHROMBIN TIME: 20.5 SEC (ref 9.8–13)
RBC # BLD: 3.52 M/UL (ref 4–5.2)
SODIUM BLD-SCNC: 136 MMOL/L (ref 136–145)
TOTAL PROTEIN: 6.1 G/DL (ref 6.4–8.2)
TRIGL SERPL-MCNC: 133 MG/DL (ref 0–150)
VLDLC SERPL CALC-MCNC: 27 MG/DL
WBC # BLD: 7.4 K/UL (ref 4–11)

## 2019-08-22 PROCEDURE — 99212 OFFICE O/P EST SF 10 MIN: CPT | Performed by: NURSE PRACTITIONER

## 2019-08-22 PROCEDURE — 99212 OFFICE O/P EST SF 10 MIN: CPT

## 2019-08-22 RX ORDER — DOXYCYCLINE HYCLATE 100 MG
100 TABLET ORAL 2 TIMES DAILY
Qty: 20 TABLET | Refills: 0 | Status: SHIPPED | OUTPATIENT
Start: 2019-08-22 | End: 2019-09-01

## 2019-08-22 ASSESSMENT — PAIN SCALES - GENERAL: PAINLEVEL_OUTOF10: 0

## 2019-08-22 NOTE — PROGRESS NOTES
PTA and Stent for PVD - Dr Antonia Martin:   2008- Right leg -PTA and Stent- Dr Sallie Phillips  12    L basosquamous cell CA L leg    VASCULAR SURGERY         FAMILY HISTORY    Family History   Problem Relation Age of Onset    Cancer Mother     Early Death Mother     Hearing Loss Mother     Cancer Sister        SOCIAL HISTORY    Social History     Tobacco Use    Smoking status: Former Smoker     Packs/day: 0.00     Last attempt to quit: 2000     Years since quittin.6    Smokeless tobacco: Former User    Tobacco comment: Prior Hx of 1 Pack per week, none currently   Substance Use Topics    Alcohol use: Yes     Comment: pt states \"one drink a day\"    Drug use: No       ALLERGIES    Allergies   Allergen Reactions    Lisinopril      Cough      Losartan      cough    Sulfa Antibiotics     Pcn [Penicillins] Rash     Does tolerate keflex       MEDICATIONS    Current Outpatient Medications on File Prior to Encounter   Medication Sig Dispense Refill    atenolol (TENORMIN) 25 MG tablet Take 1 tablet by mouth daily 90 tablet 1    warfarin (COUMADIN) 2 MG tablet Take 1 tablet by mouth daily Take 2mg every day except 1mg on Sundays 90 tablet 1    ranitidine (ZANTAC) 150 MG capsule Take 1 capsule by mouth 2 times daily 180 capsule 1    hydrochlorothiazide (HYDRODIURIL) 12.5 MG tablet Take 1 tablet by mouth See Admin Instructions Take 1 tablet  48 tablet 3    clopidogrel (PLAVIX) 75 MG tablet Take 1 tablet by mouth every other day 45 tablet 1    predniSONE (DELTASONE) 5 MG tablet Take 1 tablet by mouth daily 90 tablet 1    ferrous sulfate 325 (65 Fe) MG EC tablet Take 1 tablet by mouth daily (with breakfast) 90 tablet 1    cyclobenzaprine (FLEXERIL) 5 MG tablet Take 1 tablet by mouth every evening 90 tablet 1    lovastatin (MEVACOR) 40 MG tablet Take 1 tablet by mouth 2 times daily TAKE ONE (1) TABLET BY MOUTH TWO TIMES DAILY jscript:void(0) 180 tablet 1    hydrALAZINE force.                          PYUQ hands with soap and water before and after dressing changes.                         Prior to applying a clean dressing, cleanse wound with normal saline,                                wound cleanser, or mild soap and water.                           Ask the physician or nurse before getting the wound(s) wet in a shower        Daily Wound management:                          Keep weight off wounds and reposition every 2 hours.                          NIYLQ standing for long periods of time.                          IKOBE wraps/stockings in AM and remove at bedtime.                          If swelling is present, elevate legs to the level of the heart or above for 30  minutes 4-5 times a day and/or when sitti   ng.                                                  When taking antibiotics take entire prescription as ordered by physician do not stop taking until medicine is all gone.                             Orders for this week:  8/22/2019     Left great toe- paint with betadine, leave open to air.  Apply daily.      doxycycline 100 mg times 10 days 8/22/19    Follow up with Jairo Lea NP in 1 week  in the wound care center.     Call [phone] for any questions or concerns.     Date__________   Time____________             Treatment Note      Written Patient Dismissal Instructions Given            Electronically signed by LES Zamora CNP on 8/22/2019 at 1:33 PM

## 2019-08-23 ENCOUNTER — TELEPHONE (OUTPATIENT)
Dept: INTERNAL MEDICINE CLINIC | Age: 84
End: 2019-08-23

## 2019-08-28 ENCOUNTER — TELEPHONE (OUTPATIENT)
Dept: INTERNAL MEDICINE CLINIC | Age: 84
End: 2019-08-28

## 2019-08-29 ENCOUNTER — HOSPITAL ENCOUNTER (OUTPATIENT)
Dept: WOUND CARE | Age: 84
Discharge: HOME | End: 2019-08-29
Payer: MEDICARE

## 2019-08-29 VITALS — SYSTOLIC BLOOD PRESSURE: 162 MMHG | RESPIRATION RATE: 18 BRPM | HEART RATE: 59 BPM | DIASTOLIC BLOOD PRESSURE: 69 MMHG

## 2019-08-29 DIAGNOSIS — M79.675 PAIN OF LEFT GREAT TOE: ICD-10-CM

## 2019-08-29 DIAGNOSIS — S91.209D AVULSION OF TOENAIL, SUBSEQUENT ENCOUNTER: Primary | ICD-10-CM

## 2019-08-29 PROCEDURE — 11042 DBRDMT SUBQ TIS 1ST 20SQCM/<: CPT

## 2019-08-29 PROCEDURE — 87073 CULTURE BACTERIA ANAEROBIC: CPT

## 2019-08-29 PROCEDURE — 87077 CULTURE AEROBIC IDENTIFY: CPT

## 2019-08-29 PROCEDURE — 87186 SC STD MICRODIL/AGAR DIL: CPT

## 2019-08-29 PROCEDURE — 87071 CULTURE AEROBIC QUANT OTHER: CPT

## 2019-08-29 PROCEDURE — 11042 DBRDMT SUBQ TIS 1ST 20SQCM/<: CPT | Performed by: NURSE PRACTITIONER

## 2019-08-29 ASSESSMENT — PAIN DESCRIPTION - PROGRESSION: CLINICAL_PROGRESSION: NOT CHANGED

## 2019-08-29 ASSESSMENT — PAIN SCALES - GENERAL: PAINLEVEL_OUTOF10: 8

## 2019-08-29 ASSESSMENT — PAIN DESCRIPTION - LOCATION: LOCATION: TOE (COMMENT WHICH ONE)

## 2019-08-29 ASSESSMENT — PAIN DESCRIPTION - ORIENTATION: ORIENTATION: LEFT

## 2019-08-29 ASSESSMENT — PAIN DESCRIPTION - DESCRIPTORS: DESCRIPTORS: SHARP

## 2019-08-29 ASSESSMENT — PAIN DESCRIPTION - FREQUENCY: FREQUENCY: INTERMITTENT

## 2019-08-29 ASSESSMENT — PAIN DESCRIPTION - ONSET: ONSET: ON-GOING

## 2019-08-29 NOTE — PROGRESS NOTES
Time____________                      Treatment Note Wound 08/29/19 #15 left great toe-Dressing/Treatment: (cookie, 2x2 conform tape. )    Written Patient Dismissal Instructions Given            Electronically signed by LES Winslow CNP on 8/29/2019 at 2:37 PM

## 2019-09-02 LAB
CULTURE: ABNORMAL
Lab: ABNORMAL
SPECIMEN: ABNORMAL

## 2019-09-05 ENCOUNTER — HOSPITAL ENCOUNTER (OUTPATIENT)
Dept: WOUND CARE | Age: 84
Discharge: HOME | End: 2019-09-05
Payer: MEDICARE

## 2019-09-05 VITALS
HEART RATE: 65 BPM | DIASTOLIC BLOOD PRESSURE: 59 MMHG | SYSTOLIC BLOOD PRESSURE: 172 MMHG | RESPIRATION RATE: 18 BRPM | TEMPERATURE: 97 F

## 2019-09-05 DIAGNOSIS — S91.209D AVULSION OF TOENAIL, SUBSEQUENT ENCOUNTER: Primary | ICD-10-CM

## 2019-09-05 PROCEDURE — 99213 OFFICE O/P EST LOW 20 MIN: CPT

## 2019-09-05 PROCEDURE — 99212 OFFICE O/P EST SF 10 MIN: CPT | Performed by: NURSE PRACTITIONER

## 2019-09-05 RX ORDER — CIPROFLOXACIN 500 MG/1
500 TABLET, FILM COATED ORAL 2 TIMES DAILY
Qty: 20 TABLET | Refills: 0 | Status: SHIPPED | OUTPATIENT
Start: 2019-09-05 | End: 2019-09-15

## 2019-09-05 RX ORDER — LIDOCAINE HYDROCHLORIDE 40 MG/ML
SOLUTION TOPICAL ONCE
Status: DISCONTINUED | OUTPATIENT
Start: 2019-09-05 | End: 2019-09-06 | Stop reason: HOSPADM

## 2019-09-05 ASSESSMENT — PAIN SCALES - GENERAL: PAINLEVEL_OUTOF10: 0

## 2019-09-05 NOTE — PROGRESS NOTES
Wound Care Center Progress Note       Shawn Gonsales  AGE: 80 y.o. GENDER: female  : 1922  TODAY'S DATE:  2019        Subjective:     Chief Complaint   Patient presents with    Wound Check     right toe          HISTORY of PRESENT ILLNESS     Shawn Gonsales is a 80 y.o. female who presents today for wound evaluation of Acute traumatic ulcer(s) of left great toe. The ulcer is of moderate severity. The underlying cause of the wound is trauma. Xray of the left foot done 9/3/19 with no radiographic findings to suggest osteomyelitis, no acute fracture or traumatic malalignment. Wound culture from 19 with scanty growth klebsiella and proteus, neither with sensitivity to doxycycline, which patient has completed. Toe is with less redness and edema today, but does continue to have some redness, edema, and pain. Will start on cipro x 10 days for treatment of wound culture. She was advised to have INR checked within the week. She acknowledged understanding.    Wound Pain Timing/Severity: intermittent  Quality of pain: shooting, tender  Severity of pain:  3  10   Modifying Factors: edema and venous stasis  Associated Signs/Symptoms: edema, erythema and pain        PAST MEDICAL HISTORY        Diagnosis Date    AAA (abdominal aortic aneurysm) ([address]     DISCUSSED W PT;/FAMILY 2019- NO FURTHER INTERVENTION PLANNED AS POOR CANDIDATE    Basosquamous carcinoma     L leg - Dr Valentino Hamburger COPD (chronic obstructive pulmonary disease) ([address]     CVA (cerebral vascular accident) ([address]     L parietal w speech deficit  ~, sx resolved    Gastro-esophageal reflux disease with esophagitis     Gout     hand swelling and pain    Hyperlipidemia     Non-healing surgical wound     Osteoarthritis     Osteoarthritis, generalized     on chr pred started by Rheum    Peripheral vascular disease ([address] 2008, 2008    S/P PTA and stents X 2 to Right leg- Dr Lu Gr Thoracic aortic aneurysm ([address] tablet 1    predniSONE (DELTASONE) 5 MG tablet Take 1 tablet by mouth daily 90 tablet 1    ferrous sulfate 325 (65 Fe) MG EC tablet Take 1 tablet by mouth daily (with breakfast) 90 tablet 1    cyclobenzaprine (FLEXERIL) 5 MG tablet Take 1 tablet by mouth every evening 90 tablet 1    lovastatin (MEVACOR) 40 MG tablet Take 1 tablet by mouth 2 times daily TAKE ONE (1) TABLET BY MOUTH TWO TIMES DAILY jscript:void(0) 180 tablet 1    hydrALAZINE (APRESOLINE) 25 MG tablet Take 1 tablet by mouth 3 times daily 90 tablet 5    acetaminophen (TYLENOL) 325 MG tablet Take 2 tablets by mouth every 4 hours as needed for Pain 120 tablet 3    meclizine (TRAVEL SICKNESS) 25 MG CHEW Take 1 tablet by mouth every 6 hours as needed (dizziness/vertigo) 90 tablet 1     No current facility-administered medications on file prior to encounter. REVIEW OF SYSTEMS    Pertinent items are noted in HPI. Constitutional: Negative for systemic symptoms including fever, chills and malaise. Objective:      BP (!) 172/59   Pulse 65   Temp 97 °F (36.1 °C) (Temporal)   Resp 18   LMP  (LMP Unknown)     PHYSICAL EXAM    General: The patient is in no acute distress. Mental status:  Patient is appropriate, is  oriented to place and plan of care. Dermatologic exam: Visual inspection of the periwound reveals the skin to be edematous. Wound exam:  see wound description below     All active wounds listed below with today's date are evaluated      Wound 08/29/19 #15 left great toe (Active)   Wound Image   8/29/2019  1:00 PM   Dressing Status Clean;Dry; Intact 9/5/2019  1:26 PM   Dressing Changed Changed/New 9/5/2019  1:26 PM   Wound Cleansed Wound cleanser 9/5/2019  1:13 PM   Wound Length (cm) 0.5 cm 9/5/2019  1:13 PM   Wound Width (cm) 0.5 cm 9/5/2019  1:13 PM   Wound Depth (cm) 0.1 cm 9/5/2019  1:13 PM   Wound Surface Area (cm^2) 0.25 cm^2 9/5/2019  1:13 PM   Change in Wound Size % (l*w) 65.28 9/5/2019  1:13 PM   Wound Volume (cm^3)

## 2019-09-11 ENCOUNTER — TELEPHONE (OUTPATIENT)
Dept: INTERNAL MEDICINE CLINIC | Age: 84
End: 2019-09-11

## 2019-09-19 ENCOUNTER — HOSPITAL ENCOUNTER (OUTPATIENT)
Dept: WOUND CARE | Age: 84
Discharge: HOME | End: 2019-09-19
Payer: MEDICARE

## 2019-09-19 VITALS
RESPIRATION RATE: 18 BRPM | DIASTOLIC BLOOD PRESSURE: 89 MMHG | SYSTOLIC BLOOD PRESSURE: 139 MMHG | HEART RATE: 60 BPM | TEMPERATURE: 97.3 F

## 2019-09-19 DIAGNOSIS — S91.209D AVULSION OF TOENAIL, SUBSEQUENT ENCOUNTER: Primary | ICD-10-CM

## 2019-09-19 PROBLEM — I87.313 CHRONIC VENOUS HYPERTENSION (IDIOPATHIC) WITH ULCER OF BILATERAL LOWER EXTREMITY (CODE) (HCC): Status: RESOLVED | Noted: 2018-12-06 | Resolved: 2019-09-19

## 2019-09-19 PROBLEM — L97.912 TRAUMATIC ULCER OF RIGHT LOWER EXTREMITY WITH FAT LAYER EXPOSED (HCC): Status: RESOLVED | Noted: 2019-05-02 | Resolved: 2019-09-19

## 2019-09-19 PROBLEM — L97.922 TRAUMATIC ULCER OF LEFT LOWER LEG WITH FAT LAYER EXPOSED (HCC): Status: RESOLVED | Noted: 2019-02-05 | Resolved: 2019-09-19

## 2019-09-19 PROCEDURE — 99213 OFFICE O/P EST LOW 20 MIN: CPT | Performed by: NURSE PRACTITIONER

## 2019-09-19 PROCEDURE — 99213 OFFICE O/P EST LOW 20 MIN: CPT

## 2019-09-19 NOTE — PROGRESS NOTES
PM   Undermining Ends___ O'Clock 0 9/19/2019 12:59 PM   Undermining Maxium Distance (cm) 00 9/19/2019 12:59 PM   Wound Assessment Pink;Yellow 9/19/2019 12:59 PM   Drainage Amount Moderate 9/19/2019 12:59 PM   Drainage Description Yellow 9/19/2019 12:59 PM   Odor None 9/19/2019 12:59 PM   Margins Defined edges 9/19/2019 12:59 PM   Supriya-wound Assessment Pink 9/19/2019 12:59 PM   Non-staged Wound Description Full thickness 9/19/2019 12:59 PM   Waller%Wound Bed 50 9/19/2019 12:59 PM   Red%Wound Bed 0 9/19/2019 12:59 PM   Yellow%Wound Bed 50 9/19/2019 12:59 PM   Black%Wound Bed 0 9/19/2019 12:59 PM   Purple%Wound Bed 0 9/19/2019 12:59 PM   Other%Wound Bed 0 9/19/2019 12:59 PM   Number of days: 21       Assessment:     Problem List Items Addressed This Visit     Nail avulsion of toe, left great toe - Primary          Status of wound progress and description from last visit:   improving. Plan:     Discharge Instructions         Plan:      Discharge Instructions        PHYSICIAN ORDERS AND DISCHARGE INSTRUCTIONS     FAX TO [facility]     NOTE: Upon discharge from the [address], you will receive a patient experience survey.  We would be grateful if you would take the time to fill this survey out.     Wound care order history:                               Vascular studies: ARTERIAL STUDIES COMPLETE  Date 2/22/2018 AND 4/16/18-                                             VASCULAR  INTERVENTION SCHEDULED FOR 7/2/2018                                                     Imaging:   Date               Cultures:   Date 5/3/18 CULTURE OBTAINED; 6/14/18-wound culture postivie scanty growth MRSA-discussed with patient and son-doxycyline RX                                  8/23/20: Culture of left lower leg medial wound obtained      1/17/19 CULTURE OBTAINED    2/14/19 CULTURE OBTAINED              Labs/ HbA1c:   Date               Grafts: 8/30/2018: Puraply #1 Lot #-[id number]                                              Puraply management:                          SZVR weight off wounds and reposition every 2 hours.                          SDSWI standing for long periods of time.                          ESUSO wraps/stockings in AM and remove at bedtime.                          If swelling is present, elevate legs to the level of the heart or above for 30  minutes 4-5 times a day and/or when sitti   ng.                                                  When taking antibiotics take entire prescription as ordered by physician do not stop taking until medicine is all gone.                             Orders for this week:  9/19/2019     Left great toe- apply damp cookie to wound bed, cover with fluffed 2x2s, wrap with conform and tape.  fabiannge daily      Culture obtained 8/29  Xray 8/29     doxycycline 100 mg times 10 days 8/22/19     Follow up with Marina Lema NP in 2 week  in the wound care center.     Call [phone] for any questions or concerns.     Date__________   Time____________          Treatment Note Wound 08/29/19 #15 left great toe-Dressing/Treatment: (cookie, 2x2 conform tape. )    Written Patient Dismissal Instructions Given            Electronically signed by LES Esparza CNP on 9/19/2019 at 2:26 PM

## 2019-10-03 ENCOUNTER — HOSPITAL ENCOUNTER (OUTPATIENT)
Dept: WOUND CARE | Age: 84
Discharge: HOME | End: 2019-10-03
Payer: MEDICARE

## 2019-10-03 VITALS
SYSTOLIC BLOOD PRESSURE: 156 MMHG | TEMPERATURE: 97.9 F | DIASTOLIC BLOOD PRESSURE: 55 MMHG | HEART RATE: 60 BPM | RESPIRATION RATE: 18 BRPM

## 2019-10-03 DIAGNOSIS — S91.209D AVULSION OF TOENAIL, SUBSEQUENT ENCOUNTER: Primary | ICD-10-CM

## 2019-10-03 PROCEDURE — 99213 OFFICE O/P EST LOW 20 MIN: CPT

## 2019-10-03 PROCEDURE — 99212 OFFICE O/P EST SF 10 MIN: CPT | Performed by: NURSE PRACTITIONER

## 2019-10-03 ASSESSMENT — PAIN SCALES - GENERAL: PAINLEVEL_OUTOF10: 8

## 2019-10-03 ASSESSMENT — PAIN DESCRIPTION - ONSET: ONSET: ON-GOING

## 2019-10-03 ASSESSMENT — PAIN DESCRIPTION - FREQUENCY: FREQUENCY: INTERMITTENT

## 2019-10-03 ASSESSMENT — PAIN DESCRIPTION - PAIN TYPE: TYPE: CHRONIC PAIN

## 2019-10-03 ASSESSMENT — PAIN DESCRIPTION - LOCATION: LOCATION: TOE (COMMENT WHICH ONE)

## 2019-10-03 ASSESSMENT — PAIN DESCRIPTION - ORIENTATION: ORIENTATION: LEFT

## 2019-10-03 ASSESSMENT — PAIN DESCRIPTION - PROGRESSION: CLINICAL_PROGRESSION: NOT CHANGED

## 2019-10-03 ASSESSMENT — PAIN DESCRIPTION - DESCRIPTORS: DESCRIPTORS: SHOOTING

## 2019-10-07 ENCOUNTER — HOSPITAL ENCOUNTER (EMERGENCY)
Age: 84
Discharge: HOME | End: 2019-10-07
Payer: MEDICARE

## 2019-10-07 VITALS
DIASTOLIC BLOOD PRESSURE: 59 MMHG | HEIGHT: 66 IN | WEIGHT: 125 LBS | OXYGEN SATURATION: 100 % | HEART RATE: 63 BPM | TEMPERATURE: 98.1 F | BODY MASS INDEX: 20.09 KG/M2 | RESPIRATION RATE: 15 BRPM | SYSTOLIC BLOOD PRESSURE: 140 MMHG

## 2019-10-07 DIAGNOSIS — S51.819A SKIN TEAR OF FOREARM WITHOUT COMPLICATION, INITIAL ENCOUNTER: ICD-10-CM

## 2019-10-07 DIAGNOSIS — R79.1 SUPRATHERAPEUTIC INR: Primary | ICD-10-CM

## 2019-10-07 LAB
HCT VFR BLD CALC: 30.7 % (ref 37–47)
HEMOGLOBIN: 9.2 GM/DL (ref 12.5–16)
INR BLD: 8.13 INDEX
PROTHROMBIN TIME: 96.3 SECONDS (ref 9.12–12.5)

## 2019-10-07 PROCEDURE — 90715 TDAP VACCINE 7 YRS/> IM: CPT | Performed by: PHYSICIAN ASSISTANT

## 2019-10-07 PROCEDURE — 90471 IMMUNIZATION ADMIN: CPT | Performed by: PHYSICIAN ASSISTANT

## 2019-10-07 PROCEDURE — 85610 PROTHROMBIN TIME: CPT

## 2019-10-07 PROCEDURE — 6370000000 HC RX 637 (ALT 250 FOR IP): Performed by: PHYSICIAN ASSISTANT

## 2019-10-07 PROCEDURE — 85018 HEMOGLOBIN: CPT

## 2019-10-07 PROCEDURE — 99283 EMERGENCY DEPT VISIT LOW MDM: CPT

## 2019-10-07 PROCEDURE — 6360000002 HC RX W HCPCS: Performed by: PHYSICIAN ASSISTANT

## 2019-10-07 PROCEDURE — 36415 COLL VENOUS BLD VENIPUNCTURE: CPT

## 2019-10-07 PROCEDURE — 85014 HEMATOCRIT: CPT

## 2019-10-07 RX ORDER — BACITRACIN, NEOMYCIN, POLYMYXIN B 400; 3.5; 5 [USP'U]/G; MG/G; [USP'U]/G
OINTMENT TOPICAL ONCE
Status: COMPLETED | OUTPATIENT
Start: 2019-10-07 | End: 2019-10-07

## 2019-10-07 RX ADMIN — Medication: at 17:10

## 2019-10-07 RX ADMIN — BACITRACIN ZINC, NEOMYCIN SULFATE, POLYMYXIN B SULFATE 1 G: 3.5; 5000; 4 OINTMENT TOPICAL at 16:58

## 2019-10-07 RX ADMIN — TETANUS TOXOID, REDUCED DIPHTHERIA TOXOID AND ACELLULAR PERTUSSIS VACCINE, ADSORBED 0.5 ML: 5; 2.5; 8; 8; 2.5 SUSPENSION INTRAMUSCULAR at 16:57

## 2019-10-08 ENCOUNTER — HOSPITAL ENCOUNTER (OUTPATIENT)
Age: 84
Setting detail: OBSERVATION
Discharge: HOME | End: 2019-10-09
Attending: EMERGENCY MEDICINE | Admitting: INTERNAL MEDICINE
Payer: MEDICARE

## 2019-10-08 DIAGNOSIS — R79.1 ELEVATED INR: ICD-10-CM

## 2019-10-08 DIAGNOSIS — D50.0 BLOOD LOSS ANEMIA: Primary | ICD-10-CM

## 2019-10-08 DIAGNOSIS — S51.812A LACERATION OF LEFT FOREARM, INITIAL ENCOUNTER: ICD-10-CM

## 2019-10-08 PROBLEM — D62 ACUTE BLOOD LOSS ANEMIA: Status: ACTIVE | Noted: 2019-10-08

## 2019-10-08 PROBLEM — D68.9 COAGULOPATHY (HCC): Status: ACTIVE | Noted: 2019-10-08

## 2019-10-08 LAB
ALBUMIN SERPL-MCNC: 3.3 GM/DL (ref 3.4–5)
ALP BLD-CCNC: 53 IU/L (ref 40–129)
ALT SERPL-CCNC: 13 U/L (ref 10–40)
ANION GAP SERPL CALCULATED.3IONS-SCNC: 11 MMOL/L (ref 4–16)
APTT: 76.6 SECONDS (ref 21.2–33)
AST SERPL-CCNC: 21 IU/L (ref 15–37)
BASOPHILS ABSOLUTE: 0 K/CU MM
BASOPHILS RELATIVE PERCENT: 0.4 % (ref 0–1)
BILIRUB SERPL-MCNC: 0.3 MG/DL (ref 0–1)
BUN BLDV-MCNC: 37 MG/DL (ref 6–23)
CALCIUM SERPL-MCNC: 8.6 MG/DL (ref 8.3–10.6)
CHLORIDE BLD-SCNC: 100 MMOL/L (ref 99–110)
CO2: 26 MMOL/L (ref 21–32)
CREAT SERPL-MCNC: 1.6 MG/DL (ref 0.6–1.1)
DIFFERENTIAL TYPE: ABNORMAL
EOSINOPHILS ABSOLUTE: 0.1 K/CU MM
EOSINOPHILS RELATIVE PERCENT: 1 % (ref 0–3)
GFR AFRICAN AMERICAN: 36 ML/MIN/1.73M2
GFR NON-AFRICAN AMERICAN: 30 ML/MIN/1.73M2
GLUCOSE BLD-MCNC: 157 MG/DL (ref 70–99)
HCT VFR BLD CALC: 22.7 % (ref 37–47)
HCT VFR BLD CALC: 26.2 % (ref 37–47)
HEMOGLOBIN: 7.8 GM/DL (ref 12.5–16)
HEMOGLOBIN: ABNORMAL GM/DL (ref 12.5–16)
IMMATURE NEUTROPHIL %: 0.4 % (ref 0–0.43)
INR BLD: 8.45 INDEX
LYMPHOCYTES ABSOLUTE: 1.1 K/CU MM
LYMPHOCYTES RELATIVE PERCENT: 12.8 % (ref 24–44)
MCH RBC QN AUTO: 28.3 PG (ref 27–31)
MCHC RBC AUTO-ENTMCNC: 30 % (ref 32–36)
MCV RBC AUTO: 94.6 FL (ref 78–100)
MONOCYTES ABSOLUTE: 0.8 K/CU MM
MONOCYTES RELATIVE PERCENT: 9.1 % (ref 0–4)
NUCLEATED RBC %: 0 %
PDW BLD-RTO: 17.8 % (ref 11.7–14.9)
PLATELET # BLD: 212 K/CU MM (ref 140–440)
PMV BLD AUTO: 10.4 FL (ref 7.5–11.1)
POTASSIUM SERPL-SCNC: 4.3 MMOL/L (ref 3.5–5.1)
PROTHROMBIN TIME: 100.2 SECONDS (ref 9.12–12.5)
RBC # BLD: 2.4 M/CU MM (ref 4.2–5.4)
SEGMENTED NEUTROPHILS ABSOLUTE COUNT: 6.8 K/CU MM
SEGMENTED NEUTROPHILS RELATIVE PERCENT: 76.3 % (ref 36–66)
SODIUM BLD-SCNC: 137 MMOL/L (ref 135–145)
TOTAL IMMATURE NEUTOROPHIL: 0.04 K/CU MM
TOTAL NUCLEATED RBC: 0 K/CU MM
TOTAL PROTEIN: 5.2 GM/DL (ref 6.4–8.2)
WBC # BLD: 8.9 K/CU MM (ref 4–10.5)

## 2019-10-08 PROCEDURE — 36430 TRANSFUSION BLD/BLD COMPNT: CPT

## 2019-10-08 PROCEDURE — G0378 HOSPITAL OBSERVATION PER HR: HCPCS

## 2019-10-08 PROCEDURE — 85610 PROTHROMBIN TIME: CPT

## 2019-10-08 PROCEDURE — 85018 HEMOGLOBIN: CPT

## 2019-10-08 PROCEDURE — 2580000003 HC RX 258: Performed by: EMERGENCY MEDICINE

## 2019-10-08 PROCEDURE — 99220 PR INITIAL OBSERVATION CARE/DAY 70 MINUTES: CPT | Performed by: INTERNAL MEDICINE

## 2019-10-08 PROCEDURE — 6360000002 HC RX W HCPCS: Performed by: EMERGENCY MEDICINE

## 2019-10-08 PROCEDURE — 36415 COLL VENOUS BLD VENIPUNCTURE: CPT

## 2019-10-08 PROCEDURE — P9016 RBC LEUKOCYTES REDUCED: HCPCS

## 2019-10-08 PROCEDURE — 96365 THER/PROPH/DIAG IV INF INIT: CPT

## 2019-10-08 PROCEDURE — 85014 HEMATOCRIT: CPT

## 2019-10-08 PROCEDURE — 86901 BLOOD TYPING SEROLOGIC RH(D): CPT

## 2019-10-08 PROCEDURE — 86900 BLOOD TYPING SEROLOGIC ABO: CPT

## 2019-10-08 PROCEDURE — 6370000000 HC RX 637 (ALT 250 FOR IP): Performed by: INTERNAL MEDICINE

## 2019-10-08 PROCEDURE — 2580000003 HC RX 258: Performed by: INTERNAL MEDICINE

## 2019-10-08 PROCEDURE — 85730 THROMBOPLASTIN TIME PARTIAL: CPT

## 2019-10-08 PROCEDURE — 85025 COMPLETE CBC W/AUTO DIFF WBC: CPT

## 2019-10-08 PROCEDURE — 86850 RBC ANTIBODY SCREEN: CPT

## 2019-10-08 PROCEDURE — 99284 EMERGENCY DEPT VISIT MOD MDM: CPT

## 2019-10-08 PROCEDURE — 86922 COMPATIBILITY TEST ANTIGLOB: CPT

## 2019-10-08 PROCEDURE — 80053 COMPREHEN METABOLIC PANEL: CPT

## 2019-10-08 RX ORDER — PREDNISONE 1 MG/1
5 TABLET ORAL DAILY
Status: DISCONTINUED | OUTPATIENT
Start: 2019-10-08 | End: 2019-10-09 | Stop reason: HOSPADM

## 2019-10-08 RX ORDER — PHENOL 1.4 %
1 AEROSOL, SPRAY (ML) MUCOUS MEMBRANE DAILY
COMMUNITY
End: 2019-11-26

## 2019-10-08 RX ORDER — ACETAMINOPHEN 325 MG/1
650 TABLET ORAL EVERY 6 HOURS PRN
Status: DISCONTINUED | OUTPATIENT
Start: 2019-10-08 | End: 2019-10-09 | Stop reason: HOSPADM

## 2019-10-08 RX ORDER — SODIUM CHLORIDE 0.9 % (FLUSH) 0.9 %
10 SYRINGE (ML) INJECTION EVERY 12 HOURS SCHEDULED
Status: DISCONTINUED | OUTPATIENT
Start: 2019-10-08 | End: 2019-10-09 | Stop reason: HOSPADM

## 2019-10-08 RX ORDER — SODIUM CHLORIDE 9 MG/ML
INJECTION, SOLUTION INTRAVENOUS CONTINUOUS
Status: DISCONTINUED | OUTPATIENT
Start: 2019-10-08 | End: 2019-10-09 | Stop reason: HOSPADM

## 2019-10-08 RX ORDER — HYDRALAZINE HYDROCHLORIDE 25 MG/1
25 TABLET, FILM COATED ORAL 3 TIMES DAILY
Status: DISCONTINUED | OUTPATIENT
Start: 2019-10-08 | End: 2019-10-09 | Stop reason: HOSPADM

## 2019-10-08 RX ORDER — FERROUS SULFATE 325(65) MG
325 TABLET ORAL
Status: DISCONTINUED | OUTPATIENT
Start: 2019-10-09 | End: 2019-10-09 | Stop reason: HOSPADM

## 2019-10-08 RX ORDER — ATENOLOL 25 MG/1
25 TABLET ORAL DAILY
Status: DISCONTINUED | OUTPATIENT
Start: 2019-10-08 | End: 2019-10-09 | Stop reason: HOSPADM

## 2019-10-08 RX ORDER — HYDROCHLOROTHIAZIDE 12.5 MG/1
12.5 TABLET ORAL SEE ADMIN INSTRUCTIONS
Status: DISCONTINUED | OUTPATIENT
Start: 2019-10-08 | End: 2019-10-09 | Stop reason: HOSPADM

## 2019-10-08 RX ORDER — 0.9 % SODIUM CHLORIDE 0.9 %
250 INTRAVENOUS SOLUTION INTRAVENOUS ONCE
Status: COMPLETED | OUTPATIENT
Start: 2019-10-08 | End: 2019-10-08

## 2019-10-08 RX ORDER — ONDANSETRON 2 MG/ML
4 INJECTION INTRAMUSCULAR; INTRAVENOUS EVERY 6 HOURS PRN
Status: DISCONTINUED | OUTPATIENT
Start: 2019-10-08 | End: 2019-10-09 | Stop reason: HOSPADM

## 2019-10-08 RX ORDER — CYCLOBENZAPRINE HCL 10 MG
5 TABLET ORAL EVERY EVENING
Status: DISCONTINUED | OUTPATIENT
Start: 2019-10-08 | End: 2019-10-09 | Stop reason: HOSPADM

## 2019-10-08 RX ORDER — FAMOTIDINE 20 MG/1
20 TABLET, FILM COATED ORAL 2 TIMES DAILY
Status: DISCONTINUED | OUTPATIENT
Start: 2019-10-08 | End: 2019-10-08 | Stop reason: DRUGHIGH

## 2019-10-08 RX ORDER — LACTOSE-REDUCED FOOD
1 LIQUID (ML) ORAL 2 TIMES DAILY
COMMUNITY

## 2019-10-08 RX ORDER — MECLIZINE HCL 12.5 MG/1
25 TABLET ORAL EVERY 6 HOURS PRN
Status: DISCONTINUED | OUTPATIENT
Start: 2019-10-08 | End: 2019-10-09 | Stop reason: HOSPADM

## 2019-10-08 RX ORDER — SIMVASTATIN 20 MG
20 TABLET ORAL NIGHTLY
Status: DISCONTINUED | OUTPATIENT
Start: 2019-10-08 | End: 2019-10-09 | Stop reason: HOSPADM

## 2019-10-08 RX ORDER — SODIUM CHLORIDE 0.9 % (FLUSH) 0.9 %
10 SYRINGE (ML) INJECTION PRN
Status: DISCONTINUED | OUTPATIENT
Start: 2019-10-08 | End: 2019-10-09 | Stop reason: HOSPADM

## 2019-10-08 RX ORDER — FAMOTIDINE 20 MG/1
20 TABLET, FILM COATED ORAL NIGHTLY
Status: DISCONTINUED | OUTPATIENT
Start: 2019-10-08 | End: 2019-10-09 | Stop reason: HOSPADM

## 2019-10-08 RX ADMIN — CYCLOBENZAPRINE HYDROCHLORIDE 5 MG: 10 TABLET, FILM COATED ORAL at 16:29

## 2019-10-08 RX ADMIN — ATENOLOL 25 MG: 25 TABLET ORAL at 16:29

## 2019-10-08 RX ADMIN — SODIUM CHLORIDE 250 ML: 9 INJECTION, SOLUTION INTRAVENOUS at 10:58

## 2019-10-08 RX ADMIN — PHYTONADIONE 10 MG: 10 INJECTION, EMULSION INTRAMUSCULAR; INTRAVENOUS; SUBCUTANEOUS at 11:04

## 2019-10-08 RX ADMIN — HYDRALAZINE HYDROCHLORIDE 25 MG: 25 TABLET, FILM COATED ORAL at 16:28

## 2019-10-08 RX ADMIN — SODIUM CHLORIDE: 9 INJECTION, SOLUTION INTRAVENOUS at 16:28

## 2019-10-08 RX ADMIN — PREDNISONE 5 MG: 5 TABLET ORAL at 16:29

## 2019-10-08 ASSESSMENT — ENCOUNTER SYMPTOMS
CONSTIPATION: 0
NAUSEA: 0
SHORTNESS OF BREATH: 0
SINUS PRESSURE: 0
RHINORRHEA: 0
DIARRHEA: 0
VOMITING: 0
ABDOMINAL PAIN: 0
SORE THROAT: 0
WHEEZING: 0
COUGH: 0

## 2019-10-08 ASSESSMENT — PAIN SCALES - GENERAL: PAINLEVEL_OUTOF10: 1

## 2019-10-09 ENCOUNTER — TELEPHONE (OUTPATIENT)
Dept: INTERNAL MEDICINE CLINIC | Age: 84
End: 2019-10-09

## 2019-10-09 VITALS
RESPIRATION RATE: 18 BRPM | OXYGEN SATURATION: 96 % | TEMPERATURE: 97.7 F | HEIGHT: 66 IN | SYSTOLIC BLOOD PRESSURE: 140 MMHG | DIASTOLIC BLOOD PRESSURE: 80 MMHG | WEIGHT: 133 LBS | HEART RATE: 63 BPM | BODY MASS INDEX: 21.38 KG/M2

## 2019-10-09 LAB
ANION GAP SERPL CALCULATED.3IONS-SCNC: 10 MMOL/L (ref 4–16)
BASOPHILS ABSOLUTE: 0 K/CU MM
BASOPHILS RELATIVE PERCENT: 0.4 % (ref 0–1)
BUN BLDV-MCNC: 34 MG/DL (ref 6–23)
CALCIUM SERPL-MCNC: 8.5 MG/DL (ref 8.3–10.6)
CHLORIDE BLD-SCNC: 106 MMOL/L (ref 99–110)
CO2: 24 MMOL/L (ref 21–32)
CREAT SERPL-MCNC: 1.5 MG/DL (ref 0.6–1.1)
DIFFERENTIAL TYPE: ABNORMAL
EOSINOPHILS ABSOLUTE: 0.1 K/CU MM
EOSINOPHILS RELATIVE PERCENT: 0.9 % (ref 0–3)
GFR AFRICAN AMERICAN: 39 ML/MIN/1.73M2
GFR NON-AFRICAN AMERICAN: 32 ML/MIN/1.73M2
GLUCOSE BLD-MCNC: 136 MG/DL (ref 70–99)
HCT VFR BLD CALC: 23.2 % (ref 37–47)
HEMOGLOBIN: 7 GM/DL (ref 12.5–16)
IMMATURE NEUTROPHIL %: 0.4 % (ref 0–0.43)
INR BLD: 1.02 INDEX
LYMPHOCYTES ABSOLUTE: 1.4 K/CU MM
LYMPHOCYTES RELATIVE PERCENT: 17.2 % (ref 24–44)
MCH RBC QN AUTO: 27.7 PG (ref 27–31)
MCHC RBC AUTO-ENTMCNC: 30.2 % (ref 32–36)
MCV RBC AUTO: 91.7 FL (ref 78–100)
MONOCYTES ABSOLUTE: 0.8 K/CU MM
MONOCYTES RELATIVE PERCENT: 9.6 % (ref 0–4)
NUCLEATED RBC %: 0 %
PDW BLD-RTO: 18.3 % (ref 11.7–14.9)
PLATELET # BLD: ABNORMAL K/CU MM (ref 140–440)
PMV BLD AUTO: 11 FL (ref 7.5–11.1)
POTASSIUM SERPL-SCNC: 4 MMOL/L (ref 3.5–5.1)
PROTHROMBIN TIME: 11.8 SECONDS (ref 9.12–12.5)
RBC # BLD: 2.53 M/CU MM (ref 4.2–5.4)
SEGMENTED NEUTROPHILS ABSOLUTE COUNT: 5.7 K/CU MM
SEGMENTED NEUTROPHILS RELATIVE PERCENT: 71.5 % (ref 36–66)
SODIUM BLD-SCNC: 140 MMOL/L (ref 135–145)
TOTAL IMMATURE NEUTOROPHIL: 0.03 K/CU MM
TOTAL NUCLEATED RBC: 0 K/CU MM
WBC # BLD: 8 K/CU MM (ref 4–10.5)

## 2019-10-09 PROCEDURE — 85610 PROTHROMBIN TIME: CPT

## 2019-10-09 PROCEDURE — 85025 COMPLETE CBC W/AUTO DIFF WBC: CPT

## 2019-10-09 PROCEDURE — 6370000000 HC RX 637 (ALT 250 FOR IP): Performed by: INTERNAL MEDICINE

## 2019-10-09 PROCEDURE — 94761 N-INVAS EAR/PLS OXIMETRY MLT: CPT

## 2019-10-09 PROCEDURE — 99217 PR OBSERVATION CARE DISCHARGE MANAGEMENT: CPT | Performed by: INTERNAL MEDICINE

## 2019-10-09 PROCEDURE — 2580000003 HC RX 258: Performed by: INTERNAL MEDICINE

## 2019-10-09 PROCEDURE — 36415 COLL VENOUS BLD VENIPUNCTURE: CPT

## 2019-10-09 PROCEDURE — 80048 BASIC METABOLIC PNL TOTAL CA: CPT

## 2019-10-09 PROCEDURE — G0378 HOSPITAL OBSERVATION PER HR: HCPCS

## 2019-10-09 RX ADMIN — ATENOLOL 25 MG: 25 TABLET ORAL at 09:54

## 2019-10-09 RX ADMIN — Medication 10 ML: at 09:55

## 2019-10-09 RX ADMIN — Medication 10 ML: at 00:07

## 2019-10-09 RX ADMIN — FAMOTIDINE 20 MG: 20 TABLET ORAL at 00:06

## 2019-10-09 RX ADMIN — HYDRALAZINE HYDROCHLORIDE 25 MG: 25 TABLET, FILM COATED ORAL at 09:54

## 2019-10-09 RX ADMIN — PREDNISONE 5 MG: 5 TABLET ORAL at 09:54

## 2019-10-09 RX ADMIN — SIMVASTATIN 20 MG: 20 TABLET, FILM COATED ORAL at 00:06

## 2019-10-09 RX ADMIN — FERROUS SULFATE TAB 325 MG (65 MG ELEMENTAL FE) 325 MG: 325 (65 FE) TAB at 09:54

## 2019-10-10 LAB
ABO/RH: NORMAL
ANTIBODY SCREEN: NEGATIVE
COMPONENT: NORMAL
COMPONENT: NORMAL
CROSSMATCH RESULT: NORMAL
CROSSMATCH RESULT: NORMAL
STATUS: NORMAL
STATUS: NORMAL
TRANSFUSION STATUS: NORMAL
TRANSFUSION STATUS: NORMAL
UNIT DIVISION: 0
UNIT DIVISION: 0
UNIT NUMBER: NORMAL
UNIT NUMBER: NORMAL

## 2019-10-11 ENCOUNTER — OFFICE VISIT (OUTPATIENT)
Dept: INTERNAL MEDICINE CLINIC | Age: 84
End: 2019-10-11
Payer: MEDICARE

## 2019-10-11 VITALS
DIASTOLIC BLOOD PRESSURE: 84 MMHG | OXYGEN SATURATION: 94 % | RESPIRATION RATE: 18 BRPM | SYSTOLIC BLOOD PRESSURE: 144 MMHG | HEART RATE: 77 BPM

## 2019-10-11 DIAGNOSIS — D62 ACUTE BLOOD LOSS ANEMIA: ICD-10-CM

## 2019-10-11 DIAGNOSIS — R42 VERTIGO: ICD-10-CM

## 2019-10-11 DIAGNOSIS — M15.9 OSTEOARTHRITIS, GENERALIZED: ICD-10-CM

## 2019-10-11 DIAGNOSIS — I63.50 CEREBROVASCULAR ACCIDENT (CVA) DUE TO STENOSIS OF CEREBRAL ARTERY (HCC): ICD-10-CM

## 2019-10-11 DIAGNOSIS — D68.9 COAGULOPATHY (HCC): ICD-10-CM

## 2019-10-11 DIAGNOSIS — K21.00 GASTRO-ESOPHAGEAL REFLUX DISEASE WITH ESOPHAGITIS: ICD-10-CM

## 2019-10-11 DIAGNOSIS — E78.2 MIXED HYPERLIPIDEMIA: ICD-10-CM

## 2019-10-11 DIAGNOSIS — I63.20 CEREBROVASCULAR ACCIDENT (CVA) DUE TO STENOSIS OF PRECEREBRAL ARTERY (HCC): ICD-10-CM

## 2019-10-11 DIAGNOSIS — I73.9 CLAUDICATION IN PERIPHERAL VASCULAR DISEASE (HCC): ICD-10-CM

## 2019-10-11 DIAGNOSIS — D62 ANEMIA DUE TO ACUTE BLOOD LOSS: Primary | ICD-10-CM

## 2019-10-11 DIAGNOSIS — I10 ESSENTIAL HYPERTENSION: ICD-10-CM

## 2019-10-11 DIAGNOSIS — R60.9 PERIPHERAL EDEMA: ICD-10-CM

## 2019-10-11 DIAGNOSIS — A09 ACUTE INFECTIOUS DIARRHEA: ICD-10-CM

## 2019-10-11 PROCEDURE — 1111F DSCHRG MED/CURRENT MED MERGE: CPT | Performed by: INTERNAL MEDICINE

## 2019-10-11 PROCEDURE — 99496 TRANSJ CARE MGMT HIGH F2F 7D: CPT | Performed by: INTERNAL MEDICINE

## 2019-10-11 RX ORDER — CHOLESTYRAMINE 4 G/9G
1 POWDER, FOR SUSPENSION ORAL DAILY
Qty: 10 PACKET | Refills: 0 | Status: SHIPPED | OUTPATIENT
Start: 2019-10-11 | End: 2019-10-15

## 2019-10-11 RX ORDER — METRONIDAZOLE 250 MG/1
250 TABLET ORAL 2 TIMES DAILY
Qty: 20 TABLET | Refills: 0 | Status: SHIPPED | OUTPATIENT
Start: 2019-10-11 | End: 2019-10-21

## 2019-10-11 RX ORDER — PREDNISONE 1 MG/1
5 TABLET ORAL DAILY
Qty: 90 TABLET | Refills: 1 | Status: SHIPPED | OUTPATIENT
Start: 2019-10-11 | End: 2019-12-20 | Stop reason: SDUPTHER

## 2019-10-11 RX ORDER — LANOLIN ALCOHOL/MO/W.PET/CERES
325 CREAM (GRAM) TOPICAL
Qty: 90 TABLET | Refills: 1 | Status: SHIPPED | OUTPATIENT
Start: 2019-10-11 | End: 2019-12-20 | Stop reason: SDUPTHER

## 2019-10-11 RX ORDER — WARFARIN SODIUM 3 MG/1
TABLET ORAL
Qty: 90 TABLET | Refills: 1 | Status: SHIPPED | OUTPATIENT
Start: 2019-10-11 | End: 2019-10-30 | Stop reason: DRUGHIGH

## 2019-10-11 RX ORDER — RANITIDINE 150 MG/1
150 CAPSULE ORAL 2 TIMES DAILY
Qty: 180 CAPSULE | Refills: 1 | Status: SHIPPED | OUTPATIENT
Start: 2019-10-11 | End: 2019-11-26

## 2019-10-11 RX ORDER — HYDROCHLOROTHIAZIDE 12.5 MG/1
12.5 TABLET ORAL SEE ADMIN INSTRUCTIONS
Qty: 48 TABLET | Refills: 3 | Status: CANCELLED | OUTPATIENT
Start: 2019-10-11

## 2019-10-11 RX ORDER — HYDRALAZINE HYDROCHLORIDE 25 MG/1
25 TABLET, FILM COATED ORAL 3 TIMES DAILY
Qty: 90 TABLET | Refills: 5 | Status: ON HOLD | OUTPATIENT
Start: 2019-10-11 | End: 2019-11-06 | Stop reason: HOSPADM

## 2019-10-11 RX ORDER — MECLIZINE HYDROCHLORIDE CHEWABLE TABLETS 25 MG/1
25 TABLET, CHEWABLE ORAL 2 TIMES DAILY PRN
Qty: 180 TABLET | Refills: 1 | Status: SHIPPED | OUTPATIENT
Start: 2019-10-11 | End: 2019-12-20 | Stop reason: SDUPTHER

## 2019-10-11 RX ORDER — CYCLOBENZAPRINE HCL 5 MG
5 TABLET ORAL EVERY EVENING
Qty: 90 TABLET | Refills: 1 | Status: SHIPPED | OUTPATIENT
Start: 2019-10-11 | End: 2019-12-20 | Stop reason: SDUPTHER

## 2019-10-11 RX ORDER — CLOPIDOGREL BISULFATE 75 MG/1
75 TABLET ORAL EVERY OTHER DAY
Qty: 45 TABLET | Refills: 1 | Status: SHIPPED | OUTPATIENT
Start: 2019-10-11 | End: 2019-12-20 | Stop reason: SDUPTHER

## 2019-10-11 RX ORDER — ATENOLOL 25 MG/1
25 TABLET ORAL DAILY
Qty: 90 TABLET | Refills: 1 | Status: SHIPPED | OUTPATIENT
Start: 2019-10-11 | End: 2019-12-20 | Stop reason: SDUPTHER

## 2019-10-11 RX ORDER — LOVASTATIN 40 MG/1
40 TABLET ORAL 2 TIMES DAILY
Qty: 180 TABLET | Refills: 1 | Status: SHIPPED | OUTPATIENT
Start: 2019-10-11 | End: 2019-11-26

## 2019-10-15 ENCOUNTER — OFFICE VISIT (OUTPATIENT)
Dept: INTERNAL MEDICINE CLINIC | Age: 84
End: 2019-10-15
Payer: MEDICARE

## 2019-10-15 VITALS
BODY MASS INDEX: 21.31 KG/M2 | SYSTOLIC BLOOD PRESSURE: 126 MMHG | RESPIRATION RATE: 18 BRPM | WEIGHT: 132 LBS | OXYGEN SATURATION: 98 % | HEART RATE: 69 BPM | DIASTOLIC BLOOD PRESSURE: 76 MMHG

## 2019-10-15 DIAGNOSIS — L03.114 CELLULITIS OF LEFT ARM: ICD-10-CM

## 2019-10-15 DIAGNOSIS — I63.20 CEREBROVASCULAR ACCIDENT (CVA) DUE TO STENOSIS OF PRECEREBRAL ARTERY (HCC): Primary | ICD-10-CM

## 2019-10-15 DIAGNOSIS — R19.7 ACUTE DIARRHEA: ICD-10-CM

## 2019-10-15 DIAGNOSIS — D68.9 COAGULOPATHY (HCC): ICD-10-CM

## 2019-10-15 LAB
INTERNATIONAL NORMALIZATION RATIO, POC: 1.4
PROTHROMBIN TIME, POC: 19.8

## 2019-10-15 PROCEDURE — 1036F TOBACCO NON-USER: CPT | Performed by: INTERNAL MEDICINE

## 2019-10-15 PROCEDURE — G8484 FLU IMMUNIZE NO ADMIN: HCPCS | Performed by: INTERNAL MEDICINE

## 2019-10-15 PROCEDURE — 1123F ACP DISCUSS/DSCN MKR DOCD: CPT | Performed by: INTERNAL MEDICINE

## 2019-10-15 PROCEDURE — 4040F PNEUMOC VAC/ADMIN/RCVD: CPT | Performed by: INTERNAL MEDICINE

## 2019-10-15 PROCEDURE — G8598 ASA/ANTIPLAT THER USED: HCPCS | Performed by: INTERNAL MEDICINE

## 2019-10-15 PROCEDURE — G8427 DOCREV CUR MEDS BY ELIG CLIN: HCPCS | Performed by: INTERNAL MEDICINE

## 2019-10-15 PROCEDURE — 1090F PRES/ABSN URINE INCON ASSESS: CPT | Performed by: INTERNAL MEDICINE

## 2019-10-15 PROCEDURE — G8420 CALC BMI NORM PARAMETERS: HCPCS | Performed by: INTERNAL MEDICINE

## 2019-10-15 PROCEDURE — 99213 OFFICE O/P EST LOW 20 MIN: CPT | Performed by: INTERNAL MEDICINE

## 2019-10-15 PROCEDURE — 85610 PROTHROMBIN TIME: CPT | Performed by: INTERNAL MEDICINE

## 2019-10-17 ENCOUNTER — HOSPITAL ENCOUNTER (OUTPATIENT)
Dept: WOUND CARE | Age: 84
Discharge: HOME | End: 2019-10-17
Payer: MEDICARE

## 2019-10-17 VITALS
SYSTOLIC BLOOD PRESSURE: 149 MMHG | HEART RATE: 73 BPM | RESPIRATION RATE: 17 BRPM | TEMPERATURE: 97.8 F | DIASTOLIC BLOOD PRESSURE: 72 MMHG

## 2019-10-17 DIAGNOSIS — S81.811D NONINFECTED SKIN TEAR OF RIGHT LEG, SUBSEQUENT ENCOUNTER: ICD-10-CM

## 2019-10-17 DIAGNOSIS — S41.112D SKIN TEAR OF UPPER ARM WITHOUT COMPLICATION, LEFT, SUBSEQUENT ENCOUNTER: ICD-10-CM

## 2019-10-17 DIAGNOSIS — S91.209D AVULSION OF TOENAIL, SUBSEQUENT ENCOUNTER: Primary | ICD-10-CM

## 2019-10-17 PROCEDURE — 97597 DBRDMT OPN WND 1ST 20 CM/<: CPT

## 2019-10-17 PROCEDURE — 97597 DBRDMT OPN WND 1ST 20 CM/<: CPT | Performed by: NURSE PRACTITIONER

## 2019-10-17 RX ORDER — LIDOCAINE HYDROCHLORIDE 40 MG/ML
SOLUTION TOPICAL ONCE
Status: DISCONTINUED | OUTPATIENT
Start: 2019-10-17 | End: 2019-10-18 | Stop reason: HOSPADM

## 2019-10-17 ASSESSMENT — PAIN DESCRIPTION - PAIN TYPE: TYPE: CHRONIC PAIN

## 2019-10-17 ASSESSMENT — PAIN DESCRIPTION - LOCATION: LOCATION: TOE (COMMENT WHICH ONE)

## 2019-10-21 ENCOUNTER — HOSPITAL ENCOUNTER (OUTPATIENT)
Age: 84
Setting detail: SPECIMEN
Discharge: HOME | End: 2019-10-21
Payer: MEDICARE

## 2019-10-21 ENCOUNTER — TELEPHONE (OUTPATIENT)
Dept: INTERNAL MEDICINE CLINIC | Age: 84
End: 2019-10-21

## 2019-10-21 LAB
ALBUMIN SERPL-MCNC: 2.9 GM/DL (ref 3.4–5)
ALP BLD-CCNC: 73 IU/L (ref 40–128)
ALT SERPL-CCNC: 9 U/L (ref 10–40)
ANION GAP SERPL CALCULATED.3IONS-SCNC: 10 MMOL/L (ref 4–16)
AST SERPL-CCNC: 18 IU/L (ref 15–37)
BASOPHILS ABSOLUTE: 0 K/CU MM
BASOPHILS RELATIVE PERCENT: 0.3 % (ref 0–1)
BILIRUB SERPL-MCNC: 0.3 MG/DL (ref 0–1)
BUN BLDV-MCNC: 17 MG/DL (ref 6–23)
CALCIUM SERPL-MCNC: 8.3 MG/DL (ref 8.3–10.6)
CHLORIDE BLD-SCNC: 101 MMOL/L (ref 99–110)
CO2: 26 MMOL/L (ref 21–32)
CREAT SERPL-MCNC: 1.4 MG/DL (ref 0.6–1.1)
DIFFERENTIAL TYPE: ABNORMAL
EOSINOPHILS ABSOLUTE: 0.1 K/CU MM
EOSINOPHILS RELATIVE PERCENT: 0.4 % (ref 0–3)
GFR AFRICAN AMERICAN: 42 ML/MIN/1.73M2
GFR NON-AFRICAN AMERICAN: 35 ML/MIN/1.73M2
GLUCOSE BLD-MCNC: 123 MG/DL (ref 70–99)
HCT VFR BLD CALC: 26 % (ref 37–47)
HEMOGLOBIN: 7.6 GM/DL (ref 12.5–16)
IMMATURE NEUTROPHIL %: 0.5 % (ref 0–0.43)
LYMPHOCYTES ABSOLUTE: 1 K/CU MM
LYMPHOCYTES RELATIVE PERCENT: 7.5 % (ref 24–44)
MCH RBC QN AUTO: 28 PG (ref 27–31)
MCHC RBC AUTO-ENTMCNC: 29.2 % (ref 32–36)
MCV RBC AUTO: 95.9 FL (ref 78–100)
MONOCYTES ABSOLUTE: 0.9 K/CU MM
MONOCYTES RELATIVE PERCENT: 6.8 % (ref 0–4)
NUCLEATED RBC %: 0 %
PDW BLD-RTO: 16.9 % (ref 11.7–14.9)
PLATELET # BLD: 254 K/CU MM (ref 140–440)
PMV BLD AUTO: 10.7 FL (ref 7.5–11.1)
POTASSIUM SERPL-SCNC: 4.4 MMOL/L (ref 3.5–5.1)
RBC # BLD: 2.71 M/CU MM (ref 4.2–5.4)
SEGMENTED NEUTROPHILS ABSOLUTE COUNT: 10.9 K/CU MM
SEGMENTED NEUTROPHILS RELATIVE PERCENT: 84.5 % (ref 36–66)
SODIUM BLD-SCNC: 137 MMOL/L (ref 135–145)
TOTAL IMMATURE NEUTOROPHIL: 0.07 K/CU MM
TOTAL NUCLEATED RBC: 0 K/CU MM
TOTAL PROTEIN: 4.7 GM/DL (ref 6.4–8.2)
WBC # BLD: 12.9 K/CU MM (ref 4–10.5)

## 2019-10-21 PROCEDURE — 80053 COMPREHEN METABOLIC PANEL: CPT

## 2019-10-21 PROCEDURE — 85025 COMPLETE CBC W/AUTO DIFF WBC: CPT

## 2019-10-22 ENCOUNTER — TELEPHONE (OUTPATIENT)
Dept: INTERNAL MEDICINE CLINIC | Age: 84
End: 2019-10-22

## 2019-10-30 ENCOUNTER — TELEPHONE (OUTPATIENT)
Dept: INTERNAL MEDICINE CLINIC | Age: 84
End: 2019-10-30

## 2019-10-30 DIAGNOSIS — I63.20 CEREBROVASCULAR ACCIDENT (CVA) DUE TO STENOSIS OF PRECEREBRAL ARTERY (HCC): ICD-10-CM

## 2019-10-30 RX ORDER — WARFARIN SODIUM 2 MG/1
TABLET ORAL
Qty: 30 TABLET | Refills: 1 | Status: SHIPPED | OUTPATIENT
Start: 2019-10-30 | End: 2019-11-26 | Stop reason: SDUPTHER

## 2019-10-31 ENCOUNTER — APPOINTMENT (OUTPATIENT)
Dept: ULTRASOUND IMAGING | Age: 84
DRG: 271 | End: 2019-10-31
Payer: MEDICARE

## 2019-10-31 ENCOUNTER — HOSPITAL ENCOUNTER (INPATIENT)
Age: 84
LOS: 6 days | Discharge: NURSING, REHAB OR LONG-TERM CARE | DRG: 271 | End: 2019-11-06
Attending: EMERGENCY MEDICINE | Admitting: INTERNAL MEDICINE
Payer: MEDICARE

## 2019-10-31 ENCOUNTER — HOSPITAL ENCOUNTER (OUTPATIENT)
Dept: WOUND CARE | Age: 84
Discharge: HOME | DRG: 271 | End: 2019-10-31
Payer: MEDICARE

## 2019-10-31 ENCOUNTER — APPOINTMENT (OUTPATIENT)
Dept: GENERAL RADIOLOGY | Age: 84
DRG: 271 | End: 2019-10-31
Payer: MEDICARE

## 2019-10-31 VITALS
SYSTOLIC BLOOD PRESSURE: 119 MMHG | DIASTOLIC BLOOD PRESSURE: 66 MMHG | RESPIRATION RATE: 16 BRPM | TEMPERATURE: 98.1 F | HEART RATE: 76 BPM

## 2019-10-31 DIAGNOSIS — L08.9 RIGHT FOOT INFECTION: ICD-10-CM

## 2019-10-31 DIAGNOSIS — S41.112D SKIN TEAR OF UPPER ARM WITHOUT COMPLICATION, LEFT, SUBSEQUENT ENCOUNTER: ICD-10-CM

## 2019-10-31 DIAGNOSIS — I77.1 ARTERIAL INSUFFICIENCY WITH ISCHEMIC ULCER (HCC): ICD-10-CM

## 2019-10-31 DIAGNOSIS — S91.209D AVULSION OF TOENAIL, SUBSEQUENT ENCOUNTER: ICD-10-CM

## 2019-10-31 DIAGNOSIS — I70.201: Primary | ICD-10-CM

## 2019-10-31 DIAGNOSIS — L97.512 SKIN ULCER OF RIGHT FOOT WITH FAT LAYER EXPOSED (HCC): ICD-10-CM

## 2019-10-31 DIAGNOSIS — S81.811D NONINFECTED SKIN TEAR OF RIGHT LEG, SUBSEQUENT ENCOUNTER: Primary | ICD-10-CM

## 2019-10-31 DIAGNOSIS — L98.499 ARTERIAL INSUFFICIENCY WITH ISCHEMIC ULCER (HCC): ICD-10-CM

## 2019-10-31 PROBLEM — I70.229 CRITICAL LOWER LIMB ISCHEMIA (HCC): Status: ACTIVE | Noted: 2019-10-31

## 2019-10-31 LAB
ALBUMIN SERPL-MCNC: 3.4 GM/DL (ref 3.4–5)
ALP BLD-CCNC: 109 IU/L (ref 40–128)
ALT SERPL-CCNC: 24 U/L (ref 10–40)
ANION GAP SERPL CALCULATED.3IONS-SCNC: 16 MMOL/L (ref 4–16)
APTT: 28.9 SECONDS (ref 25.1–37.1)
AST SERPL-CCNC: 32 IU/L (ref 15–37)
BASOPHILS ABSOLUTE: 0.1 K/CU MM
BASOPHILS RELATIVE PERCENT: 0.5 % (ref 0–1)
BILIRUB SERPL-MCNC: 0.2 MG/DL (ref 0–1)
BUN BLDV-MCNC: 22 MG/DL (ref 6–23)
CALCIUM SERPL-MCNC: 9.5 MG/DL (ref 8.3–10.6)
CHLORIDE BLD-SCNC: 101 MMOL/L (ref 99–110)
CO2: 22 MMOL/L (ref 21–32)
CREAT SERPL-MCNC: 1.4 MG/DL (ref 0.6–1.1)
DIFFERENTIAL TYPE: ABNORMAL
EOSINOPHILS ABSOLUTE: 0 K/CU MM
EOSINOPHILS RELATIVE PERCENT: 0.1 % (ref 0–3)
GFR AFRICAN AMERICAN: 42 ML/MIN/1.73M2
GFR NON-AFRICAN AMERICAN: 35 ML/MIN/1.73M2
GLUCOSE BLD-MCNC: 135 MG/DL (ref 70–99)
HCT VFR BLD CALC: 31.5 % (ref 37–47)
HEMOGLOBIN: 9.2 GM/DL (ref 12.5–16)
HIGH SENSITIVE C-REACTIVE PROTEIN: 38.8 MG/L
IMMATURE NEUTROPHIL %: 1 % (ref 0–0.43)
INR BLD: 1.26 INDEX
LYMPHOCYTES ABSOLUTE: 1.2 K/CU MM
LYMPHOCYTES RELATIVE PERCENT: 7.9 % (ref 24–44)
MCH RBC QN AUTO: 26.7 PG (ref 27–31)
MCHC RBC AUTO-ENTMCNC: 29.2 % (ref 32–36)
MCV RBC AUTO: 91.3 FL (ref 78–100)
MONOCYTES ABSOLUTE: 0.6 K/CU MM
MONOCYTES RELATIVE PERCENT: 3.8 % (ref 0–4)
NUCLEATED RBC %: 0 %
PDW BLD-RTO: 15.7 % (ref 11.7–14.9)
PLATELET # BLD: 437 K/CU MM (ref 140–440)
PMV BLD AUTO: 10.2 FL (ref 7.5–11.1)
POTASSIUM SERPL-SCNC: 4.8 MMOL/L (ref 3.5–5.1)
PROTHROMBIN TIME: 14.7 SECONDS (ref 11.7–14.5)
RBC # BLD: 3.45 M/CU MM (ref 4.2–5.4)
SEGMENTED NEUTROPHILS ABSOLUTE COUNT: 13.4 K/CU MM
SEGMENTED NEUTROPHILS RELATIVE PERCENT: 86.7 % (ref 36–66)
SODIUM BLD-SCNC: 139 MMOL/L (ref 135–145)
TOTAL IMMATURE NEUTOROPHIL: 0.16 K/CU MM
TOTAL NUCLEATED RBC: 0 K/CU MM
TOTAL PROTEIN: 6.6 GM/DL (ref 6.4–8.2)
WBC # BLD: 15.5 K/CU MM (ref 4–10.5)

## 2019-10-31 PROCEDURE — 2580000003 HC RX 258: Performed by: EMERGENCY MEDICINE

## 2019-10-31 PROCEDURE — 6360000002 HC RX W HCPCS: Performed by: INTERNAL MEDICINE

## 2019-10-31 PROCEDURE — 36415 COLL VENOUS BLD VENIPUNCTURE: CPT

## 2019-10-31 PROCEDURE — 73630 X-RAY EXAM OF FOOT: CPT

## 2019-10-31 PROCEDURE — 80053 COMPREHEN METABOLIC PANEL: CPT

## 2019-10-31 PROCEDURE — 93926 LOWER EXTREMITY STUDY: CPT

## 2019-10-31 PROCEDURE — 85730 THROMBOPLASTIN TIME PARTIAL: CPT

## 2019-10-31 PROCEDURE — 2000000000 HC ICU R&B

## 2019-10-31 PROCEDURE — 85610 PROTHROMBIN TIME: CPT

## 2019-10-31 PROCEDURE — 85025 COMPLETE CBC W/AUTO DIFF WBC: CPT

## 2019-10-31 PROCEDURE — 96375 TX/PRO/DX INJ NEW DRUG ADDON: CPT

## 2019-10-31 PROCEDURE — 2700000000 HC OXYGEN THERAPY PER DAY

## 2019-10-31 PROCEDURE — 6360000002 HC RX W HCPCS: Performed by: EMERGENCY MEDICINE

## 2019-10-31 PROCEDURE — 87040 BLOOD CULTURE FOR BACTERIA: CPT

## 2019-10-31 PROCEDURE — 86141 C-REACTIVE PROTEIN HS: CPT

## 2019-10-31 PROCEDURE — 96367 TX/PROPH/DG ADDL SEQ IV INF: CPT

## 2019-10-31 PROCEDURE — 2580000003 HC RX 258: Performed by: INTERNAL MEDICINE

## 2019-10-31 PROCEDURE — 99291 CRITICAL CARE FIRST HOUR: CPT | Performed by: INTERNAL MEDICINE

## 2019-10-31 PROCEDURE — 99285 EMERGENCY DEPT VISIT HI MDM: CPT

## 2019-10-31 PROCEDURE — 96365 THER/PROPH/DIAG IV INF INIT: CPT

## 2019-10-31 PROCEDURE — 94761 N-INVAS EAR/PLS OXIMETRY MLT: CPT

## 2019-10-31 PROCEDURE — 6370000000 HC RX 637 (ALT 250 FOR IP): Performed by: INTERNAL MEDICINE

## 2019-10-31 PROCEDURE — 99212 OFFICE O/P EST SF 10 MIN: CPT | Performed by: NURSE PRACTITIONER

## 2019-10-31 PROCEDURE — 99213 OFFICE O/P EST LOW 20 MIN: CPT

## 2019-10-31 RX ORDER — PREDNISONE 1 MG/1
5 TABLET ORAL DAILY
Status: DISCONTINUED | OUTPATIENT
Start: 2019-11-01 | End: 2019-11-06 | Stop reason: HOSPADM

## 2019-10-31 RX ORDER — VANCOMYCIN HYDROCHLORIDE 1 G/200ML
1000 INJECTION, SOLUTION INTRAVENOUS ONCE
Status: COMPLETED | OUTPATIENT
Start: 2019-10-31 | End: 2019-10-31

## 2019-10-31 RX ORDER — VANCOMYCIN HYDROCHLORIDE 1 G/200ML
1000 INJECTION, SOLUTION INTRAVENOUS EVERY 24 HOURS
Status: DISCONTINUED | OUTPATIENT
Start: 2019-11-01 | End: 2019-11-01 | Stop reason: DRUGHIGH

## 2019-10-31 RX ORDER — CLOPIDOGREL BISULFATE 75 MG/1
75 TABLET ORAL EVERY OTHER DAY
Status: DISCONTINUED | OUTPATIENT
Start: 2019-11-02 | End: 2019-11-06 | Stop reason: HOSPADM

## 2019-10-31 RX ORDER — HYDRALAZINE HYDROCHLORIDE 25 MG/1
25 TABLET, FILM COATED ORAL 3 TIMES DAILY
Status: DISCONTINUED | OUTPATIENT
Start: 2019-10-31 | End: 2019-11-01

## 2019-10-31 RX ORDER — HYDROCHLOROTHIAZIDE 12.5 MG/1
12.5 TABLET ORAL DAILY
Status: DISCONTINUED | OUTPATIENT
Start: 2019-11-01 | End: 2019-11-06 | Stop reason: HOSPADM

## 2019-10-31 RX ORDER — SIMVASTATIN 40 MG
40 TABLET ORAL NIGHTLY
Status: DISCONTINUED | OUTPATIENT
Start: 2019-10-31 | End: 2019-11-06 | Stop reason: HOSPADM

## 2019-10-31 RX ORDER — SIMVASTATIN 10 MG
5 TABLET ORAL NIGHTLY
Status: DISCONTINUED | OUTPATIENT
Start: 2019-10-31 | End: 2019-10-31 | Stop reason: CLARIF

## 2019-10-31 RX ORDER — LACTOSE-REDUCED FOOD
1 LIQUID (ML) ORAL 2 TIMES DAILY
Status: DISCONTINUED | OUTPATIENT
Start: 2019-10-31 | End: 2019-10-31 | Stop reason: RX

## 2019-10-31 RX ORDER — SODIUM CHLORIDE 0.9 % (FLUSH) 0.9 %
10 SYRINGE (ML) INJECTION EVERY 12 HOURS SCHEDULED
Status: DISCONTINUED | OUTPATIENT
Start: 2019-10-31 | End: 2019-11-06 | Stop reason: HOSPADM

## 2019-10-31 RX ORDER — SODIUM CHLORIDE 9 MG/ML
1000 INJECTION, SOLUTION INTRAVENOUS CONTINUOUS
Status: DISCONTINUED | OUTPATIENT
Start: 2019-10-31 | End: 2019-11-02

## 2019-10-31 RX ORDER — MECLIZINE HYDROCHLORIDE 25 MG/1
25 TABLET ORAL 2 TIMES DAILY PRN
Status: DISCONTINUED | OUTPATIENT
Start: 2019-10-31 | End: 2019-11-06 | Stop reason: HOSPADM

## 2019-10-31 RX ORDER — ATENOLOL 25 MG/1
25 TABLET ORAL DAILY
Status: DISCONTINUED | OUTPATIENT
Start: 2019-11-01 | End: 2019-11-06 | Stop reason: HOSPADM

## 2019-10-31 RX ORDER — ONDANSETRON 2 MG/ML
4 INJECTION INTRAMUSCULAR; INTRAVENOUS EVERY 6 HOURS PRN
Status: DISCONTINUED | OUTPATIENT
Start: 2019-10-31 | End: 2019-11-06 | Stop reason: HOSPADM

## 2019-10-31 RX ORDER — ONDANSETRON 2 MG/ML
4 INJECTION INTRAMUSCULAR; INTRAVENOUS EVERY 6 HOURS PRN
Status: DISCONTINUED | OUTPATIENT
Start: 2019-10-31 | End: 2019-10-31 | Stop reason: SDUPTHER

## 2019-10-31 RX ORDER — ACETAMINOPHEN 325 MG/1
325 TABLET ORAL EVERY 6 HOURS PRN
Status: DISCONTINUED | OUTPATIENT
Start: 2019-10-31 | End: 2019-11-06 | Stop reason: HOSPADM

## 2019-10-31 RX ORDER — FAMOTIDINE 20 MG/1
20 TABLET, FILM COATED ORAL DAILY
Status: DISCONTINUED | OUTPATIENT
Start: 2019-11-01 | End: 2019-11-06 | Stop reason: HOSPADM

## 2019-10-31 RX ORDER — MORPHINE SULFATE 4 MG/ML
1 INJECTION, SOLUTION INTRAMUSCULAR; INTRAVENOUS EVERY 4 HOURS PRN
Status: DISCONTINUED | OUTPATIENT
Start: 2019-10-31 | End: 2019-11-03

## 2019-10-31 RX ORDER — FENTANYL CITRATE 50 UG/ML
25 INJECTION, SOLUTION INTRAMUSCULAR; INTRAVENOUS EVERY 30 MIN PRN
Status: DISCONTINUED | OUTPATIENT
Start: 2019-10-31 | End: 2019-11-04

## 2019-10-31 RX ORDER — SODIUM CHLORIDE 0.9 % (FLUSH) 0.9 %
10 SYRINGE (ML) INJECTION PRN
Status: DISCONTINUED | OUTPATIENT
Start: 2019-10-31 | End: 2019-11-06 | Stop reason: HOSPADM

## 2019-10-31 RX ADMIN — ENOXAPARIN SODIUM 60 MG: 60 INJECTION SUBCUTANEOUS at 21:02

## 2019-10-31 RX ADMIN — HYDRALAZINE HYDROCHLORIDE 25 MG: 25 TABLET, FILM COATED ORAL at 21:02

## 2019-10-31 RX ADMIN — Medication 10 ML: at 21:02

## 2019-10-31 RX ADMIN — SODIUM CHLORIDE 1000 ML: 9 INJECTION, SOLUTION INTRAVENOUS at 20:59

## 2019-10-31 RX ADMIN — VANCOMYCIN HYDROCHLORIDE 1000 MG: 1 INJECTION, SOLUTION INTRAVENOUS at 19:10

## 2019-10-31 RX ADMIN — FENTANYL CITRATE 25 MCG: 50 INJECTION, SOLUTION INTRAMUSCULAR; INTRAVENOUS at 16:42

## 2019-10-31 RX ADMIN — CEFEPIME HYDROCHLORIDE 2 G: 2 INJECTION, POWDER, FOR SOLUTION INTRAVENOUS at 17:34

## 2019-10-31 RX ADMIN — SIMVASTATIN 40 MG: 40 TABLET, FILM COATED ORAL at 21:02

## 2019-10-31 ASSESSMENT — PAIN - FUNCTIONAL ASSESSMENT: PAIN_FUNCTIONAL_ASSESSMENT: PREVENTS OR INTERFERES SOME ACTIVE ACTIVITIES AND ADLS

## 2019-10-31 ASSESSMENT — PAIN SCALES - GENERAL
PAINLEVEL_OUTOF10: 8
PAINLEVEL_OUTOF10: 8
PAINLEVEL_OUTOF10: 9
PAINLEVEL_OUTOF10: 0

## 2019-10-31 ASSESSMENT — PAIN DESCRIPTION - ORIENTATION
ORIENTATION: RIGHT
ORIENTATION: RIGHT

## 2019-10-31 ASSESSMENT — PAIN DESCRIPTION - ONSET: ONSET: ON-GOING

## 2019-10-31 ASSESSMENT — PAIN DESCRIPTION - PAIN TYPE
TYPE: ACUTE PAIN
TYPE: ACUTE PAIN

## 2019-10-31 ASSESSMENT — PAIN DESCRIPTION - LOCATION
LOCATION: LEG;FOOT
LOCATION: LEG

## 2019-10-31 ASSESSMENT — PAIN DESCRIPTION - PROGRESSION: CLINICAL_PROGRESSION: NOT CHANGED

## 2019-10-31 ASSESSMENT — PAIN DESCRIPTION - FREQUENCY: FREQUENCY: CONTINUOUS

## 2019-10-31 ASSESSMENT — PAIN DESCRIPTION - DESCRIPTORS: DESCRIPTORS: SHOOTING;SHARP

## 2019-11-01 LAB
ANION GAP SERPL CALCULATED.3IONS-SCNC: 9 MMOL/L (ref 4–16)
BASOPHILS ABSOLUTE: 0.1 K/CU MM
BASOPHILS RELATIVE PERCENT: 0.5 % (ref 0–1)
BUN BLDV-MCNC: 19 MG/DL (ref 6–23)
CALCIUM SERPL-MCNC: 8.8 MG/DL (ref 8.3–10.6)
CHLORIDE BLD-SCNC: 108 MMOL/L (ref 99–110)
CO2: 25 MMOL/L (ref 21–32)
CREAT SERPL-MCNC: 1.3 MG/DL (ref 0.6–1.1)
DIFFERENTIAL TYPE: ABNORMAL
EOSINOPHILS ABSOLUTE: 0.2 K/CU MM
EOSINOPHILS RELATIVE PERCENT: 1.5 % (ref 0–3)
GFR AFRICAN AMERICAN: 46 ML/MIN/1.73M2
GFR NON-AFRICAN AMERICAN: 38 ML/MIN/1.73M2
GLUCOSE BLD-MCNC: 90 MG/DL (ref 70–99)
HCT VFR BLD CALC: 29 % (ref 37–47)
HEMOGLOBIN: 8.4 GM/DL (ref 12.5–16)
IMMATURE NEUTROPHIL %: 1.5 % (ref 0–0.43)
LYMPHOCYTES ABSOLUTE: 1.6 K/CU MM
LYMPHOCYTES RELATIVE PERCENT: 16.6 % (ref 24–44)
MCH RBC QN AUTO: 26.4 PG (ref 27–31)
MCHC RBC AUTO-ENTMCNC: 29 % (ref 32–36)
MCV RBC AUTO: 91.2 FL (ref 78–100)
MONOCYTES ABSOLUTE: 0.8 K/CU MM
MONOCYTES RELATIVE PERCENT: 8.2 % (ref 0–4)
NUCLEATED RBC %: 0 %
PDW BLD-RTO: 15.7 % (ref 11.7–14.9)
PLATELET # BLD: 368 K/CU MM (ref 140–440)
PMV BLD AUTO: 9.9 FL (ref 7.5–11.1)
POTASSIUM SERPL-SCNC: 4.1 MMOL/L (ref 3.5–5.1)
RBC # BLD: 3.18 M/CU MM (ref 4.2–5.4)
SEGMENTED NEUTROPHILS ABSOLUTE COUNT: 7.1 K/CU MM
SEGMENTED NEUTROPHILS RELATIVE PERCENT: 71.7 % (ref 36–66)
SODIUM BLD-SCNC: 142 MMOL/L (ref 135–145)
TOTAL IMMATURE NEUTOROPHIL: 0.15 K/CU MM
TOTAL NUCLEATED RBC: 0 K/CU MM
WBC # BLD: 9.9 K/CU MM (ref 4–10.5)

## 2019-11-01 PROCEDURE — 85025 COMPLETE CBC W/AUTO DIFF WBC: CPT

## 2019-11-01 PROCEDURE — 94761 N-INVAS EAR/PLS OXIMETRY MLT: CPT

## 2019-11-01 PROCEDURE — 2140000000 HC CCU INTERMEDIATE R&B

## 2019-11-01 PROCEDURE — 99222 1ST HOSP IP/OBS MODERATE 55: CPT | Performed by: INTERNAL MEDICINE

## 2019-11-01 PROCEDURE — 2580000003 HC RX 258: Performed by: INTERNAL MEDICINE

## 2019-11-01 PROCEDURE — 80048 BASIC METABOLIC PNL TOTAL CA: CPT

## 2019-11-01 PROCEDURE — 6360000002 HC RX W HCPCS: Performed by: INTERNAL MEDICINE

## 2019-11-01 PROCEDURE — 6370000000 HC RX 637 (ALT 250 FOR IP): Performed by: INTERNAL MEDICINE

## 2019-11-01 PROCEDURE — 99233 SBSQ HOSP IP/OBS HIGH 50: CPT | Performed by: INTERNAL MEDICINE

## 2019-11-01 PROCEDURE — APPSS60 APP SPLIT SHARED TIME 46-60 MINUTES: Performed by: NURSE PRACTITIONER

## 2019-11-01 PROCEDURE — 36415 COLL VENOUS BLD VENIPUNCTURE: CPT

## 2019-11-01 RX ORDER — HYDRALAZINE HYDROCHLORIDE 50 MG/1
50 TABLET, FILM COATED ORAL 3 TIMES DAILY
Status: DISCONTINUED | OUTPATIENT
Start: 2019-11-01 | End: 2019-11-05

## 2019-11-01 RX ORDER — ACETYLCYSTEINE 100 MG/ML
1200 SOLUTION ORAL; RESPIRATORY (INHALATION) 2 TIMES DAILY
Status: DISCONTINUED | OUTPATIENT
Start: 2019-11-01 | End: 2019-11-03

## 2019-11-01 RX ADMIN — Medication 10 ML: at 21:10

## 2019-11-01 RX ADMIN — HYDRALAZINE HYDROCHLORIDE 50 MG: 50 TABLET, FILM COATED ORAL at 09:08

## 2019-11-01 RX ADMIN — Medication 10 ML: at 09:10

## 2019-11-01 RX ADMIN — HYDROCHLOROTHIAZIDE 12.5 MG: 12.5 TABLET ORAL at 09:08

## 2019-11-01 RX ADMIN — CEFEPIME HYDROCHLORIDE 1 G: 1 INJECTION, POWDER, FOR SOLUTION INTRAMUSCULAR; INTRAVENOUS at 03:34

## 2019-11-01 RX ADMIN — SIMVASTATIN 40 MG: 40 TABLET, FILM COATED ORAL at 21:09

## 2019-11-01 RX ADMIN — ENOXAPARIN SODIUM 60 MG: 60 INJECTION SUBCUTANEOUS at 21:09

## 2019-11-01 RX ADMIN — SODIUM CHLORIDE 1000 ML: 9 INJECTION, SOLUTION INTRAVENOUS at 11:09

## 2019-11-01 RX ADMIN — ATENOLOL 25 MG: 25 TABLET ORAL at 09:08

## 2019-11-01 RX ADMIN — ACETYLCYSTEINE 1200 MG: 100 SOLUTION ORAL; RESPIRATORY (INHALATION) at 21:09

## 2019-11-01 RX ADMIN — PREDNISONE 5 MG: 5 TABLET ORAL at 09:08

## 2019-11-01 RX ADMIN — FAMOTIDINE 20 MG: 20 TABLET ORAL at 09:09

## 2019-11-01 RX ADMIN — VANCOMYCIN HYDROCHLORIDE 1000 MG: 1 INJECTION, SOLUTION INTRAVENOUS at 05:59

## 2019-11-01 ASSESSMENT — PAIN SCALES - GENERAL
PAINLEVEL_OUTOF10: 0

## 2019-11-02 LAB
ALBUMIN SERPL-MCNC: 3 GM/DL (ref 3.4–5)
ALP BLD-CCNC: 88 IU/L (ref 40–128)
ALT SERPL-CCNC: 17 U/L (ref 10–40)
ANION GAP SERPL CALCULATED.3IONS-SCNC: 11 MMOL/L (ref 4–16)
AST SERPL-CCNC: 23 IU/L (ref 15–37)
BASOPHILS ABSOLUTE: 0.1 K/CU MM
BASOPHILS RELATIVE PERCENT: 0.9 % (ref 0–1)
BILIRUB SERPL-MCNC: 0.2 MG/DL (ref 0–1)
BUN BLDV-MCNC: 21 MG/DL (ref 6–23)
CALCIUM SERPL-MCNC: 8.7 MG/DL (ref 8.3–10.6)
CHLORIDE BLD-SCNC: 106 MMOL/L (ref 99–110)
CO2: 22 MMOL/L (ref 21–32)
CREAT SERPL-MCNC: 1.2 MG/DL (ref 0.6–1.1)
DIFFERENTIAL TYPE: ABNORMAL
DOSE AMOUNT: NORMAL
DOSE TIME: NORMAL
EOSINOPHILS ABSOLUTE: 0.2 K/CU MM
EOSINOPHILS RELATIVE PERCENT: 1.7 % (ref 0–3)
GFR AFRICAN AMERICAN: 50 ML/MIN/1.73M2
GFR NON-AFRICAN AMERICAN: 42 ML/MIN/1.73M2
GLUCOSE BLD-MCNC: 108 MG/DL (ref 70–99)
HCT VFR BLD CALC: 28.6 % (ref 37–47)
HEMOGLOBIN: 8.3 GM/DL (ref 12.5–16)
IMMATURE NEUTROPHIL %: 1.1 % (ref 0–0.43)
LYMPHOCYTES ABSOLUTE: 1.7 K/CU MM
LYMPHOCYTES RELATIVE PERCENT: 19.1 % (ref 24–44)
MCH RBC QN AUTO: 26.4 PG (ref 27–31)
MCHC RBC AUTO-ENTMCNC: 29 % (ref 32–36)
MCV RBC AUTO: 91.1 FL (ref 78–100)
MONOCYTES ABSOLUTE: 0.7 K/CU MM
MONOCYTES RELATIVE PERCENT: 7.5 % (ref 0–4)
NUCLEATED RBC %: 0 %
PDW BLD-RTO: 15.9 % (ref 11.7–14.9)
PLATELET # BLD: 344 K/CU MM (ref 140–440)
PMV BLD AUTO: 10.2 FL (ref 7.5–11.1)
POTASSIUM SERPL-SCNC: 4 MMOL/L (ref 3.5–5.1)
RBC # BLD: 3.14 M/CU MM (ref 4.2–5.4)
SEGMENTED NEUTROPHILS ABSOLUTE COUNT: 6.3 K/CU MM
SEGMENTED NEUTROPHILS RELATIVE PERCENT: 69.7 % (ref 36–66)
SODIUM BLD-SCNC: 139 MMOL/L (ref 135–145)
TOTAL IMMATURE NEUTOROPHIL: 0.1 K/CU MM
TOTAL NUCLEATED RBC: 0 K/CU MM
TOTAL PROTEIN: 4.9 GM/DL (ref 6.4–8.2)
VANCOMYCIN RANDOM: 12.5 UG/ML
VANCOMYCIN RANDOM: NORMAL UG/ML
WBC # BLD: 9.1 K/CU MM (ref 4–10.5)

## 2019-11-02 PROCEDURE — 90686 IIV4 VACC NO PRSV 0.5 ML IM: CPT | Performed by: INTERNAL MEDICINE

## 2019-11-02 PROCEDURE — 80053 COMPREHEN METABOLIC PANEL: CPT

## 2019-11-02 PROCEDURE — 85025 COMPLETE CBC W/AUTO DIFF WBC: CPT

## 2019-11-02 PROCEDURE — 6360000002 HC RX W HCPCS: Performed by: INTERNAL MEDICINE

## 2019-11-02 PROCEDURE — G0008 ADMIN INFLUENZA VIRUS VAC: HCPCS | Performed by: INTERNAL MEDICINE

## 2019-11-02 PROCEDURE — 6370000000 HC RX 637 (ALT 250 FOR IP): Performed by: INTERNAL MEDICINE

## 2019-11-02 PROCEDURE — 36415 COLL VENOUS BLD VENIPUNCTURE: CPT

## 2019-11-02 PROCEDURE — 80202 ASSAY OF VANCOMYCIN: CPT

## 2019-11-02 PROCEDURE — 2140000000 HC CCU INTERMEDIATE R&B

## 2019-11-02 RX ORDER — SODIUM CHLORIDE 9 MG/ML
1000 INJECTION, SOLUTION INTRAVENOUS CONTINUOUS
Status: DISCONTINUED | OUTPATIENT
Start: 2019-11-02 | End: 2019-11-03

## 2019-11-02 RX ADMIN — INFLUENZA A VIRUS A/BRISBANE/02/2018 IVR-190 (H1N1) ANTIGEN (PROPIOLACTONE INACTIVATED), INFLUENZA A VIRUS A/KANSAS/14/2017 X-327 (H3N2) ANTIGEN (PROPIOLACTONE INACTIVATED), INFLUENZA B VIRUS B/MARYLAND/15/2016 ANTIGEN (PROPIOLACTONE INACTIVATED), INFLUENZA B VIRUS B/PHUKET/3073/2013 BVR-1B ANTIGEN (PROPIOLACTONE INACTIVATED) 0.5 ML: 15; 15; 15; 15 INJECTION, SUSPENSION INTRAMUSCULAR at 09:39

## 2019-11-02 RX ADMIN — HYDRALAZINE HYDROCHLORIDE 50 MG: 50 TABLET, FILM COATED ORAL at 22:10

## 2019-11-02 RX ADMIN — MORPHINE SULFATE 1 MG: 4 INJECTION, SOLUTION INTRAMUSCULAR; INTRAVENOUS at 22:26

## 2019-11-02 RX ADMIN — FAMOTIDINE 20 MG: 20 TABLET ORAL at 09:39

## 2019-11-02 RX ADMIN — HYDRALAZINE HYDROCHLORIDE 50 MG: 50 TABLET, FILM COATED ORAL at 09:38

## 2019-11-02 RX ADMIN — CLOPIDOGREL BISULFATE 75 MG: 75 TABLET ORAL at 09:38

## 2019-11-02 RX ADMIN — PREDNISONE 5 MG: 5 TABLET ORAL at 09:39

## 2019-11-02 RX ADMIN — ENOXAPARIN SODIUM 60 MG: 60 INJECTION SUBCUTANEOUS at 22:10

## 2019-11-02 RX ADMIN — ATENOLOL 25 MG: 25 TABLET ORAL at 09:39

## 2019-11-02 RX ADMIN — MORPHINE SULFATE 1 MG: 4 INJECTION, SOLUTION INTRAMUSCULAR; INTRAVENOUS at 17:43

## 2019-11-02 RX ADMIN — HYDROCHLOROTHIAZIDE 12.5 MG: 12.5 TABLET ORAL at 09:38

## 2019-11-02 RX ADMIN — SIMVASTATIN 40 MG: 40 TABLET, FILM COATED ORAL at 22:10

## 2019-11-02 ASSESSMENT — PAIN DESCRIPTION - LOCATION: LOCATION: FOOT;LEG

## 2019-11-02 ASSESSMENT — PAIN DESCRIPTION - FREQUENCY: FREQUENCY: CONTINUOUS

## 2019-11-02 ASSESSMENT — PAIN SCALES - GENERAL
PAINLEVEL_OUTOF10: 0
PAINLEVEL_OUTOF10: 5
PAINLEVEL_OUTOF10: 0
PAINLEVEL_OUTOF10: 0
PAINLEVEL_OUTOF10: 5
PAINLEVEL_OUTOF10: 0
PAINLEVEL_OUTOF10: 0
PAINLEVEL_OUTOF10: 9

## 2019-11-02 ASSESSMENT — PAIN DESCRIPTION - PAIN TYPE: TYPE: ACUTE PAIN

## 2019-11-02 ASSESSMENT — PAIN DESCRIPTION - DESCRIPTORS: DESCRIPTORS: ACHING;CONSTANT;DISCOMFORT

## 2019-11-02 ASSESSMENT — PAIN DESCRIPTION - ORIENTATION: ORIENTATION: RIGHT;LEFT

## 2019-11-03 LAB
ERYTHROCYTE SEDIMENTATION RATE: 29 MM/HR (ref 0–30)
PROCALCITONIN: 0.19

## 2019-11-03 PROCEDURE — 6360000002 HC RX W HCPCS: Performed by: GENERAL PRACTICE

## 2019-11-03 PROCEDURE — 2140000000 HC CCU INTERMEDIATE R&B

## 2019-11-03 PROCEDURE — 6370000000 HC RX 637 (ALT 250 FOR IP): Performed by: INTERNAL MEDICINE

## 2019-11-03 PROCEDURE — 6360000002 HC RX W HCPCS: Performed by: INTERNAL MEDICINE

## 2019-11-03 PROCEDURE — 85652 RBC SED RATE AUTOMATED: CPT

## 2019-11-03 PROCEDURE — 94761 N-INVAS EAR/PLS OXIMETRY MLT: CPT

## 2019-11-03 PROCEDURE — 36415 COLL VENOUS BLD VENIPUNCTURE: CPT

## 2019-11-03 PROCEDURE — 2580000003 HC RX 258: Performed by: INTERNAL MEDICINE

## 2019-11-03 PROCEDURE — 84145 PROCALCITONIN (PCT): CPT

## 2019-11-03 RX ORDER — HYDROXYZINE PAMOATE 25 MG/1
25 CAPSULE ORAL 3 TIMES DAILY PRN
Status: DISCONTINUED | OUTPATIENT
Start: 2019-11-03 | End: 2019-11-06 | Stop reason: HOSPADM

## 2019-11-03 RX ORDER — SODIUM CHLORIDE 9 MG/ML
1000 INJECTION, SOLUTION INTRAVENOUS CONTINUOUS
Status: DISPENSED | OUTPATIENT
Start: 2019-11-03 | End: 2019-11-05

## 2019-11-03 RX ADMIN — SIMVASTATIN 40 MG: 40 TABLET, FILM COATED ORAL at 21:52

## 2019-11-03 RX ADMIN — ATENOLOL 25 MG: 25 TABLET ORAL at 09:22

## 2019-11-03 RX ADMIN — HYDROMORPHONE HYDROCHLORIDE 0.5 MG: 1 INJECTION, SOLUTION INTRAMUSCULAR; INTRAVENOUS; SUBCUTANEOUS at 10:54

## 2019-11-03 RX ADMIN — HYDRALAZINE HYDROCHLORIDE 50 MG: 50 TABLET, FILM COATED ORAL at 21:52

## 2019-11-03 RX ADMIN — SODIUM CHLORIDE 1000 ML: 9 INJECTION, SOLUTION INTRAVENOUS at 09:22

## 2019-11-03 RX ADMIN — ENOXAPARIN SODIUM 60 MG: 60 INJECTION SUBCUTANEOUS at 21:52

## 2019-11-03 RX ADMIN — HYDROCHLOROTHIAZIDE 12.5 MG: 12.5 TABLET ORAL at 09:22

## 2019-11-03 RX ADMIN — FAMOTIDINE 20 MG: 20 TABLET ORAL at 09:22

## 2019-11-03 RX ADMIN — HYDRALAZINE HYDROCHLORIDE 50 MG: 50 TABLET, FILM COATED ORAL at 09:22

## 2019-11-03 RX ADMIN — HYDROMORPHONE HYDROCHLORIDE 0.5 MG: 1 INJECTION, SOLUTION INTRAMUSCULAR; INTRAVENOUS; SUBCUTANEOUS at 18:18

## 2019-11-03 RX ADMIN — HYDRALAZINE HYDROCHLORIDE 50 MG: 50 TABLET, FILM COATED ORAL at 15:19

## 2019-11-03 RX ADMIN — HYDROMORPHONE HYDROCHLORIDE 0.5 MG: 1 INJECTION, SOLUTION INTRAMUSCULAR; INTRAVENOUS; SUBCUTANEOUS at 21:53

## 2019-11-03 RX ADMIN — PREDNISONE 5 MG: 5 TABLET ORAL at 09:22

## 2019-11-03 ASSESSMENT — PAIN SCALES - GENERAL
PAINLEVEL_OUTOF10: 10
PAINLEVEL_OUTOF10: 10
PAINLEVEL_OUTOF10: 6
PAINLEVEL_OUTOF10: 0

## 2019-11-03 ASSESSMENT — PAIN DESCRIPTION - LOCATION: LOCATION: FOOT;LEG

## 2019-11-03 ASSESSMENT — PAIN DESCRIPTION - PAIN TYPE: TYPE: ACUTE PAIN

## 2019-11-03 ASSESSMENT — PAIN DESCRIPTION - ONSET: ONSET: ON-GOING

## 2019-11-03 ASSESSMENT — PAIN DESCRIPTION - ORIENTATION: ORIENTATION: RIGHT;LEFT

## 2019-11-03 ASSESSMENT — PAIN DESCRIPTION - FREQUENCY: FREQUENCY: CONTINUOUS

## 2019-11-03 ASSESSMENT — PAIN DESCRIPTION - DESCRIPTORS: DESCRIPTORS: ACHING;DISCOMFORT;SORE;THROBBING

## 2019-11-04 LAB
ACTIVATED CLOTTING TIME, LOW RANGE: 157 SEC
ACTIVATED CLOTTING TIME, LOW RANGE: 201 SEC
ACTIVATED CLOTTING TIME, LOW RANGE: 277 SEC
ALBUMIN SERPL-MCNC: 2.7 GM/DL (ref 3.4–5)
ALP BLD-CCNC: 77 IU/L (ref 40–129)
ALT SERPL-CCNC: 14 U/L (ref 10–40)
ANION GAP SERPL CALCULATED.3IONS-SCNC: 7 MMOL/L (ref 4–16)
AST SERPL-CCNC: 20 IU/L (ref 15–37)
BASOPHILS ABSOLUTE: 0.1 K/CU MM
BASOPHILS RELATIVE PERCENT: 0.9 % (ref 0–1)
BILIRUB SERPL-MCNC: 0.2 MG/DL (ref 0–1)
BUN BLDV-MCNC: 17 MG/DL (ref 6–23)
CALCIUM SERPL-MCNC: 8.5 MG/DL (ref 8.3–10.6)
CHLORIDE BLD-SCNC: 105 MMOL/L (ref 99–110)
CO2: 26 MMOL/L (ref 21–32)
CREAT SERPL-MCNC: 1.3 MG/DL (ref 0.6–1.1)
DIFFERENTIAL TYPE: ABNORMAL
EOSINOPHILS ABSOLUTE: 0.2 K/CU MM
EOSINOPHILS RELATIVE PERCENT: 1.9 % (ref 0–3)
GFR AFRICAN AMERICAN: 46 ML/MIN/1.73M2
GFR NON-AFRICAN AMERICAN: 38 ML/MIN/1.73M2
GLUCOSE BLD-MCNC: 96 MG/DL (ref 70–99)
HCT VFR BLD CALC: 26.9 % (ref 37–47)
HEMOGLOBIN: 8.1 GM/DL (ref 12.5–16)
IMMATURE NEUTROPHIL %: 0.9 % (ref 0–0.43)
LYMPHOCYTES ABSOLUTE: 1.8 K/CU MM
LYMPHOCYTES RELATIVE PERCENT: 21.7 % (ref 24–44)
MAGNESIUM: 2 MG/DL (ref 1.8–2.4)
MCH RBC QN AUTO: 26.9 PG (ref 27–31)
MCHC RBC AUTO-ENTMCNC: 30.1 % (ref 32–36)
MCV RBC AUTO: 89.4 FL (ref 78–100)
MONOCYTES ABSOLUTE: 0.7 K/CU MM
MONOCYTES RELATIVE PERCENT: 8.9 % (ref 0–4)
NUCLEATED RBC %: 0 %
PDW BLD-RTO: 15.9 % (ref 11.7–14.9)
PLATELET # BLD: 329 K/CU MM (ref 140–440)
PMV BLD AUTO: 9.8 FL (ref 7.5–11.1)
POTASSIUM SERPL-SCNC: 3.8 MMOL/L (ref 3.5–5.1)
RBC # BLD: 3.01 M/CU MM (ref 4.2–5.4)
SEGMENTED NEUTROPHILS ABSOLUTE COUNT: 5.4 K/CU MM
SEGMENTED NEUTROPHILS RELATIVE PERCENT: 65.7 % (ref 36–66)
SODIUM BLD-SCNC: 138 MMOL/L (ref 135–145)
TOTAL IMMATURE NEUTOROPHIL: 0.07 K/CU MM
TOTAL NUCLEATED RBC: 0 K/CU MM
TOTAL PROTEIN: 4.5 GM/DL (ref 6.4–8.2)
WBC # BLD: 8.2 K/CU MM (ref 4–10.5)

## 2019-11-04 PROCEDURE — 85347 COAGULATION TIME ACTIVATED: CPT

## 2019-11-04 PROCEDURE — 2500000003 HC RX 250 WO HCPCS

## 2019-11-04 PROCEDURE — 047M3ZZ DILATION OF RIGHT POPLITEAL ARTERY, PERCUTANEOUS APPROACH: ICD-10-PCS | Performed by: SURGERY

## 2019-11-04 PROCEDURE — 6360000002 HC RX W HCPCS: Performed by: GENERAL PRACTICE

## 2019-11-04 PROCEDURE — C1725 CATH, TRANSLUMIN NON-LASER: HCPCS

## 2019-11-04 PROCEDURE — 94761 N-INVAS EAR/PLS OXIMETRY MLT: CPT

## 2019-11-04 PROCEDURE — 6370000000 HC RX 637 (ALT 250 FOR IP)

## 2019-11-04 PROCEDURE — 85025 COMPLETE CBC W/AUTO DIFF WBC: CPT

## 2019-11-04 PROCEDURE — C1724 CATH, TRANS ATHEREC,ROTATION: HCPCS

## 2019-11-04 PROCEDURE — 75710 ARTERY X-RAYS ARM/LEG: CPT

## 2019-11-04 PROCEDURE — 2140000000 HC CCU INTERMEDIATE R&B

## 2019-11-04 PROCEDURE — C1769 GUIDE WIRE: HCPCS

## 2019-11-04 PROCEDURE — 6370000000 HC RX 637 (ALT 250 FOR IP): Performed by: SURGERY

## 2019-11-04 PROCEDURE — 6360000002 HC RX W HCPCS

## 2019-11-04 PROCEDURE — C1894 INTRO/SHEATH, NON-LASER: HCPCS

## 2019-11-04 PROCEDURE — 36415 COLL VENOUS BLD VENIPUNCTURE: CPT

## 2019-11-04 PROCEDURE — 99232 SBSQ HOSP IP/OBS MODERATE 35: CPT | Performed by: INTERNAL MEDICINE

## 2019-11-04 PROCEDURE — 83735 ASSAY OF MAGNESIUM: CPT

## 2019-11-04 PROCEDURE — C1887 CATHETER, GUIDING: HCPCS

## 2019-11-04 PROCEDURE — 6360000002 HC RX W HCPCS: Performed by: SURGERY

## 2019-11-04 PROCEDURE — 37225 HC FEM POP TERRITORY ATHERECTOMY: CPT

## 2019-11-04 PROCEDURE — 2709999900 HC NON-CHARGEABLE SUPPLY

## 2019-11-04 PROCEDURE — 2580000003 HC RX 258

## 2019-11-04 PROCEDURE — 04CM3ZZ EXTIRPATION OF MATTER FROM RIGHT POPLITEAL ARTERY, PERCUTANEOUS APPROACH: ICD-10-PCS | Performed by: SURGERY

## 2019-11-04 PROCEDURE — C1884 EMBOLIZATION PROTECT SYST: HCPCS

## 2019-11-04 PROCEDURE — 80053 COMPREHEN METABOLIC PANEL: CPT

## 2019-11-04 PROCEDURE — 6360000004 HC RX CONTRAST MEDICATION

## 2019-11-04 PROCEDURE — B41F1ZZ FLUOROSCOPY OF RIGHT LOWER EXTREMITY ARTERIES USING LOW OSMOLAR CONTRAST: ICD-10-PCS | Performed by: SURGERY

## 2019-11-04 PROCEDURE — 99231 SBSQ HOSP IP/OBS SF/LOW 25: CPT | Performed by: NURSE PRACTITIONER

## 2019-11-04 RX ORDER — TRAMADOL HYDROCHLORIDE 50 MG/1
50 TABLET ORAL EVERY 6 HOURS PRN
Status: DISCONTINUED | OUTPATIENT
Start: 2019-11-04 | End: 2019-11-06 | Stop reason: HOSPADM

## 2019-11-04 RX ORDER — ATROPINE SULFATE 0.1 MG/ML
INJECTION INTRAVENOUS
Status: DISCONTINUED
Start: 2019-11-04 | End: 2019-11-04 | Stop reason: WASHOUT

## 2019-11-04 RX ORDER — ACETAMINOPHEN 325 MG/1
650 TABLET ORAL EVERY 4 HOURS PRN
Status: DISCONTINUED | OUTPATIENT
Start: 2019-11-04 | End: 2019-11-06 | Stop reason: HOSPADM

## 2019-11-04 RX ORDER — FENTANYL CITRATE 50 UG/ML
12.5 INJECTION, SOLUTION INTRAMUSCULAR; INTRAVENOUS EVERY 30 MIN PRN
Status: DISCONTINUED | OUTPATIENT
Start: 2019-11-04 | End: 2019-11-06 | Stop reason: HOSPADM

## 2019-11-04 RX ADMIN — ENOXAPARIN SODIUM 60 MG: 60 INJECTION SUBCUTANEOUS at 22:17

## 2019-11-04 RX ADMIN — HYDROMORPHONE HYDROCHLORIDE 0.5 MG: 1 INJECTION, SOLUTION INTRAMUSCULAR; INTRAVENOUS; SUBCUTANEOUS at 22:17

## 2019-11-04 RX ADMIN — HYDROMORPHONE HYDROCHLORIDE 0.5 MG: 1 INJECTION, SOLUTION INTRAMUSCULAR; INTRAVENOUS; SUBCUTANEOUS at 06:37

## 2019-11-04 RX ADMIN — SIMVASTATIN 40 MG: 40 TABLET, FILM COATED ORAL at 22:17

## 2019-11-04 RX ADMIN — HYDROMORPHONE HYDROCHLORIDE 0.5 MG: 1 INJECTION, SOLUTION INTRAMUSCULAR; INTRAVENOUS; SUBCUTANEOUS at 14:44

## 2019-11-04 ASSESSMENT — PAIN SCALES - GENERAL
PAINLEVEL_OUTOF10: 8
PAINLEVEL_OUTOF10: 7
PAINLEVEL_OUTOF10: 2
PAINLEVEL_OUTOF10: 7
PAINLEVEL_OUTOF10: 4
PAINLEVEL_OUTOF10: 5
PAINLEVEL_OUTOF10: 4
PAINLEVEL_OUTOF10: 5

## 2019-11-04 ASSESSMENT — PAIN DESCRIPTION - PAIN TYPE
TYPE: ACUTE PAIN
TYPE: ACUTE PAIN

## 2019-11-04 ASSESSMENT — PAIN DESCRIPTION - DESCRIPTORS
DESCRIPTORS: ACHING
DESCRIPTORS: ACHING

## 2019-11-04 ASSESSMENT — PAIN DESCRIPTION - ORIENTATION
ORIENTATION: LEFT
ORIENTATION: LEFT

## 2019-11-04 ASSESSMENT — PAIN DESCRIPTION - LOCATION
LOCATION: FOOT
LOCATION: FOOT

## 2019-11-04 ASSESSMENT — PAIN DESCRIPTION - FREQUENCY: FREQUENCY: CONTINUOUS

## 2019-11-05 LAB
ANION GAP SERPL CALCULATED.3IONS-SCNC: 11 MMOL/L (ref 4–16)
BASOPHILS ABSOLUTE: 0.1 K/CU MM
BASOPHILS RELATIVE PERCENT: 0.8 % (ref 0–1)
BUN BLDV-MCNC: 20 MG/DL (ref 6–23)
CALCIUM SERPL-MCNC: 8.5 MG/DL (ref 8.3–10.6)
CHLORIDE BLD-SCNC: 105 MMOL/L (ref 99–110)
CO2: 24 MMOL/L (ref 21–32)
CREAT SERPL-MCNC: 1.2 MG/DL (ref 0.6–1.1)
CULTURE: NORMAL
CULTURE: NORMAL
DIFFERENTIAL TYPE: ABNORMAL
EOSINOPHILS ABSOLUTE: 0.1 K/CU MM
EOSINOPHILS RELATIVE PERCENT: 0.9 % (ref 0–3)
GFR AFRICAN AMERICAN: 50 ML/MIN/1.73M2
GFR NON-AFRICAN AMERICAN: 42 ML/MIN/1.73M2
GLUCOSE BLD-MCNC: 106 MG/DL (ref 70–99)
HCT VFR BLD CALC: 27.5 % (ref 37–47)
HEMOGLOBIN: 8 GM/DL (ref 12.5–16)
IMMATURE NEUTROPHIL %: 0.7 % (ref 0–0.43)
LYMPHOCYTES ABSOLUTE: 1.3 K/CU MM
LYMPHOCYTES RELATIVE PERCENT: 13.7 % (ref 24–44)
Lab: NORMAL
Lab: NORMAL
MCH RBC QN AUTO: 26.2 PG (ref 27–31)
MCHC RBC AUTO-ENTMCNC: 29.1 % (ref 32–36)
MCV RBC AUTO: 90.2 FL (ref 78–100)
MONOCYTES ABSOLUTE: 0.7 K/CU MM
MONOCYTES RELATIVE PERCENT: 7.3 % (ref 0–4)
NUCLEATED RBC %: 0 %
PDW BLD-RTO: 15.9 % (ref 11.7–14.9)
PLATELET # BLD: 326 K/CU MM (ref 140–440)
PMV BLD AUTO: 10.2 FL (ref 7.5–11.1)
POTASSIUM SERPL-SCNC: 4.1 MMOL/L (ref 3.5–5.1)
RBC # BLD: 3.05 M/CU MM (ref 4.2–5.4)
SEGMENTED NEUTROPHILS ABSOLUTE COUNT: 7.5 K/CU MM
SEGMENTED NEUTROPHILS RELATIVE PERCENT: 76.6 % (ref 36–66)
SODIUM BLD-SCNC: 140 MMOL/L (ref 135–145)
SPECIMEN: NORMAL
SPECIMEN: NORMAL
TOTAL IMMATURE NEUTOROPHIL: 0.07 K/CU MM
TOTAL NUCLEATED RBC: 0 K/CU MM
WBC # BLD: 9.8 K/CU MM (ref 4–10.5)

## 2019-11-05 PROCEDURE — 97167 OT EVAL HIGH COMPLEX 60 MIN: CPT

## 2019-11-05 PROCEDURE — 2580000003 HC RX 258: Performed by: SURGERY

## 2019-11-05 PROCEDURE — 6370000000 HC RX 637 (ALT 250 FOR IP): Performed by: SURGERY

## 2019-11-05 PROCEDURE — 97530 THERAPEUTIC ACTIVITIES: CPT

## 2019-11-05 PROCEDURE — 85025 COMPLETE CBC W/AUTO DIFF WBC: CPT

## 2019-11-05 PROCEDURE — 80048 BASIC METABOLIC PNL TOTAL CA: CPT

## 2019-11-05 PROCEDURE — 99231 SBSQ HOSP IP/OBS SF/LOW 25: CPT | Performed by: NURSE PRACTITIONER

## 2019-11-05 PROCEDURE — 97163 PT EVAL HIGH COMPLEX 45 MIN: CPT

## 2019-11-05 PROCEDURE — 94761 N-INVAS EAR/PLS OXIMETRY MLT: CPT

## 2019-11-05 PROCEDURE — 2140000000 HC CCU INTERMEDIATE R&B

## 2019-11-05 PROCEDURE — 6360000002 HC RX W HCPCS: Performed by: SURGERY

## 2019-11-05 PROCEDURE — 6370000000 HC RX 637 (ALT 250 FOR IP): Performed by: INTERNAL MEDICINE

## 2019-11-05 PROCEDURE — 99232 SBSQ HOSP IP/OBS MODERATE 35: CPT | Performed by: INTERNAL MEDICINE

## 2019-11-05 PROCEDURE — 36415 COLL VENOUS BLD VENIPUNCTURE: CPT

## 2019-11-05 RX ORDER — HYDRALAZINE HYDROCHLORIDE 25 MG/1
25 TABLET, FILM COATED ORAL 3 TIMES DAILY
Status: DISCONTINUED | OUTPATIENT
Start: 2019-11-05 | End: 2019-11-06

## 2019-11-05 RX ADMIN — PREDNISONE 5 MG: 5 TABLET ORAL at 08:02

## 2019-11-05 RX ADMIN — ENOXAPARIN SODIUM 60 MG: 60 INJECTION SUBCUTANEOUS at 21:40

## 2019-11-05 RX ADMIN — ATENOLOL 25 MG: 25 TABLET ORAL at 08:02

## 2019-11-05 RX ADMIN — TRAMADOL HYDROCHLORIDE 50 MG: 50 TABLET, FILM COATED ORAL at 16:00

## 2019-11-05 RX ADMIN — HYDROCHLOROTHIAZIDE 12.5 MG: 12.5 TABLET ORAL at 08:02

## 2019-11-05 RX ADMIN — FAMOTIDINE 20 MG: 20 TABLET ORAL at 08:02

## 2019-11-05 RX ADMIN — HYDROMORPHONE HYDROCHLORIDE 0.5 MG: 1 INJECTION, SOLUTION INTRAMUSCULAR; INTRAVENOUS; SUBCUTANEOUS at 09:16

## 2019-11-05 RX ADMIN — Medication 10 ML: at 21:40

## 2019-11-05 RX ADMIN — SIMVASTATIN 40 MG: 40 TABLET, FILM COATED ORAL at 21:40

## 2019-11-05 RX ADMIN — Medication 10 ML: at 08:02

## 2019-11-05 RX ADMIN — HYDRALAZINE HYDROCHLORIDE 25 MG: 25 TABLET, FILM COATED ORAL at 21:40

## 2019-11-05 ASSESSMENT — PAIN SCALES - GENERAL
PAINLEVEL_OUTOF10: 0
PAINLEVEL_OUTOF10: 5
PAINLEVEL_OUTOF10: 0
PAINLEVEL_OUTOF10: 5
PAINLEVEL_OUTOF10: 8

## 2019-11-06 VITALS
OXYGEN SATURATION: 97 % | TEMPERATURE: 98 F | SYSTOLIC BLOOD PRESSURE: 129 MMHG | HEART RATE: 56 BPM | RESPIRATION RATE: 18 BRPM | HEIGHT: 66 IN | DIASTOLIC BLOOD PRESSURE: 50 MMHG | BODY MASS INDEX: 21.05 KG/M2 | WEIGHT: 131 LBS

## 2019-11-06 LAB
ALBUMIN SERPL-MCNC: 2.9 GM/DL (ref 3.4–5)
ALP BLD-CCNC: 72 IU/L (ref 40–128)
ALT SERPL-CCNC: 13 U/L (ref 10–40)
ANION GAP SERPL CALCULATED.3IONS-SCNC: 10 MMOL/L (ref 4–16)
AST SERPL-CCNC: 22 IU/L (ref 15–37)
BASOPHILS ABSOLUTE: 0.1 K/CU MM
BASOPHILS RELATIVE PERCENT: 0.8 % (ref 0–1)
BILIRUB SERPL-MCNC: 0.3 MG/DL (ref 0–1)
BUN BLDV-MCNC: 19 MG/DL (ref 6–23)
CALCIUM SERPL-MCNC: 8.5 MG/DL (ref 8.3–10.6)
CHLORIDE BLD-SCNC: 106 MMOL/L (ref 99–110)
CO2: 25 MMOL/L (ref 21–32)
CREAT SERPL-MCNC: 1.2 MG/DL (ref 0.6–1.1)
DIFFERENTIAL TYPE: ABNORMAL
EOSINOPHILS ABSOLUTE: 0.1 K/CU MM
EOSINOPHILS RELATIVE PERCENT: 1.4 % (ref 0–3)
GFR AFRICAN AMERICAN: 50 ML/MIN/1.73M2
GFR NON-AFRICAN AMERICAN: 42 ML/MIN/1.73M2
GLUCOSE BLD-MCNC: 112 MG/DL (ref 70–99)
HCT VFR BLD CALC: 26.1 % (ref 37–47)
HEMOGLOBIN: 7.7 GM/DL (ref 12.5–16)
IMMATURE NEUTROPHIL %: 0.5 % (ref 0–0.43)
INR BLD: 1.38 INDEX
LYMPHOCYTES ABSOLUTE: 1.3 K/CU MM
LYMPHOCYTES RELATIVE PERCENT: 16.1 % (ref 24–44)
MCH RBC QN AUTO: 26.4 PG (ref 27–31)
MCHC RBC AUTO-ENTMCNC: 29.5 % (ref 32–36)
MCV RBC AUTO: 89.4 FL (ref 78–100)
MONOCYTES ABSOLUTE: 0.7 K/CU MM
MONOCYTES RELATIVE PERCENT: 9.1 % (ref 0–4)
NUCLEATED RBC %: 0 %
PDW BLD-RTO: 15.7 % (ref 11.7–14.9)
PLATELET # BLD: 268 K/CU MM (ref 140–440)
PMV BLD AUTO: 10.5 FL (ref 7.5–11.1)
POTASSIUM SERPL-SCNC: 3.7 MMOL/L (ref 3.5–5.1)
PROTHROMBIN TIME: 16.1 SECONDS (ref 11.7–14.5)
RBC # BLD: 2.92 M/CU MM (ref 4.2–5.4)
SEGMENTED NEUTROPHILS ABSOLUTE COUNT: 5.7 K/CU MM
SEGMENTED NEUTROPHILS RELATIVE PERCENT: 72.1 % (ref 36–66)
SODIUM BLD-SCNC: 141 MMOL/L (ref 135–145)
TOTAL IMMATURE NEUTOROPHIL: 0.04 K/CU MM
TOTAL NUCLEATED RBC: 0 K/CU MM
TOTAL PROTEIN: 4.6 GM/DL (ref 6.4–8.2)
WBC # BLD: 7.9 K/CU MM (ref 4–10.5)

## 2019-11-06 PROCEDURE — 6370000000 HC RX 637 (ALT 250 FOR IP): Performed by: SURGERY

## 2019-11-06 PROCEDURE — 85610 PROTHROMBIN TIME: CPT

## 2019-11-06 PROCEDURE — 2580000003 HC RX 258: Performed by: SURGERY

## 2019-11-06 PROCEDURE — 36415 COLL VENOUS BLD VENIPUNCTURE: CPT

## 2019-11-06 PROCEDURE — 80053 COMPREHEN METABOLIC PANEL: CPT

## 2019-11-06 PROCEDURE — 99239 HOSP IP/OBS DSCHRG MGMT >30: CPT | Performed by: INTERNAL MEDICINE

## 2019-11-06 PROCEDURE — 85025 COMPLETE CBC W/AUTO DIFF WBC: CPT

## 2019-11-06 RX ORDER — HYDRALAZINE HYDROCHLORIDE 50 MG/1
50 TABLET, FILM COATED ORAL 3 TIMES DAILY
Qty: 90 TABLET | Refills: 3
Start: 2019-11-06 | End: 2019-12-13 | Stop reason: SDUPTHER

## 2019-11-06 RX ORDER — HYDRALAZINE HYDROCHLORIDE 50 MG/1
50 TABLET, FILM COATED ORAL 3 TIMES DAILY
Status: DISCONTINUED | OUTPATIENT
Start: 2019-11-06 | End: 2019-11-06 | Stop reason: HOSPADM

## 2019-11-06 RX ORDER — HYDROCHLOROTHIAZIDE 12.5 MG/1
12.5 TABLET ORAL DAILY
Qty: 30 TABLET | Refills: 3
Start: 2019-11-07 | End: 2019-12-20 | Stop reason: SDUPTHER

## 2019-11-06 RX ORDER — WARFARIN SODIUM 2 MG/1
2 TABLET ORAL DAILY
Status: DISCONTINUED | OUTPATIENT
Start: 2019-11-06 | End: 2019-11-06 | Stop reason: HOSPADM

## 2019-11-06 RX ADMIN — Medication 10 ML: at 08:12

## 2019-11-06 RX ADMIN — HYDROCHLOROTHIAZIDE 12.5 MG: 12.5 TABLET ORAL at 09:15

## 2019-11-06 RX ADMIN — ATENOLOL 25 MG: 25 TABLET ORAL at 09:15

## 2019-11-06 RX ADMIN — PREDNISONE 5 MG: 5 TABLET ORAL at 09:15

## 2019-11-06 RX ADMIN — FAMOTIDINE 20 MG: 20 TABLET ORAL at 09:15

## 2019-11-06 RX ADMIN — TRAMADOL HYDROCHLORIDE 50 MG: 50 TABLET, FILM COATED ORAL at 08:00

## 2019-11-06 RX ADMIN — CLOPIDOGREL BISULFATE 75 MG: 75 TABLET ORAL at 09:15

## 2019-11-06 RX ADMIN — TRAMADOL HYDROCHLORIDE 50 MG: 50 TABLET, FILM COATED ORAL at 01:49

## 2019-11-06 ASSESSMENT — PAIN DESCRIPTION - FREQUENCY: FREQUENCY: CONTINUOUS

## 2019-11-06 ASSESSMENT — PAIN DESCRIPTION - PROGRESSION
CLINICAL_PROGRESSION: NOT CHANGED

## 2019-11-06 ASSESSMENT — PAIN SCALES - WONG BAKER
WONGBAKER_NUMERICALRESPONSE: 0

## 2019-11-06 ASSESSMENT — PAIN DESCRIPTION - LOCATION: LOCATION: FOOT

## 2019-11-06 ASSESSMENT — PAIN SCALES - GENERAL
PAINLEVEL_OUTOF10: 7
PAINLEVEL_OUTOF10: 10
PAINLEVEL_OUTOF10: 0

## 2019-11-06 ASSESSMENT — PAIN DESCRIPTION - PAIN TYPE: TYPE: ACUTE PAIN

## 2019-11-06 ASSESSMENT — PAIN DESCRIPTION - ORIENTATION: ORIENTATION: RIGHT

## 2019-11-12 ENCOUNTER — ANTI-COAG VISIT (OUTPATIENT)
Dept: INTERNAL MEDICINE CLINIC | Age: 84
End: 2019-11-12

## 2019-11-13 ENCOUNTER — CARE COORDINATION (OUTPATIENT)
Dept: CASE MANAGEMENT | Age: 84
End: 2019-11-13

## 2019-11-14 ENCOUNTER — HOSPITAL ENCOUNTER (OUTPATIENT)
Dept: WOUND CARE | Age: 84
Discharge: HOME | End: 2019-11-14
Payer: COMMERCIAL

## 2019-11-14 VITALS
TEMPERATURE: 98.7 F | DIASTOLIC BLOOD PRESSURE: 48 MMHG | RESPIRATION RATE: 17 BRPM | HEART RATE: 68 BPM | SYSTOLIC BLOOD PRESSURE: 112 MMHG

## 2019-11-14 DIAGNOSIS — I73.9 GANGRENE DUE TO PERIPHERAL VASCULAR DISEASE (HCC): ICD-10-CM

## 2019-11-14 DIAGNOSIS — L97.519: ICD-10-CM

## 2019-11-14 PROBLEM — L97.513 SKIN ULCER OF RIGHT FOOT WITH NECROSIS OF MUSCLE (HCC): Status: ACTIVE | Noted: 2019-10-31

## 2019-11-14 PROCEDURE — 99212 OFFICE O/P EST SF 10 MIN: CPT | Performed by: NURSE PRACTITIONER

## 2019-11-14 PROCEDURE — 99213 OFFICE O/P EST LOW 20 MIN: CPT

## 2019-11-14 RX ORDER — AMMONIUM LACTATE 12 G/100G
12 LOTION TOPICAL PRN
COMMUNITY
End: 2020-01-14 | Stop reason: ALTCHOICE

## 2019-11-14 RX ORDER — GABAPENTIN 100 MG/1
100 CAPSULE ORAL 3 TIMES DAILY
COMMUNITY
End: 2019-11-26

## 2019-11-14 RX ORDER — CEFTRIAXONE 1 G/1
1 INJECTION, POWDER, FOR SOLUTION INTRAMUSCULAR; INTRAVENOUS EVERY 24 HOURS
COMMUNITY
End: 2019-11-26

## 2019-11-14 RX ORDER — HYDROCODONE BITARTRATE AND ACETAMINOPHEN 5; 325 MG/1; MG/1
1 TABLET ORAL EVERY 6 HOURS PRN
COMMUNITY
End: 2020-01-14 | Stop reason: ALTCHOICE

## 2019-11-18 ENCOUNTER — CARE COORDINATION (OUTPATIENT)
Dept: CASE MANAGEMENT | Age: 84
End: 2019-11-18

## 2019-11-21 ENCOUNTER — TELEPHONE (OUTPATIENT)
Dept: INTERNAL MEDICINE CLINIC | Age: 84
End: 2019-11-21

## 2019-11-22 ENCOUNTER — TELEPHONE (OUTPATIENT)
Dept: INTERNAL MEDICINE CLINIC | Age: 84
End: 2019-11-22

## 2019-11-22 RX ORDER — METRONIDAZOLE 500 MG/1
500 TABLET ORAL 2 TIMES DAILY
Qty: 20 TABLET | Refills: 0 | Status: SHIPPED | OUTPATIENT
Start: 2019-11-22 | End: 2019-12-02

## 2019-11-26 ENCOUNTER — OFFICE VISIT (OUTPATIENT)
Dept: INTERNAL MEDICINE CLINIC | Age: 84
End: 2019-11-26
Payer: MEDICARE

## 2019-11-26 VITALS
SYSTOLIC BLOOD PRESSURE: 110 MMHG | RESPIRATION RATE: 15 BRPM | OXYGEN SATURATION: 99 % | DIASTOLIC BLOOD PRESSURE: 58 MMHG | HEART RATE: 59 BPM

## 2019-11-26 DIAGNOSIS — N18.2 CHRONIC RENAL IMPAIRMENT, STAGE 2 (MILD): ICD-10-CM

## 2019-11-26 DIAGNOSIS — I73.9 PERIPHERAL VASCULAR DISEASE (HCC): Primary | ICD-10-CM

## 2019-11-26 DIAGNOSIS — I10 ESSENTIAL HYPERTENSION: ICD-10-CM

## 2019-11-26 DIAGNOSIS — I63.20 CEREBROVASCULAR ACCIDENT (CVA) DUE TO STENOSIS OF PRECEREBRAL ARTERY (HCC): ICD-10-CM

## 2019-11-26 DIAGNOSIS — K21.00 GASTRO-ESOPHAGEAL REFLUX DISEASE WITH ESOPHAGITIS: ICD-10-CM

## 2019-11-26 DIAGNOSIS — I63.30 CEREBROVASCULAR ACCIDENT (CVA) DUE TO THROMBOSIS OF CEREBRAL ARTERY (HCC): ICD-10-CM

## 2019-11-26 DIAGNOSIS — I96 TOE NECROSIS (HCC): ICD-10-CM

## 2019-11-26 DIAGNOSIS — I63.30: ICD-10-CM

## 2019-11-26 LAB
INTERNATIONAL NORMALIZATION RATIO, POC: 3.6
PROTHROMBIN TIME, POC: 43.2

## 2019-11-26 PROCEDURE — 99495 TRANSJ CARE MGMT MOD F2F 14D: CPT | Performed by: INTERNAL MEDICINE

## 2019-11-26 PROCEDURE — 85610 PROTHROMBIN TIME: CPT | Performed by: INTERNAL MEDICINE

## 2019-11-26 PROCEDURE — 1111F DSCHRG MED/CURRENT MED MERGE: CPT | Performed by: INTERNAL MEDICINE

## 2019-11-26 RX ORDER — FAMOTIDINE 40 MG/1
40 TABLET, FILM COATED ORAL EVERY EVENING
Qty: 30 TABLET | Refills: 3
Start: 2019-11-26 | End: 2020-02-19 | Stop reason: SDUPTHER

## 2019-11-26 RX ORDER — WARFARIN SODIUM 2 MG/1
1 TABLET ORAL DAILY
Qty: 30 TABLET | Refills: 1
Start: 2019-11-26 | End: 2020-03-16

## 2019-12-02 ENCOUNTER — TELEPHONE (OUTPATIENT)
Dept: INTERNAL MEDICINE CLINIC | Age: 84
End: 2019-12-02

## 2019-12-03 ENCOUNTER — TELEPHONE (OUTPATIENT)
Dept: WOUND CARE | Age: 84
End: 2019-12-03

## 2019-12-05 DIAGNOSIS — R19.7 ACUTE DIARRHEA: Primary | ICD-10-CM

## 2019-12-05 RX ORDER — CHOLESTYRAMINE 4 G/9G
1 POWDER, FOR SUSPENSION ORAL 2 TIMES DAILY
Qty: 60 PACKET | Refills: 0 | Status: SHIPPED | OUTPATIENT
Start: 2019-12-05

## 2019-12-05 RX ORDER — VANCOMYCIN HYDROCHLORIDE 125 MG/1
125 CAPSULE ORAL 4 TIMES DAILY
Qty: 40 CAPSULE | Refills: 0 | Status: SHIPPED | OUTPATIENT
Start: 2019-12-05 | End: 2019-12-15

## 2019-12-09 ENCOUNTER — TELEPHONE (OUTPATIENT)
Dept: INTERNAL MEDICINE CLINIC | Age: 84
End: 2019-12-09

## 2019-12-12 ENCOUNTER — ANTI-COAG VISIT (OUTPATIENT)
Dept: INTERNAL MEDICINE CLINIC | Age: 84
End: 2019-12-12

## 2019-12-13 RX ORDER — HYDRALAZINE HYDROCHLORIDE 25 MG/1
25 TABLET, FILM COATED ORAL 3 TIMES DAILY
Qty: 90 TABLET | Refills: 0 | Status: SHIPPED | OUTPATIENT
Start: 2019-12-13 | End: 2019-12-20 | Stop reason: SDUPTHER

## 2019-12-20 ENCOUNTER — OFFICE VISIT (OUTPATIENT)
Dept: INTERNAL MEDICINE CLINIC | Age: 84
End: 2019-12-20
Payer: MEDICARE

## 2019-12-20 VITALS
SYSTOLIC BLOOD PRESSURE: 116 MMHG | RESPIRATION RATE: 15 BRPM | DIASTOLIC BLOOD PRESSURE: 64 MMHG | OXYGEN SATURATION: 100 % | HEART RATE: 67 BPM

## 2019-12-20 DIAGNOSIS — D62 ACUTE BLOOD LOSS ANEMIA: ICD-10-CM

## 2019-12-20 DIAGNOSIS — M15.9 OSTEOARTHRITIS, GENERALIZED: ICD-10-CM

## 2019-12-20 DIAGNOSIS — I63.50 CEREBROVASCULAR ACCIDENT (CVA) DUE TO STENOSIS OF CEREBRAL ARTERY (HCC): ICD-10-CM

## 2019-12-20 DIAGNOSIS — R42 VERTIGO: ICD-10-CM

## 2019-12-20 DIAGNOSIS — I73.9 CLAUDICATION IN PERIPHERAL VASCULAR DISEASE (HCC): ICD-10-CM

## 2019-12-20 DIAGNOSIS — I10 ESSENTIAL HYPERTENSION: Primary | ICD-10-CM

## 2019-12-20 LAB
INTERNATIONAL NORMALIZATION RATIO, POC: 1.3
PROTHROMBIN TIME, POC: 16

## 2019-12-20 PROCEDURE — G8427 DOCREV CUR MEDS BY ELIG CLIN: HCPCS | Performed by: INTERNAL MEDICINE

## 2019-12-20 PROCEDURE — G8420 CALC BMI NORM PARAMETERS: HCPCS | Performed by: INTERNAL MEDICINE

## 2019-12-20 PROCEDURE — 1090F PRES/ABSN URINE INCON ASSESS: CPT | Performed by: INTERNAL MEDICINE

## 2019-12-20 PROCEDURE — 1123F ACP DISCUSS/DSCN MKR DOCD: CPT | Performed by: INTERNAL MEDICINE

## 2019-12-20 PROCEDURE — G8482 FLU IMMUNIZE ORDER/ADMIN: HCPCS | Performed by: INTERNAL MEDICINE

## 2019-12-20 PROCEDURE — 85610 PROTHROMBIN TIME: CPT | Performed by: INTERNAL MEDICINE

## 2019-12-20 PROCEDURE — 4040F PNEUMOC VAC/ADMIN/RCVD: CPT | Performed by: INTERNAL MEDICINE

## 2019-12-20 PROCEDURE — 99213 OFFICE O/P EST LOW 20 MIN: CPT | Performed by: INTERNAL MEDICINE

## 2019-12-20 PROCEDURE — G8598 ASA/ANTIPLAT THER USED: HCPCS | Performed by: INTERNAL MEDICINE

## 2019-12-20 PROCEDURE — 1036F TOBACCO NON-USER: CPT | Performed by: INTERNAL MEDICINE

## 2019-12-20 RX ORDER — HYDRALAZINE HYDROCHLORIDE 25 MG/1
25 TABLET, FILM COATED ORAL 3 TIMES DAILY
Qty: 90 TABLET | Refills: 1 | Status: SHIPPED | OUTPATIENT
Start: 2019-12-20 | End: 2020-02-19 | Stop reason: SDUPTHER

## 2019-12-20 RX ORDER — HYDROCHLOROTHIAZIDE 12.5 MG/1
12.5 TABLET ORAL DAILY
Qty: 30 TABLET | Refills: 3 | Status: SHIPPED | OUTPATIENT
Start: 2019-12-20 | End: 2020-02-19 | Stop reason: SDUPTHER

## 2019-12-20 RX ORDER — CYCLOBENZAPRINE HCL 5 MG
5 TABLET ORAL EVERY EVENING
Qty: 90 TABLET | Refills: 1 | Status: SHIPPED | OUTPATIENT
Start: 2019-12-20

## 2019-12-20 RX ORDER — CLOPIDOGREL BISULFATE 75 MG/1
75 TABLET ORAL EVERY OTHER DAY
Qty: 45 TABLET | Refills: 1 | Status: SHIPPED | OUTPATIENT
Start: 2019-12-20

## 2019-12-20 RX ORDER — ATENOLOL 25 MG/1
25 TABLET ORAL DAILY
Qty: 90 TABLET | Refills: 1 | Status: SHIPPED | OUTPATIENT
Start: 2019-12-20

## 2019-12-20 RX ORDER — LANOLIN ALCOHOL/MO/W.PET/CERES
325 CREAM (GRAM) TOPICAL
Qty: 90 TABLET | Refills: 1 | Status: SHIPPED | OUTPATIENT
Start: 2019-12-20

## 2019-12-20 RX ORDER — MECLIZINE HYDROCHLORIDE CHEWABLE TABLETS 25 MG/1
25 TABLET, CHEWABLE ORAL 2 TIMES DAILY PRN
Qty: 180 TABLET | Refills: 1 | Status: SHIPPED | OUTPATIENT
Start: 2019-12-20

## 2019-12-20 RX ORDER — PREDNISONE 1 MG/1
5 TABLET ORAL DAILY
Qty: 90 TABLET | Refills: 1 | Status: SHIPPED | OUTPATIENT
Start: 2019-12-20 | End: 2020-04-18

## 2019-12-30 ENCOUNTER — TELEPHONE (OUTPATIENT)
Dept: INTERNAL MEDICINE CLINIC | Age: 84
End: 2019-12-30

## 2020-01-02 ENCOUNTER — OFFICE VISIT (OUTPATIENT)
Dept: SURGERY | Age: 85
End: 2020-01-02
Payer: MEDICARE

## 2020-01-02 VITALS — SYSTOLIC BLOOD PRESSURE: 110 MMHG | OXYGEN SATURATION: 96 % | HEART RATE: 64 BPM | DIASTOLIC BLOOD PRESSURE: 74 MMHG

## 2020-01-02 PROCEDURE — 1090F PRES/ABSN URINE INCON ASSESS: CPT | Performed by: SURGERY

## 2020-01-02 PROCEDURE — G8482 FLU IMMUNIZE ORDER/ADMIN: HCPCS | Performed by: SURGERY

## 2020-01-02 PROCEDURE — 4040F PNEUMOC VAC/ADMIN/RCVD: CPT | Performed by: SURGERY

## 2020-01-02 PROCEDURE — 1123F ACP DISCUSS/DSCN MKR DOCD: CPT | Performed by: SURGERY

## 2020-01-02 PROCEDURE — G8427 DOCREV CUR MEDS BY ELIG CLIN: HCPCS | Performed by: SURGERY

## 2020-01-02 PROCEDURE — 99204 OFFICE O/P NEW MOD 45 MIN: CPT | Performed by: SURGERY

## 2020-01-02 PROCEDURE — G8420 CALC BMI NORM PARAMETERS: HCPCS | Performed by: SURGERY

## 2020-01-02 PROCEDURE — 1036F TOBACCO NON-USER: CPT | Performed by: SURGERY

## 2020-01-02 ASSESSMENT — ENCOUNTER SYMPTOMS
EYE REDNESS: 0
ANAL BLEEDING: 0
BACK PAIN: 0
EYE ITCHING: 0
APNEA: 0
CONSTIPATION: 0
COLOR CHANGE: 0
CHOKING: 0
SORE THROAT: 0
PHOTOPHOBIA: 0
RECTAL PAIN: 0
STRIDOR: 0

## 2020-01-02 NOTE — PROGRESS NOTES
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 Pt is s/p angiogram and angioplasty three months ago. The gangrene evolved to this where the toes are about to fall off. Pt does pain sometimes at night. She has sx of claudication. I have reviewed the patient's(pertinent information to this visit) medical history, family history(scanned in  the [address] [place] [address] under \"patient questioner\"), social history and review of systems with the patient today in the office.             Past Surgical History:   Procedure Laterality Date    APPENDECTOMY      ARTERY SURGERY      STENT TO LEFT FEMORIAL AND ALSO TO RIGHT SUPRA FEMORIAL ARTERY    FRACTURE SURGERY      Left ???   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 - Dr Gilmore Innocent COPD (chronic obstructive pulmonary disease) ([address]     CVA (cerebral vascular accident) ([address]     L parietal w speech deficit  ~2000, sx resolved    Gastro-esophageal reflux disease with esophagitis     Gout     hand swelling and pain    Hyperlipidemia     Non-healing surgical wound     Osteoarthritis     Osteoarthritis, generalized     on chr pred started by Rheum    Peripheral vascular disease ([address] 1/2008, 11/20/2008    S/P PTA and stents X 2 to Right leg- Dr Audi Beebe Thoracic aortic aneurysm ([address]     DISCUSSED W PT;/FAMILY 2/2019- NO FURTHER INTERVENTION PLANNED AS POOR CANDIDATE    Venous stasis ulcer ([address] 11/19/2012 Family History   Problem Relation Age of Onset   [facility] Cancer Mother     Early Death Mother     Hearing Loss Mother     Cancer Sister      Social History     Socioeconomic History    Marital status:       Spouse name: Not on file    Number of children: Not on file    Years of education: Not on file    Highest education level: Not on file   Occupational History    Occupation: Retired   Social Needs    Financial resource strain: Not on file    Food insecurity:     Worry: Not on file     Inability: Not on file   [place] [facility] needs:     Medical: Not on file     Non-medical: Not on file   Tobacco Use    Smoking status: Former Smoker     Packs/day: 0.00     Last attempt to quit: 2000     Years since quittin.9    Smokeless tobacco: Former User    Tobacco comment: Prior Hx of 1 Pack per week, none currently   Substance and Sexual Activity    Alcohol use: Yes     Comment: pt states \"one drink a day\"    Drug use: No    Sexual activity: Never   Lifestyle    Physical activity:     Days per week: Not on file     Minutes per session: Not on file    Stress: Not on file   Relationships    Social connections:     Talks on phone: Not on file     Gets together: Not on file     Attends [employer] service: Not on file     Active member of club or organization: Not on file     Attends meetings of clubs or organizations: Not on file     Relationship status: Not on file    Intimate partner violence:     Fear of current or ex partner: Not on file     Emotionally abused: Not on file     Physically abused: Not on file     Forced sexual activity: Not on file   Other Topics Concern    Not on file   Social History Narrative    Not on file       Current Outpatient Medications   Medication Sig Dispense Refill    atenolol (TENORMIN) 25 MG tablet Take 1 tablet by mouth daily 90 tablet 1    clopidogrel (PLAVIX) 75 MG tablet Take 1 tablet by mouth every other day 45 tablet 1    predniSONE (DELTASONE) 5 MG tablet Take 1 tablet by mouth daily 90 tablet 1    ferrous sulfate 325 (65 Fe) MG EC tablet Take 1 tablet by mouth daily (with breakfast) 90 tablet 1    meclizine (TRAVEL SICKNESS) 25 MG CHEW Take 1 tablet by mouth 2 times daily as needed (dizziness/vertigo) 180 tablet 1    hydrALAZINE (APRESOLINE) 25 MG tablet Take 1 tablet by mouth 3 times daily 90 tablet 1    hydrochlorothiazide (HYDRODIURIL) 12.5 MG tablet Take 1 tablet by mouth daily 30 tablet 3    cyclobenzaprine (FLEXERIL) 5 MG tablet Take 1 tablet by mouth every evening 90 tablet 1    cholestyramine (QUESTRAN) 4 g packet Take 1 packet by mouth 2 times daily 60 packet 0    famotidine (PEPCID) 40 MG tablet Take 1 tablet by mouth every evening 30 tablet 3    warfarin (COUMADIN) 2 MG tablet Take 0.5 tablets by mouth daily Daily or as directed. 30 tablet 1    ammonium lactate (LAC-HYDRIN) 12 % lotion Apply 12 g topically as needed for Dry Skin Apply topically as needed.  HYDROcodone-acetaminophen (NORCO) 5-325 MG per tablet Take 1 tablet by mouth every 6 hours as needed for Pain.  Nutritional Supplements (ENSURE) LIQD Take 1 Can by mouth 2 times daily      acetaminophen (TYLENOL) 325 MG tablet Take 2 tablets by mouth every 4 hours as needed for Pain 120 tablet 3     No current facility-administered medications for this visit. Allergies   Allergen Reactions    Lisinopril      Cough      Losartan      cough    Sulfa Antibiotics     Pcn [Penicillins] Rash     Does tolerate keflex       Review of Systems:         Review of Systems   Constitutional: Negative for chills and fever. HENT: Negative for ear pain, mouth sores, sore throat and tinnitus. Eyes: Negative for photophobia, redness and itching. Respiratory: Negative for apnea, choking and stridor. Cardiovascular: Negative for chest pain and palpitations. Gastrointestinal: Negative for anal bleeding, constipation and rectal pain. Endocrine: Negative for polydipsia. Genitourinary: Negative for enuresis, flank pain and hematuria. Musculoskeletal: Positive for gait problem. Negative for back pain, joint swelling and myalgias. Skin: Negative for color change and pallor. Allergic/Immunologic: Negative for environmental allergies. Neurological: Negative for syncope and speech difficulty. Psychiatric/Behavioral: Negative for confusion and hallucinations. OBJECTIVE:  Physical Exam:    /74 (Site: Left Upper Arm, Position: Sitting, Cuff Size: Medium Adult)   Pulse 64   LMP  (LMP Unknown)   SpO2 96%      Physical Exam  Constitutional:       Appearance: She is well-developed. HENT:      Head: Normocephalic. Eyes:      Pupils: Pupils are equal, round, and reactive to light. Neck:      Musculoskeletal: Normal range of motion and neck supple. Cardiovascular:      Rate and Rhythm: Normal rate. Pulmonary:      Effort: Pulmonary effort is normal.   Abdominal:      General: There is no distension. Palpations: Abdomen is soft. There is no mass. Tenderness: There is no tenderness. There is no guarding or rebound. Musculoskeletal: Normal range of motion. Feet:    Skin:     General: Skin is warm. Neurological:      Mental Status: She is alert and oriented to person, place, and time. ASSESSMENT:  1. Critical lower limb ischemia          PLAN:  Treatment:  Will proceed with right first and second toe amputation under MAC. Patient counseled on risks, benefits, and alternatives of treatment plan at length today. Patient states an understanding and willingness to proceed with plan. No orders of the defined types were placed in this encounter. No orders of the defined types were placed in this encounter. Follow Up:  No follow-ups on file.       Anupama Ryan MD

## 2020-01-03 ENCOUNTER — TELEPHONE (OUTPATIENT)
Dept: INTERNAL MEDICINE CLINIC | Age: 85
End: 2020-01-03

## 2020-01-03 ENCOUNTER — ANESTHESIA EVENT (OUTPATIENT)
Dept: OPERATING ROOM | Age: 85
End: 2020-01-03
Payer: MEDICARE

## 2020-01-03 NOTE — ANESTHESIA PRE PROCEDURE
Historical Provider, MD   Nutritional Supplements (ENSURE) LIQD Take 1 Can by mouth 2 times daily    Historical Provider, MD   acetaminophen (TYLENOL) 325 MG tablet Take 2 tablets by mouth every 4 hours as needed for Pain 4/27/18   NAZANIN Campbell MD       Current medications:    Current Outpatient Medications   Medication Sig Dispense Refill    atenolol (TENORMIN) 25 MG tablet Take 1 tablet by mouth daily 90 tablet 1    clopidogrel (PLAVIX) 75 MG tablet Take 1 tablet by mouth every other day 45 tablet 1    predniSONE (DELTASONE) 5 MG tablet Take 1 tablet by mouth daily 90 tablet 1    ferrous sulfate 325 (65 Fe) MG EC tablet Take 1 tablet by mouth daily (with breakfast) 90 tablet 1    meclizine (TRAVEL SICKNESS) 25 MG CHEW Take 1 tablet by mouth 2 times daily as needed (dizziness/vertigo) 180 tablet 1    hydrALAZINE (APRESOLINE) 25 MG tablet Take 1 tablet by mouth 3 times daily 90 tablet 1    hydrochlorothiazide (HYDRODIURIL) 12.5 MG tablet Take 1 tablet by mouth daily 30 tablet 3    cyclobenzaprine (FLEXERIL) 5 MG tablet Take 1 tablet by mouth every evening 90 tablet 1    cholestyramine (QUESTRAN) 4 g packet Take 1 packet by mouth 2 times daily 60 packet 0    famotidine (PEPCID) 40 MG tablet Take 1 tablet by mouth every evening 30 tablet 3    warfarin (COUMADIN) 2 MG tablet Take 0.5 tablets by mouth daily Daily or as directed. 30 tablet 1    ammonium lactate (LAC-HYDRIN) 12 % lotion Apply 12 g topically as needed for Dry Skin Apply topically as needed.  HYDROcodone-acetaminophen (NORCO) 5-325 MG per tablet Take 1 tablet by mouth every 6 hours as needed for Pain.  Nutritional Supplements (ENSURE) LIQD Take 1 Can by mouth 2 times daily      acetaminophen (TYLENOL) 325 MG tablet Take 2 tablets by mouth every 4 hours as needed for Pain 120 tablet 3     No current facility-administered medications for this encounter. Allergies:     Allergies   Allergen Reactions    Lisinopril Cough      Losartan      cough    Sulfa Antibiotics     Pcn [Penicillins] Rash     Does tolerate keflex       Problem List:    Patient Active Problem List   Diagnosis Code    Hypertension I10    Hyperlipidemia E78.5    [facility]cere[facility] vascular [facility]) [facility] I63.9    Gastro-esophageal reflux disease with esophagitis K21.0    Basosquamous carcinoma C44.99    Osteoarthritis, generalized M15.9    Osteoarthritis of hand, left M19.042    COPD (chronic obstructive pulmonary disease) ([facility]) J44.9    Gout M10.9    Peripheral vascular disease ([facility]) I73.9    Atherosclerosis of native artery of both lower extremities with bilateral ulceration of calves ([facility]) I70.232, I70.242    Arterial insufficiency with ischemic ulcer ([facility]) I77.1, L98.499    Chronic renal impairment, stage 2 (mild) N18.2    Vasculitic ulcer of left lower extremity with fat layer exposed [facility] D16.501    AAA (abdominal aortic aneurysm) ([facility]) I71.4    Thoracic aortic aneurysm ([facility]) I71.2    Debility R53.81    Gait disturbance R26.9    Claudication in peripheral vascular disease ([facility]) I73.9    CAD in native artery I25.10    Anemia due to acute blood loss D62    Physical deconditioning R53.81    Cerebrovascular accident (CVA) due to thrombosis of cerebral artery ([facility]) I63.30    Acute cerebrovascular accident (CVA) ([facility] I63.9    Iron deficiency anemia D50.9    Stroke due to thrombosis of cerebral artery ([facility]) I63.30    CKD (chronic kidney disease) stage 3, GFR 30-59 ml/min ([facility]) N18.3    Nail avulsion of toe, left great toe S91.209A    Pain of left great toe M79.675    Coagulopathy ([facility]) D68.9    Acute blood loss anemia D62    WD-Skin tear of upper arm without complication, left, subsequent encounter S41.112D    WD-Noninfected skin tear of right leg, subsequent encounter S81.811D    WD-Skin ulcer of right foot with necrosis of muscle ([facility]) L97.513    Critical lower limb ischemia I99.8    Cellulitis of right foot L03.115    attending.                   [facility] [facility] HospitalLES - CNP   1/3/2020

## 2020-01-03 NOTE — TELEPHONE ENCOUNTER
Roselia Corporal nurse called to report pt has had diarrhea x 1/1/2020. Pt was given Rosa. Pt has had 7-8 liquid diarrhea episodes today, reporting dark in color/foul smelling/mucus. Please advise.

## 2020-01-07 ENCOUNTER — HOSPITAL ENCOUNTER (EMERGENCY)
Age: 85
Discharge: HOME | End: 2020-01-07
Attending: EMERGENCY MEDICINE
Payer: MEDICARE

## 2020-01-07 ENCOUNTER — APPOINTMENT (OUTPATIENT)
Dept: CT IMAGING | Age: 85
End: 2020-01-07
Payer: MEDICARE

## 2020-01-07 ENCOUNTER — TELEPHONE (OUTPATIENT)
Dept: SURGERY | Age: 85
End: 2020-01-07

## 2020-01-07 VITALS
RESPIRATION RATE: 16 BRPM | DIASTOLIC BLOOD PRESSURE: 54 MMHG | HEART RATE: 68 BPM | SYSTOLIC BLOOD PRESSURE: 136 MMHG | TEMPERATURE: 97.9 F | OXYGEN SATURATION: 97 %

## 2020-01-07 LAB
ALBUMIN SERPL-MCNC: 2.5 GM/DL (ref 3.4–5)
ALP BLD-CCNC: 93 IU/L (ref 40–128)
ALT SERPL-CCNC: 8 U/L (ref 10–40)
ANION GAP SERPL CALCULATED.3IONS-SCNC: 12 MMOL/L (ref 4–16)
ANISOCYTOSIS: ABNORMAL
AST SERPL-CCNC: 23 IU/L (ref 15–37)
BACTERIA: ABNORMAL /HPF
BANDED NEUTROPHILS ABSOLUTE COUNT: 3.36 K/CU MM
BANDED NEUTROPHILS RELATIVE PERCENT: 28 % (ref 5–11)
BILIRUB SERPL-MCNC: 0.3 MG/DL (ref 0–1)
BILIRUBIN URINE: NEGATIVE MG/DL
BLOOD, URINE: NEGATIVE
BUN BLDV-MCNC: 30 MG/DL (ref 6–23)
CALCIUM SERPL-MCNC: 8.3 MG/DL (ref 8.3–10.6)
CHLORIDE BLD-SCNC: 98 MMOL/L (ref 99–110)
CLARITY: CLEAR
CO2: 23 MMOL/L (ref 21–32)
COLOR: YELLOW
CREAT SERPL-MCNC: 1.5 MG/DL (ref 0.6–1.1)
DIFFERENTIAL TYPE: ABNORMAL
GFR AFRICAN AMERICAN: 39 ML/MIN/1.73M2
GFR NON-AFRICAN AMERICAN: 32 ML/MIN/1.73M2
GLUCOSE BLD-MCNC: 177 MG/DL (ref 70–99)
GLUCOSE, URINE: NEGATIVE MG/DL
HCT VFR BLD CALC: 37.5 % (ref 37–47)
HEMOGLOBIN: 11.3 GM/DL (ref 12.5–16)
HYALINE CASTS: 2 /LPF
KETONES, URINE: NEGATIVE MG/DL
LACTATE: ABNORMAL MMOL/L (ref 0.4–2)
LEUKOCYTE ESTERASE, URINE: ABNORMAL
LIPASE: 8 IU/L (ref 13–60)
LYMPHOCYTES ABSOLUTE: 1.1 K/CU MM
LYMPHOCYTES RELATIVE PERCENT: 9 % (ref 24–44)
MCH RBC QN AUTO: 24.7 PG (ref 27–31)
MCHC RBC AUTO-ENTMCNC: 30.1 % (ref 32–36)
MCV RBC AUTO: 82.1 FL (ref 78–100)
MONOCYTES ABSOLUTE: 0.5 K/CU MM
MONOCYTES RELATIVE PERCENT: 4 % (ref 0–4)
MUCUS: ABNORMAL HPF
NITRITE URINE, QUANTITATIVE: NEGATIVE
OVALOCYTES: ABNORMAL
PDW BLD-RTO: 16.9 % (ref 11.7–14.9)
PH, URINE: 5 (ref 5–8)
PLATELET # BLD: 436 K/CU MM (ref 140–440)
PLT MORPHOLOGY: ABNORMAL
PMV BLD AUTO: 10.5 FL (ref 7.5–11.1)
POLYCHROMASIA: ABNORMAL
POTASSIUM SERPL-SCNC: 3.9 MMOL/L (ref 3.5–5.1)
PROTEIN UA: NEGATIVE MG/DL
RBC # BLD: 4.57 M/CU MM (ref 4.2–5.4)
RBC # BLD: ABNORMAL 10*6/UL
RBC URINE: ABNORMAL /HPF (ref 0–6)
SEGMENTED NEUTROPHILS ABSOLUTE COUNT: 7 K/CU MM
SEGMENTED NEUTROPHILS RELATIVE PERCENT: 59 % (ref 36–66)
SODIUM BLD-SCNC: 133 MMOL/L (ref 135–145)
SPECIFIC GRAVITY UA: 1.01 (ref 1–1.03)
TOTAL PROTEIN: 5.4 GM/DL (ref 6.4–8.2)
TOXIC GRANULATION: PRESENT
TRANSITIONAL EPITHELIAL: <1 /HPF
TRICHOMONAS: ABNORMAL /HPF
UROBILINOGEN, URINE: NORMAL MG/DL (ref 0.2–1)
WBC # BLD: 12 K/CU MM (ref 4–10.5)
WBC UA: 6 /HPF (ref 0–5)
YEAST: ABNORMAL /HPF

## 2020-01-07 PROCEDURE — 83690 ASSAY OF LIPASE: CPT

## 2020-01-07 PROCEDURE — 74176 CT ABD & PELVIS W/O CONTRAST: CPT

## 2020-01-07 PROCEDURE — 96361 HYDRATE IV INFUSION ADD-ON: CPT

## 2020-01-07 PROCEDURE — 96375 TX/PRO/DX INJ NEW DRUG ADDON: CPT

## 2020-01-07 PROCEDURE — 85027 COMPLETE CBC AUTOMATED: CPT

## 2020-01-07 PROCEDURE — 2580000003 HC RX 258: Performed by: EMERGENCY MEDICINE

## 2020-01-07 PROCEDURE — 80053 COMPREHEN METABOLIC PANEL: CPT

## 2020-01-07 PROCEDURE — 81001 URINALYSIS AUTO W/SCOPE: CPT

## 2020-01-07 PROCEDURE — 6370000000 HC RX 637 (ALT 250 FOR IP): Performed by: EMERGENCY MEDICINE

## 2020-01-07 PROCEDURE — 2500000003 HC RX 250 WO HCPCS: Performed by: EMERGENCY MEDICINE

## 2020-01-07 PROCEDURE — 6360000002 HC RX W HCPCS: Performed by: EMERGENCY MEDICINE

## 2020-01-07 PROCEDURE — 85007 BL SMEAR W/DIFF WBC COUNT: CPT

## 2020-01-07 PROCEDURE — 83605 ASSAY OF LACTIC ACID: CPT

## 2020-01-07 PROCEDURE — 99284 EMERGENCY DEPT VISIT MOD MDM: CPT

## 2020-01-07 PROCEDURE — 96365 THER/PROPH/DIAG IV INF INIT: CPT

## 2020-01-07 RX ORDER — OXYCODONE HYDROCHLORIDE AND ACETAMINOPHEN 5; 325 MG/1; MG/1
1 TABLET ORAL ONCE
Status: COMPLETED | OUTPATIENT
Start: 2020-01-07 | End: 2020-01-07

## 2020-01-07 RX ORDER — ONDANSETRON 4 MG/1
4 TABLET, ORALLY DISINTEGRATING ORAL EVERY 8 HOURS PRN
Qty: 15 TABLET | Refills: 0 | Status: SHIPPED | OUTPATIENT
Start: 2020-01-07 | End: 2020-01-14 | Stop reason: ALTCHOICE

## 2020-01-07 RX ORDER — 0.9 % SODIUM CHLORIDE 0.9 %
1000 INTRAVENOUS SOLUTION INTRAVENOUS ONCE
Status: COMPLETED | OUTPATIENT
Start: 2020-01-07 | End: 2020-01-07

## 2020-01-07 RX ORDER — FAMOTIDINE 20 MG/1
20 TABLET, FILM COATED ORAL 2 TIMES DAILY
Qty: 14 TABLET | Refills: 0 | Status: SHIPPED | OUTPATIENT
Start: 2020-01-07 | End: 2020-01-14 | Stop reason: ALTCHOICE

## 2020-01-07 RX ORDER — DICYCLOMINE HYDROCHLORIDE 10 MG/1
10 CAPSULE ORAL 3 TIMES DAILY
Qty: 15 CAPSULE | Refills: 3 | Status: SHIPPED | OUTPATIENT
Start: 2020-01-07 | End: 2020-01-14 | Stop reason: ALTCHOICE

## 2020-01-07 RX ORDER — ONDANSETRON 2 MG/ML
4 INJECTION INTRAMUSCULAR; INTRAVENOUS EVERY 30 MIN PRN
Status: DISCONTINUED | OUTPATIENT
Start: 2020-01-07 | End: 2020-01-07 | Stop reason: HOSPADM

## 2020-01-07 RX ORDER — METRONIDAZOLE 500 MG/1
500 TABLET ORAL 3 TIMES DAILY
Qty: 30 TABLET | Refills: 0 | Status: SHIPPED | OUTPATIENT
Start: 2020-01-07 | End: 2020-01-08 | Stop reason: ALTCHOICE

## 2020-01-07 RX ADMIN — OXYCODONE HYDROCHLORIDE AND ACETAMINOPHEN 1 TABLET: 5; 325 TABLET ORAL at 14:59

## 2020-01-07 RX ADMIN — SODIUM CHLORIDE 1000 ML: 9 INJECTION, SOLUTION INTRAVENOUS at 14:04

## 2020-01-07 RX ADMIN — SODIUM CHLORIDE 1000 ML: 9 INJECTION, SOLUTION INTRAVENOUS at 14:05

## 2020-01-07 RX ADMIN — METRONIDAZOLE 500 MG: 500 INJECTION, SOLUTION INTRAVENOUS at 17:10

## 2020-01-07 RX ADMIN — ONDANSETRON 4 MG: 2 INJECTION INTRAMUSCULAR; INTRAVENOUS at 14:08

## 2020-01-07 ASSESSMENT — ENCOUNTER SYMPTOMS
ALLERGIC/IMMUNOLOGIC NEGATIVE: 1
EYES NEGATIVE: 1
DIARRHEA: 1
RESPIRATORY NEGATIVE: 1
NAUSEA: 1

## 2020-01-07 ASSESSMENT — PAIN SCALES - GENERAL: PAINLEVEL_OUTOF10: 8

## 2020-01-07 NOTE — ED NOTES
Bed: ED-32  Expected date:   Expected time:   Means of arrival:   Comments:  Medic 8     Rufina Hernandez  01/07/20 [id number]

## 2020-01-07 NOTE — ED TRIAGE NOTES
Pt presents to ED from home for c/o diarrhea ongoing for one week and lack of appetite. Pt recently dx with C-Diff and started on vancomycin. Pt denies pain or further complaints.

## 2020-01-07 NOTE — ED NOTES
Krishna Phillips RN from hospice notified me that pt is to return home and they will continue her care there. Family is on board with this decision.      Antoinette Akbar RN  01/07/20 [id number]

## 2020-01-07 NOTE — ED PROVIDER NOTES
clubs or organizations: Not on file     Relationship status: Not on file    Intimate partner violence:     Fear of current or ex partner: Not on file     Emotionally abused: Not on file     Physically abused: Not on file     Forced sexual activity: Not on file   Other Topics Concern    Not on file   Social History Narrative    Not on file     Current Facility-Administered Medications   Medication Dose Route Frequency Provider Last Rate Last Dose    ondansetron (ZOFRAN) injection 4 mg  4 mg Intravenous Q30 Min PRN Shirlie Railing, DO   4 mg at 01/07/20 1408    vancomycin (VANCOCIN) oral solution 250 mg  250 mg Oral Once Shirlie Railing, DO        metronidazole (FLAGYL) 500 mg in NaCl 100 mL IVPB premix  500 mg Intravenous Once Shirlie Railing,  mL/hr at 01/07/20 1710 500 mg at 01/07/20 1710     Current Outpatient Medications   Medication Sig Dispense Refill    famotidine (PEPCID) 20 MG tablet Take 1 tablet by mouth 2 times daily 14 tablet 0    dicyclomine (BENTYL) 10 MG capsule Take 1 capsule by mouth 3 times daily As needed for abdominal pain 15 capsule 3    ondansetron (ZOFRAN ODT) 4 MG disintegrating tablet Take 1 tablet by mouth every 8 hours as needed for Nausea 15 tablet 0    metroNIDAZOLE (FLAGYL) 500 MG tablet Take 1 tablet by mouth 3 times daily for 10 days 30 tablet 0    atenolol (TENORMIN) 25 MG tablet Take 1 tablet by mouth daily 90 tablet 1    clopidogrel (PLAVIX) 75 MG tablet Take 1 tablet by mouth every other day 45 tablet 1    predniSONE (DELTASONE) 5 MG tablet Take 1 tablet by mouth daily 90 tablet 1    ferrous sulfate 325 (65 Fe) MG EC tablet Take 1 tablet by mouth daily (with breakfast) 90 tablet 1    meclizine (TRAVEL SICKNESS) 25 MG CHEW Take 1 tablet by mouth 2 times daily as needed (dizziness/vertigo) 180 tablet 1    hydrALAZINE (APRESOLINE) 25 MG tablet Take 1 tablet by mouth 3 times daily 90 tablet 1    hydrochlorothiazide (HYDRODIURIL) 12.5 MG tablet Take 1 tablet by mouth daily 30 tablet 3    cyclobenzaprine (FLEXERIL) 5 MG tablet Take 1 tablet by mouth every evening 90 tablet 1    cholestyramine (QUESTRAN) 4 g packet Take 1 packet by mouth 2 times daily 60 packet 0    famotidine (PEPCID) 40 MG tablet Take 1 tablet by mouth every evening 30 tablet 3    warfarin (COUMADIN) 2 MG tablet Take 0.5 tablets by mouth daily Daily or as directed. 30 tablet 1    ammonium lactate (LAC-HYDRIN) 12 % lotion Apply 12 g topically as needed for Dry Skin Apply topically as needed.  HYDROcodone-acetaminophen (NORCO) 5-325 MG per tablet Take 1 tablet by mouth every 6 hours as needed for Pain.       Nutritional Supplements (ENSURE) LIQD Take 1 Can by mouth 2 times daily      acetaminophen (TYLENOL) 325 MG tablet Take 2 tablets by mouth every 4 hours as needed for Pain 120 tablet 3      Allergies   Allergen Reactions    Lisinopril      Cough      Losartan      cough    Sulfa Antibiotics     Pcn [Penicillins] Rash     Does tolerate keflex     Current Facility-Administered Medications   Medication Dose Route Frequency Provider Last Rate Last Dose    ondansetron (ZOFRAN) injection 4 mg  4 mg Intravenous Q30 Min PRN [facility], [facility]   4 mg at 01/07/20 1408    vancomycin (VANCOCIN) oral solution 250 mg  250 mg Oral Once Koleen Lakes, DO        metronidazole (FLAGYL) 500 mg in NaCl 100 mL IVPB premix  500 mg Intravenous Once Koleen Lakes,  mL/hr at 01/07/20 1710 500 mg at 01/07/20 1710     Current Outpatient Medications   Medication Sig Dispense Refill    famotidine (PEPCID) 20 MG tablet Take 1 tablet by mouth 2 times daily 14 tablet 0    dicyclomine (BENTYL) 10 MG capsule Take 1 capsule by mouth 3 times daily As needed for abdominal pain 15 capsule 3    ondansetron (ZOFRAN ODT) 4 MG disintegrating tablet Take 1 tablet by mouth every 8 hours as needed for Nausea 15 tablet 0    metroNIDAZOLE (FLAGYL) 500 MG tablet Take 1 tablet by mouth 3 times daily for 10 days 30 tablet 0    atenolol (TENORMIN) 25 MG tablet Take 1 tablet by mouth daily 90 tablet 1    clopidogrel (PLAVIX) 75 MG tablet Take 1 tablet by mouth every other day 45 tablet 1    predniSONE (DELTASONE) 5 MG tablet Take 1 tablet by mouth daily 90 tablet 1    ferrous sulfate 325 (65 Fe) MG EC tablet Take 1 tablet by mouth daily (with breakfast) 90 tablet 1    meclizine (TRAVEL SICKNESS) 25 MG CHEW Take 1 tablet by mouth 2 times daily as needed (dizziness/vertigo) 180 tablet 1    hydrALAZINE (APRESOLINE) 25 MG tablet Take 1 tablet by mouth 3 times daily 90 tablet 1    hydrochlorothiazide (HYDRODIURIL) 12.5 MG tablet Take 1 tablet by mouth daily 30 tablet 3    cyclobenzaprine (FLEXERIL) 5 MG tablet Take 1 tablet by mouth every evening 90 tablet 1    cholestyramine (QUESTRAN) 4 g packet Take 1 packet by mouth 2 times daily 60 packet 0    famotidine (PEPCID) 40 MG tablet Take 1 tablet by mouth every evening 30 tablet 3    warfarin (COUMADIN) 2 MG tablet Take 0.5 tablets by mouth daily Daily or as directed. 30 tablet 1    ammonium lactate (LAC-HYDRIN) 12 % lotion Apply 12 g topically as needed for Dry Skin Apply topically as needed.  HYDROcodone-acetaminophen (NORCO) 5-325 MG per tablet Take 1 tablet by mouth every 6 hours as needed for Pain.  Nutritional Supplements (ENSURE) LIQD Take 1 Can by mouth 2 times daily      acetaminophen (TYLENOL) 325 MG tablet Take 2 tablets by mouth every 4 hours as needed for Pain 120 tablet 3       Nursing Notes Reviewed    VITAL SIGNS:  ED Triage Vitals [01/07/20 1326]   Enc Vitals Group      BP (!) 139/59      Pulse 66      Resp 16      Temp 97.8 °F (36.6 °C)      Temp Source Oral      SpO2 97 %      Weight       Height       Head Circumference       Peak Flow       Pain Score       Pain Loc       Pain Edu? Excl. in [address]? PHYSICAL EXAM:  Physical Exam  Vitals signs and nursing note reviewed. Constitutional:       General: She is not in acute distress. Mood and Affect: Mood normal.         Behavior: Behavior normal. Behavior is cooperative. Thought Content:  Thought content normal.         Judgment: Judgment normal.           I have reviewed andinterpreted all of the currently available lab results from this visit (if applicable):    Results for orders placed or performed during the hospital encounter of 01/07/20   CBC Auto Differential   Result Value Ref Range    WBC 12.0 (H) 4.0 - 10.5 K/CU MM    RBC 4.57 4.2 - 5.4 M/CU MM    Hemoglobin 11.3 (L) 12.5 - 16.0 GM/DL    Hematocrit 37.5 37 - 47 %    MCV 82.1 78 - 100 FL    MCH 24.7 (L) 27 - 31 PG    MCHC 30.1 (L) 32.0 - 36.0 %    RDW 16.9 (H) 11.7 - 14.9 %    Platelets 059 930 - 920 K/CU MM    MPV 10.5 7.5 - 11.1 FL    Bands Relative 28 (H) 5 - 11 %    Segs Relative 59.0 36 - 66 %    Lymphocytes % 9.0 (L) 24 - 44 %    Monocytes % 4.0 0 - 4 %    Bands Absolute 3.36 K/CU MM    Segs Absolute 7.0 K/CU MM    Lymphocytes Absolute 1.1 K/CU MM    Monocytes Absolute 0.5 K/CU MM    RBC Morphology FEW  ACANTHOCYTES       Differential Type MANUAL DIFFERENTIAL     Anisocytosis 1+     Polychromasia 1+     Ovalocytes 1+     Toxic Granulation PRESENT     PLT Morphology FEW  LARGE  PLT NOTED ON SCAN      CMP   Result Value Ref Range    Sodium 133 (L) 135 - 145 MMOL/L    Potassium 3.9 3.5 - 5.1 MMOL/L    Chloride 98 (L) 99 - 110 mMol/L    CO2 23 21 - 32 MMOL/L    BUN 30 (H) 6 - 23 MG/DL    CREATININE 1.5 (H) 0.6 - 1.1 MG/DL    Glucose 177 (H) 70 - 99 MG/DL    Calcium 8.3 8.3 - 10.6 MG/DL    Alb 2.5 (L) 3.4 - 5.0 GM/DL    Total Protein 5.4 (L) 6.4 - 8.2 GM/DL    Total Bilirubin 0.3 0.0 - 1.0 MG/DL    ALT 8 (L) 10 - 40 U/L    AST 23 15 - 37 IU/L    Alkaline Phosphatase 93 40 - 128 IU/L    GFR Non- 32 (L) >60 mL/min/1.73m2    GFR  39 (L) >60 mL/min/1.73m2    Anion Gap 12 4 - 16   Lipase   Result Value Ref Range    Lipase 8 (L) 13 - 60 IU/L   Lactic Acid, Plasma   Result Value Ref Range    Lactate (HH) 0.4 - 2.0 mMOL/L     2.6  LACT CALLED TO BATOOL CURRIE AT [phone] BY  MLT     Urinalysis   Result Value Ref Range    Color, UA YELLOW YELLOW    Clarity, UA CLEAR CLEAR    Glucose, Urine NEGATIVE NEGATIVE MG/DL    Bilirubin Urine NEGATIVE NEGATIVE MG/DL    Ketones, Urine NEGATIVE NEGATIVE MG/DL    Specific Gravity, UA 1.015 1.001 - 1.035    Blood, Urine NEGATIVE NEGATIVE    pH, Urine 5.0 5.0 - 8.0    Protein, UA NEGATIVE NEGATIVE MG/DL    Urobilinogen, Urine NORMAL 0.2 - 1.0 MG/DL    Nitrite Urine, Quantitative NEGATIVE NEGATIVE    Leukocyte Esterase, Urine TRACE (A) NEGATIVE    RBC, UA NONE SEEN 0 - 6 /HPF    WBC, UA 6 (H) 0 - 5 /HPF    Bacteria, UA RARE (A) NEGATIVE /HPF    Yeast, UA FEW /HPF    Trans Epithel, UA <1 /HPF    Mucus, UA RARE (A) NEGATIVE HPF    Trichomonas, UA NONE SEEN NONE SEEN /HPF    Hyaline Casts, UA 2 /LPF        Radiographs (if obtained):  [] The following radiograph was interpreted by myself in the absence of a radiologist:  [x] Radiologist's Report Reviewed:    CT Abd/Pelv    EKG (if obtained): (All EKG's are interpreted by myself in the absence of a cardiologist)    Total critical care time today provided was at least 32 minutes. This excludes seperately billable procedure. Critical care time provided for reviewing labs, images, giving antibiotics, fluids, consulting hospice that required close evaluation and/or intervention with concern for patient decompensation. MDM:    Here with nausea abdominal pain diarrhea diagnosed with C. difficile recently by hospice nurse. She is on hospice DNR. She is not been eating or drinking well. She started antibiotics we will give her hydration, check lab work, imaging otherwise patient stable. Consult hospice. Hospice nurse saw patient in ER okay sending patient home after getting medications here. Patient given IV fluids vancomycin orally Flagyl IV. She has on vancomycin orally at home has colitis.   On CT scan also has aneurysm. 80years old nonoperative candidate. Patient is on hospice DNR. We will send her home with Flagyl started has been Comycin will give some stomach medications nausea medicines return precautions and follow-up information otherwise stable. She appears nontoxic nonseptic she has no complaints otherwise. Vital signs stable.     CLINICAL IMPRESSION:  Final diagnoses:   Diarrhea, unspecified type   C. difficile diarrhea   Colitis   Abdominal aneurysm ([address]       (Please note that portions of this note may have been completed with a voice recognition program. Efforts were made to edit the dictations but occasionally words aremis-transcribed.)    DISPOSITION REFERRAL (if applicable):  Mere Lovell MD  [address] [place] [address]  [phone]    In 1 day      [facility] Emergency Department  [facility] [id number] [phone]    If symptoms worsen      DISPOSITION MEDICATIONS (if applicable):  New Prescriptions    DICYCLOMINE (BENTYL) 10 MG CAPSULE    Take 1 capsule by mouth 3 times daily As needed for abdominal pain    FAMOTIDINE (PEPCID) 20 MG TABLET    Take 1 tablet by mouth 2 times daily    METRONIDAZOLE (FLAGYL) 500 MG TABLET    Take 1 tablet by mouth 3 times daily for 10 days    ONDANSETRON (ZOFRAN ODT) 4 MG DISINTEGRATING TABLET    Take 1 tablet by mouth every 8 hours as needed for Nausea          Isaura Valenzuela, DO           Isaura Valenzuela,   01/07/20 7103

## 2020-01-08 ENCOUNTER — TELEPHONE (OUTPATIENT)
Dept: INTERNAL MEDICINE CLINIC | Age: 85
End: 2020-01-08

## 2020-01-08 NOTE — TELEPHONE ENCOUNTER
[facility] notified and [facility] notified.

## 2020-01-08 NOTE — TELEPHONE ENCOUNTER
Maddi Salas called to report Iesha's INR is 5.2, she is taking 1 mg coumadin. She has also been sick and on abx. Griffin Conn called stating that Nancy Ramirez was seen yesterday in the ED for cdiff and they prescribed her Flagyl. She started on vancomycin on Monday from Dr. Mirta Saldivar. Should she take both abx? Or what do you advise?

## 2020-01-13 ENCOUNTER — TELEPHONE (OUTPATIENT)
Dept: INTERNAL MEDICINE CLINIC | Age: 85
End: 2020-01-13

## 2020-01-13 NOTE — TELEPHONE ENCOUNTER
Arielle with Hospice calls- INR today 2.4. Coumadin 1mg every day. Starting tonight patient will be holding Coumadin. She is scheduled next week to have her right toe amputated. Please advise.

## 2020-01-13 NOTE — TELEPHONE ENCOUNTER
Cont same dose till holding for amputation. Then resume coumadin postop when cleared by Dr Barbaraann Homans or two days after surgery if no instructions given.

## 2020-01-14 ENCOUNTER — HOSPITAL ENCOUNTER (OUTPATIENT)
Dept: PREADMISSION TESTING | Age: 85
Discharge: HOME | End: 2020-01-18
Payer: MEDICARE

## 2020-01-14 VITALS
DIASTOLIC BLOOD PRESSURE: 71 MMHG | HEIGHT: 66 IN | TEMPERATURE: 97.2 F | BODY MASS INDEX: 19.29 KG/M2 | RESPIRATION RATE: 16 BRPM | SYSTOLIC BLOOD PRESSURE: 132 MMHG | OXYGEN SATURATION: 100 % | WEIGHT: 120 LBS | HEART RATE: 70 BPM

## 2020-01-14 LAB
ANION GAP SERPL CALCULATED.3IONS-SCNC: 13 MMOL/L (ref 4–16)
BUN BLDV-MCNC: 12 MG/DL (ref 6–23)
CALCIUM SERPL-MCNC: 8.9 MG/DL (ref 8.3–10.6)
CHLORIDE BLD-SCNC: 98 MMOL/L (ref 99–110)
CO2: 25 MMOL/L (ref 21–32)
CREAT SERPL-MCNC: 1 MG/DL (ref 0.6–1.1)
EKG ATRIAL RATE: 73 BPM
EKG DIAGNOSIS: NORMAL
EKG P AXIS: 43 DEGREES
EKG P-R INTERVAL: 164 MS
EKG Q-T INTERVAL: 424 MS
EKG QRS DURATION: 86 MS
EKG QTC CALCULATION (BAZETT): 467 MS
EKG R AXIS: 8 DEGREES
EKG T AXIS: 68 DEGREES
EKG VENTRICULAR RATE: 73 BPM
GFR AFRICAN AMERICAN: >60 ML/MIN/1.73M2
GFR NON-AFRICAN AMERICAN: 51 ML/MIN/1.73M2
GLUCOSE BLD-MCNC: 175 MG/DL (ref 70–99)
HCT VFR BLD CALC: 37.4 % (ref 37–47)
HEMOGLOBIN: 11.1 GM/DL (ref 12.5–16)
MCH RBC QN AUTO: 24.7 PG (ref 27–31)
MCHC RBC AUTO-ENTMCNC: 29.7 % (ref 32–36)
MCV RBC AUTO: 83.3 FL (ref 78–100)
PDW BLD-RTO: 17.5 % (ref 11.7–14.9)
PLATELET # BLD: 360 K/CU MM (ref 140–440)
PMV BLD AUTO: 9.9 FL (ref 7.5–11.1)
POTASSIUM SERPL-SCNC: 4.4 MMOL/L (ref 3.5–5.1)
RBC # BLD: 4.49 M/CU MM (ref 4.2–5.4)
SODIUM BLD-SCNC: 136 MMOL/L (ref 135–145)
WBC # BLD: 13.5 K/CU MM (ref 4–10.5)

## 2020-01-14 PROCEDURE — 36415 COLL VENOUS BLD VENIPUNCTURE: CPT

## 2020-01-14 PROCEDURE — 93010 ELECTROCARDIOGRAM REPORT: CPT | Performed by: INTERNAL MEDICINE

## 2020-01-14 PROCEDURE — 93005 ELECTROCARDIOGRAM TRACING: CPT | Performed by: NURSE PRACTITIONER

## 2020-01-14 PROCEDURE — 85027 COMPLETE CBC AUTOMATED: CPT

## 2020-01-14 PROCEDURE — 80048 BASIC METABOLIC PNL TOTAL CA: CPT

## 2020-01-14 RX ORDER — RANITIDINE 150 MG/1
150 TABLET ORAL 2 TIMES DAILY
COMMUNITY
End: 2020-02-19 | Stop reason: ALTCHOICE

## 2020-01-14 RX ORDER — LOVASTATIN 40 MG/1
40 TABLET ORAL 2 TIMES DAILY
COMMUNITY
End: 2020-01-31 | Stop reason: SDUPTHER

## 2020-01-14 RX ORDER — OXYCODONE HYDROCHLORIDE AND ACETAMINOPHEN 5; 325 MG/1; MG/1
1 TABLET ORAL EVERY 6 HOURS PRN
COMMUNITY
End: 2020-02-06 | Stop reason: ALTCHOICE

## 2020-01-14 RX ORDER — VANCOMYCIN HYDROCHLORIDE 125 MG/1
125 CAPSULE ORAL 4 TIMES DAILY
COMMUNITY

## 2020-01-17 ENCOUNTER — OFFICE VISIT (OUTPATIENT)
Dept: INTERNAL MEDICINE CLINIC | Age: 85
End: 2020-01-17
Payer: MEDICARE

## 2020-01-17 VITALS
HEART RATE: 65 BPM | OXYGEN SATURATION: 100 % | RESPIRATION RATE: 16 BRPM | DIASTOLIC BLOOD PRESSURE: 78 MMHG | SYSTOLIC BLOOD PRESSURE: 119 MMHG

## 2020-01-17 PROBLEM — D62 ANEMIA DUE TO ACUTE BLOOD LOSS: Status: RESOLVED | Noted: 2018-07-04 | Resolved: 2020-01-17

## 2020-01-17 PROBLEM — S91.209A NAIL AVULSION OF TOE: Status: RESOLVED | Noted: 2019-08-08 | Resolved: 2020-01-17

## 2020-01-17 PROBLEM — L97.513 SKIN ULCER OF RIGHT FOOT WITH NECROSIS OF MUSCLE (HCC): Status: RESOLVED | Noted: 2019-10-31 | Resolved: 2020-01-17

## 2020-01-17 PROBLEM — L97.519: Status: RESOLVED | Noted: 2019-11-14 | Resolved: 2020-01-17

## 2020-01-17 PROBLEM — I63.9 ACUTE CEREBROVASCULAR ACCIDENT (CVA) (HCC): Status: RESOLVED | Noted: 2018-09-18 | Resolved: 2020-01-17

## 2020-01-17 PROBLEM — S81.811D: Status: RESOLVED | Noted: 2019-10-17 | Resolved: 2020-01-17

## 2020-01-17 PROBLEM — S41.112D SKIN TEAR OF UPPER ARM WITHOUT COMPLICATION, LEFT, SUBSEQUENT ENCOUNTER: Status: RESOLVED | Noted: 2019-10-17 | Resolved: 2020-01-17

## 2020-01-17 PROCEDURE — G8420 CALC BMI NORM PARAMETERS: HCPCS | Performed by: INTERNAL MEDICINE

## 2020-01-17 PROCEDURE — G8926 SPIRO NO PERF OR DOC: HCPCS | Performed by: INTERNAL MEDICINE

## 2020-01-17 PROCEDURE — 1123F ACP DISCUSS/DSCN MKR DOCD: CPT | Performed by: INTERNAL MEDICINE

## 2020-01-17 PROCEDURE — 1036F TOBACCO NON-USER: CPT | Performed by: INTERNAL MEDICINE

## 2020-01-17 PROCEDURE — 3023F SPIROM DOC REV: CPT | Performed by: INTERNAL MEDICINE

## 2020-01-17 PROCEDURE — 4040F PNEUMOC VAC/ADMIN/RCVD: CPT | Performed by: INTERNAL MEDICINE

## 2020-01-17 PROCEDURE — 1090F PRES/ABSN URINE INCON ASSESS: CPT | Performed by: INTERNAL MEDICINE

## 2020-01-17 PROCEDURE — 99214 OFFICE O/P EST MOD 30 MIN: CPT | Performed by: INTERNAL MEDICINE

## 2020-01-17 PROCEDURE — G8482 FLU IMMUNIZE ORDER/ADMIN: HCPCS | Performed by: INTERNAL MEDICINE

## 2020-01-17 PROCEDURE — G8427 DOCREV CUR MEDS BY ELIG CLIN: HCPCS | Performed by: INTERNAL MEDICINE

## 2020-01-17 ASSESSMENT — PATIENT HEALTH QUESTIONNAIRE - PHQ9
DEPRESSION UNABLE TO ASSESS: FUNCTIONAL CAPACITY MOTIVATION LIMITS ACCURACY
1. LITTLE INTEREST OR PLEASURE IN DOING THINGS: 0
SUM OF ALL RESPONSES TO PHQ QUESTIONS 1-9: 0
2. FEELING DOWN, DEPRESSED OR HOPELESS: 0
SUM OF ALL RESPONSES TO PHQ QUESTIONS 1-9: 0
SUM OF ALL RESPONSES TO PHQ9 QUESTIONS 1 & 2: 0

## 2020-01-20 ENCOUNTER — ANTI-COAG VISIT (OUTPATIENT)
Dept: INTERNAL MEDICINE CLINIC | Age: 85
End: 2020-01-20

## 2020-01-21 ENCOUNTER — HOSPITAL ENCOUNTER (OUTPATIENT)
Age: 85
Setting detail: OUTPATIENT SURGERY
Discharge: HOME | End: 2020-01-21
Attending: SURGERY | Admitting: SURGERY
Payer: MEDICARE

## 2020-01-21 ENCOUNTER — ANESTHESIA (OUTPATIENT)
Dept: OPERATING ROOM | Age: 85
End: 2020-01-21
Payer: MEDICARE

## 2020-01-21 VITALS — DIASTOLIC BLOOD PRESSURE: 72 MMHG | OXYGEN SATURATION: 98 % | SYSTOLIC BLOOD PRESSURE: 103 MMHG | TEMPERATURE: 98.2 F

## 2020-01-21 VITALS
SYSTOLIC BLOOD PRESSURE: 118 MMHG | RESPIRATION RATE: 15 BRPM | DIASTOLIC BLOOD PRESSURE: 46 MMHG | TEMPERATURE: 97.5 F | BODY MASS INDEX: 19.29 KG/M2 | WEIGHT: 120 LBS | OXYGEN SATURATION: 97 % | HEIGHT: 66 IN | HEART RATE: 66 BPM

## 2020-01-21 LAB — GLUCOSE BLD-MCNC: 130 MG/DL (ref 70–99)

## 2020-01-21 PROCEDURE — 28810 AMPUTATION TOE & METATARSAL: CPT | Performed by: SURGERY

## 2020-01-21 PROCEDURE — 88311 DECALCIFY TISSUE: CPT

## 2020-01-21 PROCEDURE — 3600000002 HC SURGERY LEVEL 2 BASE: Performed by: SURGERY

## 2020-01-21 PROCEDURE — 88305 TISSUE EXAM BY PATHOLOGIST: CPT

## 2020-01-21 PROCEDURE — 82962 GLUCOSE BLOOD TEST: CPT

## 2020-01-21 PROCEDURE — 7100000011 HC PHASE II RECOVERY - ADDTL 15 MIN: Performed by: SURGERY

## 2020-01-21 PROCEDURE — 3700000001 HC ADD 15 MINUTES (ANESTHESIA): Performed by: SURGERY

## 2020-01-21 PROCEDURE — 7100000010 HC PHASE II RECOVERY - FIRST 15 MIN: Performed by: SURGERY

## 2020-01-21 PROCEDURE — 3600000012 HC SURGERY LEVEL 2 ADDTL 15MIN: Performed by: SURGERY

## 2020-01-21 PROCEDURE — 2709999900 HC NON-CHARGEABLE SUPPLY: Performed by: SURGERY

## 2020-01-21 PROCEDURE — 2580000003 HC RX 258: Performed by: ANESTHESIOLOGY

## 2020-01-21 PROCEDURE — 2500000003 HC RX 250 WO HCPCS: Performed by: NURSE ANESTHETIST, CERTIFIED REGISTERED

## 2020-01-21 PROCEDURE — 6360000002 HC RX W HCPCS: Performed by: SURGERY

## 2020-01-21 PROCEDURE — 6360000002 HC RX W HCPCS: Performed by: NURSE ANESTHETIST, CERTIFIED REGISTERED

## 2020-01-21 PROCEDURE — 6370000000 HC RX 637 (ALT 250 FOR IP): Performed by: SURGERY

## 2020-01-21 PROCEDURE — 2500000003 HC RX 250 WO HCPCS: Performed by: SURGERY

## 2020-01-21 PROCEDURE — 3700000000 HC ANESTHESIA ATTENDED CARE: Performed by: SURGERY

## 2020-01-21 RX ORDER — LIDOCAINE HYDROCHLORIDE 10 MG/ML
INJECTION, SOLUTION EPIDURAL; INFILTRATION; INTRACAUDAL; PERINEURAL
Status: COMPLETED | OUTPATIENT
Start: 2020-01-21 | End: 2020-01-21

## 2020-01-21 RX ORDER — ONDANSETRON 2 MG/ML
INJECTION INTRAMUSCULAR; INTRAVENOUS PRN
Status: DISCONTINUED | OUTPATIENT
Start: 2020-01-21 | End: 2020-01-21 | Stop reason: SDUPTHER

## 2020-01-21 RX ORDER — PROPOFOL 10 MG/ML
INJECTION, EMULSION INTRAVENOUS PRN
Status: DISCONTINUED | OUTPATIENT
Start: 2020-01-21 | End: 2020-01-21 | Stop reason: SDUPTHER

## 2020-01-21 RX ORDER — CEFAZOLIN SODIUM 2 G/100ML
2 INJECTION, SOLUTION INTRAVENOUS ONCE
Status: COMPLETED | OUTPATIENT
Start: 2020-01-21 | End: 2020-01-21

## 2020-01-21 RX ORDER — LIDOCAINE HYDROCHLORIDE 20 MG/ML
INJECTION, SOLUTION EPIDURAL; INFILTRATION; INTRACAUDAL; PERINEURAL PRN
Status: DISCONTINUED | OUTPATIENT
Start: 2020-01-21 | End: 2020-01-21 | Stop reason: SDUPTHER

## 2020-01-21 RX ORDER — SODIUM CHLORIDE, SODIUM LACTATE, POTASSIUM CHLORIDE, CALCIUM CHLORIDE 600; 310; 30; 20 MG/100ML; MG/100ML; MG/100ML; MG/100ML
INJECTION, SOLUTION INTRAVENOUS CONTINUOUS
Status: DISCONTINUED | OUTPATIENT
Start: 2020-01-21 | End: 2020-01-21 | Stop reason: HOSPADM

## 2020-01-21 RX ORDER — ACETAMINOPHEN 10 MG/ML
INJECTION, SOLUTION INTRAVENOUS PRN
Status: DISCONTINUED | OUTPATIENT
Start: 2020-01-21 | End: 2020-01-21 | Stop reason: SDUPTHER

## 2020-01-21 RX ORDER — SODIUM CHLORIDE, SODIUM LACTATE, POTASSIUM CHLORIDE, CALCIUM CHLORIDE 600; 310; 30; 20 MG/100ML; MG/100ML; MG/100ML; MG/100ML
INJECTION, SOLUTION INTRAVENOUS
Status: COMPLETED
Start: 2020-01-21 | End: 2020-01-21

## 2020-01-21 RX ORDER — DOCUSATE SODIUM 100 MG/1
100 CAPSULE, LIQUID FILLED ORAL 2 TIMES DAILY
Qty: 60 CAPSULE | Refills: 0 | Status: SHIPPED | OUTPATIENT
Start: 2020-01-21 | End: 2020-02-20

## 2020-01-21 RX ORDER — ETOMIDATE 2 MG/ML
INJECTION INTRAVENOUS PRN
Status: DISCONTINUED | OUTPATIENT
Start: 2020-01-21 | End: 2020-01-21 | Stop reason: SDUPTHER

## 2020-01-21 RX ORDER — BUPIVACAINE HYDROCHLORIDE 5 MG/ML
INJECTION, SOLUTION EPIDURAL; INTRACAUDAL
Status: COMPLETED | OUTPATIENT
Start: 2020-01-21 | End: 2020-01-21

## 2020-01-21 RX ORDER — OXYCODONE HYDROCHLORIDE AND ACETAMINOPHEN 5; 325 MG/1; MG/1
1 TABLET ORAL ONCE
Status: COMPLETED | OUTPATIENT
Start: 2020-01-21 | End: 2020-01-21

## 2020-01-21 RX ORDER — FENTANYL CITRATE 50 UG/ML
INJECTION, SOLUTION INTRAMUSCULAR; INTRAVENOUS PRN
Status: DISCONTINUED | OUTPATIENT
Start: 2020-01-21 | End: 2020-01-21 | Stop reason: SDUPTHER

## 2020-01-21 RX ORDER — OXYCODONE HYDROCHLORIDE AND ACETAMINOPHEN 5; 325 MG/1; MG/1
1 TABLET ORAL EVERY 6 HOURS PRN
Qty: 20 TABLET | Refills: 0 | Status: SHIPPED | OUTPATIENT
Start: 2020-01-21 | End: 2020-01-28

## 2020-01-21 RX ORDER — EPHEDRINE SULFATE 50 MG/ML
INJECTION, SOLUTION INTRAVENOUS PRN
Status: DISCONTINUED | OUTPATIENT
Start: 2020-01-21 | End: 2020-01-21 | Stop reason: SDUPTHER

## 2020-01-21 RX ADMIN — ETOMIDATE 6 MG: 2 INJECTION, SOLUTION INTRAVENOUS at 13:24

## 2020-01-21 RX ADMIN — ACETAMINOPHEN 500 MG: 10 INJECTION, SOLUTION INTRAVENOUS at 13:10

## 2020-01-21 RX ADMIN — PROPOFOL 20 MG: 10 INJECTION, EMULSION INTRAVENOUS at 13:35

## 2020-01-21 RX ADMIN — PROPOFOL 20 MG: 10 INJECTION, EMULSION INTRAVENOUS at 13:25

## 2020-01-21 RX ADMIN — LIDOCAINE HYDROCHLORIDE 40 MG: 20 INJECTION, SOLUTION EPIDURAL; INFILTRATION; INTRACAUDAL; PERINEURAL at 13:05

## 2020-01-21 RX ADMIN — OXYCODONE HYDROCHLORIDE AND ACETAMINOPHEN 1 TABLET: 5; 325 TABLET ORAL at 15:17

## 2020-01-21 RX ADMIN — ETOMIDATE 2 MG: 2 INJECTION, SOLUTION INTRAVENOUS at 13:05

## 2020-01-21 RX ADMIN — FENTANYL CITRATE 25 MCG: 50 INJECTION INTRAMUSCULAR; INTRAVENOUS at 13:28

## 2020-01-21 RX ADMIN — PROPOFOL 20 MG: 10 INJECTION, EMULSION INTRAVENOUS at 13:28

## 2020-01-21 RX ADMIN — EPHEDRINE SULFATE 10 MG: 50 INJECTION, SOLUTION INTRAMUSCULAR; INTRAVENOUS; SUBCUTANEOUS at 13:20

## 2020-01-21 RX ADMIN — ONDANSETRON 4 MG: 2 INJECTION INTRAMUSCULAR; INTRAVENOUS at 13:33

## 2020-01-21 RX ADMIN — EPHEDRINE SULFATE 10 MG: 50 INJECTION, SOLUTION INTRAMUSCULAR; INTRAVENOUS; SUBCUTANEOUS at 13:10

## 2020-01-21 RX ADMIN — PROPOFOL 20 MG: 10 INJECTION, EMULSION INTRAVENOUS at 13:51

## 2020-01-21 RX ADMIN — EPHEDRINE SULFATE 10 MG: 50 INJECTION, SOLUTION INTRAMUSCULAR; INTRAVENOUS; SUBCUTANEOUS at 13:27

## 2020-01-21 RX ADMIN — EPHEDRINE SULFATE 10 MG: 50 INJECTION, SOLUTION INTRAMUSCULAR; INTRAVENOUS; SUBCUTANEOUS at 13:45

## 2020-01-21 RX ADMIN — SODIUM CHLORIDE, POTASSIUM CHLORIDE, SODIUM LACTATE AND CALCIUM CHLORIDE: 600; 310; 30; 20 INJECTION, SOLUTION INTRAVENOUS at 12:27

## 2020-01-21 RX ADMIN — PROPOFOL 20 MG: 10 INJECTION, EMULSION INTRAVENOUS at 13:05

## 2020-01-21 RX ADMIN — EPHEDRINE SULFATE 10 MG: 50 INJECTION, SOLUTION INTRAMUSCULAR; INTRAVENOUS; SUBCUTANEOUS at 13:38

## 2020-01-21 RX ADMIN — FENTANYL CITRATE 25 MCG: 50 INJECTION INTRAMUSCULAR; INTRAVENOUS at 14:01

## 2020-01-21 RX ADMIN — CEFAZOLIN SODIUM 2 G: 2 INJECTION, SOLUTION INTRAVENOUS at 12:59

## 2020-01-21 RX ADMIN — PROPOFOL 20 MG: 10 INJECTION, EMULSION INTRAVENOUS at 13:43

## 2020-01-21 ASSESSMENT — PULMONARY FUNCTION TESTS
PIF_VALUE: 1
PIF_VALUE: 0
PIF_VALUE: 1
PIF_VALUE: 0
PIF_VALUE: 1
PIF_VALUE: 0
PIF_VALUE: 1
PIF_VALUE: 2
PIF_VALUE: 1
PIF_VALUE: 0
PIF_VALUE: 0
PIF_VALUE: 1
PIF_VALUE: 0
PIF_VALUE: 0
PIF_VALUE: 1
PIF_VALUE: 1
PIF_VALUE: 0
PIF_VALUE: 1
PIF_VALUE: 0

## 2020-01-21 ASSESSMENT — PAIN SCALES - GENERAL
PAINLEVEL_OUTOF10: 0
PAINLEVEL_OUTOF10: 2
PAINLEVEL_OUTOF10: 5

## 2020-01-21 ASSESSMENT — PAIN DESCRIPTION - PAIN TYPE
TYPE: SURGICAL PAIN
TYPE: SURGICAL PAIN

## 2020-01-21 ASSESSMENT — PAIN DESCRIPTION - PROGRESSION
CLINICAL_PROGRESSION: GRADUALLY WORSENING
CLINICAL_PROGRESSION: RAPIDLY IMPROVING

## 2020-01-21 ASSESSMENT — PAIN DESCRIPTION - LOCATION
LOCATION: FOOT
LOCATION: FOOT

## 2020-01-21 ASSESSMENT — PAIN DESCRIPTION - FREQUENCY
FREQUENCY: INTERMITTENT
FREQUENCY: INTERMITTENT

## 2020-01-21 ASSESSMENT — PAIN DESCRIPTION - DESCRIPTORS
DESCRIPTORS: SORE
DESCRIPTORS: SHARP;SHOOTING

## 2020-01-21 ASSESSMENT — PAIN DESCRIPTION - ONSET
ONSET: SUDDEN
ONSET: ON-GOING

## 2020-01-21 ASSESSMENT — PAIN DESCRIPTION - ORIENTATION
ORIENTATION: RIGHT
ORIENTATION: RIGHT

## 2020-01-21 ASSESSMENT — PAIN - FUNCTIONAL ASSESSMENT: PAIN_FUNCTIONAL_ASSESSMENT: 0-10

## 2020-01-21 NOTE — BRIEF OP NOTE
Brief Postoperative Note  ______________________________________________________________    Patient: Thi Riggins  YOB: 1922  MRN: [id number]  Date of Procedure: 1/21/2020    Pre-Op Diagnosis: Critical lower limb ischemia    Post-Op Diagnosis: Same       Procedure(s):  RIGHT DRY GANGRENE 1ST AND 2ND TOE AMPUTATION    Anesthesia: Monitor Anesthesia Care    Surgeon(s):  Dmitry Lester MD    Assistant: Sabrina Abdi DO PGY5    Estimated Blood Loss (mL): 50    Complications: None    Specimens:   ID Type Source Tests Collected by Time Destination   A : RIGHT FIRST AND SECOND TOE Specimen Toe SURGICAL PATHOLOGY Dmitry Lester MD 1/21/2020 1336        Implants:  * No implants in log *      Drains: * No LDAs found *    Findings: Dry gangrene of right first and second toe    Jannette Zhao MD  Date: 1/21/2020  Time: 1:41 PM

## 2020-01-21 NOTE — PROGRESS NOTES
Patient returned to room from [address] [place] [address], report received from Hilraio Crowell, [facility] and [facility]. A+Ox4,  VSS (see doc flow), assessment completed as per doc flow. Dressing to right leg is clean, dry and intact. Patient has no c/o pain. Beverage offered. Call light in reach, bed in low position. RN to continue to monitor. Will call to waiting room for family.

## 2020-01-21 NOTE — PROGRESS NOTES
CLINICAL PHARMACY NOTE: MEDS TO [address] Select Patient?: No  Total # of Prescriptions Filled: 1   The following medications were delivered to the patient:  · Oxycodone/apap 5/325mg  Total # of Interventions Completed: 1  Time Spent (min): 15    Additional Documentation:

## 2020-01-21 NOTE — H&P
PT;/FAMILY 2019- NO FURTHER INTERVENTION PLANNED AS POOR CANDIDATE    Venous stasis ulcer ([facility] 2012         Family History         Family History   Problem Relation Age of Onset    Cancer Mother      Early Death Mother      Hearing Loss Mother      Cancer Sister           Social History               Socioeconomic History    Marital status:        Spouse name: Not on file    Number of children: Not on file    Years of education: Not on file    Highest education level: Not on file   Occupational History    Occupation: Retired   Social Needs    Financial resource strain: Not on file    Food insecurity:       Worry: Not on file       Inability: Not on file   [employer] needs:       Medical: Not on file       Non-medical: Not on file   Tobacco Use    Smoking status: Former Smoker       Packs/day: 0.00       Last attempt to quit: 2000       Years since quittin.9    Smokeless tobacco: Former User    Tobacco comment: Prior Hx of 1 Pack per week, none currently   Substance and Sexual Activity    Alcohol use:  Yes       Comment: pt states \"one drink a day\"    Drug use: No    Sexual activity: Never   Lifestyle    Physical activity:       Days per week: Not on file       Minutes per session: Not on file    Stress: Not on file   Relationships    Social connections:       Talks on phone: Not on file       Gets together: Not on file       Attends Yarsani service: Not on file       Active member of club or organization: Not on file       Attends meetings of clubs or organizations: Not on file       Relationship status: Not on file    Intimate partner violence:       Fear of current or ex partner: Not on file       Emotionally abused: Not on file       Physically abused: Not on file       Forced sexual activity: Not on file   Other Topics Concern    Not on file   Social History Narrative    Not on file            Current Facility-Administered Medications          Current Negative for photophobia, redness and itching. Respiratory: Negative for apnea, choking and stridor. Cardiovascular: Negative for chest pain and palpitations. Gastrointestinal: Negative for anal bleeding, constipation and rectal pain. Endocrine: Negative for polydipsia. Genitourinary: Negative for enuresis, flank pain and hematuria. Musculoskeletal: Positive for gait problem. Negative for back pain, joint swelling and myalgias. Skin: Negative for color change and pallor. Allergic/Immunologic: Negative for environmental allergies. Neurological: Negative for syncope and speech difficulty. Psychiatric/Behavioral: Negative for confusion and hallucinations.            OBJECTIVE:  Physical Exam:    /74 (Site: Left Upper Arm, Position: Sitting, Cuff Size: Medium Adult)   Pulse 64   LMP  (LMP Unknown)   SpO2 96%       Physical Exam  Constitutional:       Appearance: She is well-developed. HENT:      Head: Normocephalic. Eyes:      Pupils: Pupils are equal, round, and reactive to light. Neck:      Musculoskeletal: Normal range of motion and neck supple. Cardiovascular:      Rate and Rhythm: Normal rate. Pulmonary:      Effort: Pulmonary effort is normal.   Abdominal:      General: There is no distension. Palpations: Abdomen is soft. There is no mass. Tenderness: There is no tenderness. There is no guarding or rebound. Musculoskeletal: Normal range of motion. Feet:    Skin:     General: Skin is warm. Neurological:      Mental Status: She is alert and oriented to person, place, and time.             ASSESSMENT:  1. Critical lower limb ischemia             PLAN:  Treatment:  Will proceed with right first and second toe amputation under MAC. Patient counseled on risks, benefits, and alternatives of treatment plan at length today. Patient states an understanding and willingness to proceed with plan.       Madonna Centeno MD

## 2020-01-21 NOTE — OP NOTE
E  eeProcedure Note:    Patient ID:  Gui Arellano  [id number]  77 y.o.  5/30/1922    Indications: Ischemic right first and second toes     Pre-operative Diagnosis: Ischemic Right first and second toes    Post-operative Diagnosis: same    Procedure:  Amputation of right first and second toes    Surgeon: Fady Trejo MD    Assistant: Richa Kulkarni DO-Resident    Findings:  same    Estimated Blood Loss:  Minimal           Total IV Fluids: 500 ml            Complications:  None; patient tolerated the procedure well. Disposition: PACU - hemodynamically stable. Condition: stable    Procedure Details: The patient was seen again in the Holding Room. The risks, benefits, complications, treatment options, and expected outcomes were discussed with the patient. The possibilities of reaction to medication, pulmonary aspiration, bleeding, recurrent infection, the need for additional procedures, and development of a complication requiring transfusion or further operation were discussed with the patient and/or family. There was concurrence with the proposed plan, and informed consent was obtained. The site of surgery was properly noted/marked. The patient was taken to the Operating Room, identified as Gui Arellano, and the procedure verified. A Time Out was held and the above information confirmed. The patient was brought to the operating room and placed supine. After the induction of anesthesia, the right foot was prepped and draped in the usual sterile fashion. Then, 0.25% Marcaine was infiltrated around the base of the first and second toes and a fish-mouth type of incision was made at the base of each toe circumferentially, and the incision was deepened through subcutaneous tissue using Bovie electrocautery with good hemostasis. The proximal tarsal bone was isolated and circumferentially dissected using the periosteal elevator.  The bone was cut at the tarsometatarsal joint and the wound was hemostatic using Bovie electrocautery. The specimen was sent to Pathology. Using 3-0 Vicryl, the fascial plane was closed covering the bony stump and 4-0 nylon was used to approximate the skin in interrupted fashion. Fluffs and Kerlix   dressings were applied. The patient was subsequently transferred to the recovery room in stable condition. Instrument and lap counts were correct at the end of the case.        Kike Valentin MD

## 2020-01-22 ENCOUNTER — TELEPHONE (OUTPATIENT)
Dept: INTERNAL MEDICINE CLINIC | Age: 85
End: 2020-01-22

## 2020-01-23 ENCOUNTER — TELEPHONE (OUTPATIENT)
Dept: INTERNAL MEDICINE CLINIC | Age: 85
End: 2020-01-23

## 2020-01-23 RX ORDER — CEPHALEXIN 250 MG/1
250 CAPSULE ORAL 3 TIMES DAILY
Qty: 21 CAPSULE | Refills: 0 | Status: SHIPPED | OUTPATIENT
Start: 2020-01-23 | End: 2020-01-30

## 2020-01-27 ENCOUNTER — OFFICE VISIT (OUTPATIENT)
Dept: INTERNAL MEDICINE CLINIC | Age: 85
End: 2020-01-27
Payer: MEDICARE

## 2020-01-27 VITALS
DIASTOLIC BLOOD PRESSURE: 82 MMHG | SYSTOLIC BLOOD PRESSURE: 118 MMHG | OXYGEN SATURATION: 99 % | HEART RATE: 68 BPM | RESPIRATION RATE: 15 BRPM

## 2020-01-27 LAB
INTERNATIONAL NORMALIZATION RATIO, POC: 1.4
PROTHROMBIN TIME, POC: 16.4

## 2020-01-27 PROCEDURE — 4040F PNEUMOC VAC/ADMIN/RCVD: CPT | Performed by: INTERNAL MEDICINE

## 2020-01-27 PROCEDURE — 1123F ACP DISCUSS/DSCN MKR DOCD: CPT | Performed by: INTERNAL MEDICINE

## 2020-01-27 PROCEDURE — 85610 PROTHROMBIN TIME: CPT | Performed by: INTERNAL MEDICINE

## 2020-01-27 PROCEDURE — G8420 CALC BMI NORM PARAMETERS: HCPCS | Performed by: INTERNAL MEDICINE

## 2020-01-27 PROCEDURE — 1036F TOBACCO NON-USER: CPT | Performed by: INTERNAL MEDICINE

## 2020-01-27 PROCEDURE — G8482 FLU IMMUNIZE ORDER/ADMIN: HCPCS | Performed by: INTERNAL MEDICINE

## 2020-01-27 PROCEDURE — 99214 OFFICE O/P EST MOD 30 MIN: CPT | Performed by: INTERNAL MEDICINE

## 2020-01-27 PROCEDURE — 1090F PRES/ABSN URINE INCON ASSESS: CPT | Performed by: INTERNAL MEDICINE

## 2020-01-27 PROCEDURE — G8427 DOCREV CUR MEDS BY ELIG CLIN: HCPCS | Performed by: INTERNAL MEDICINE

## 2020-01-27 RX ORDER — METRONIDAZOLE 500 MG/1
500 TABLET ORAL 2 TIMES DAILY
Qty: 14 TABLET | Refills: 0 | Status: SHIPPED | OUTPATIENT
Start: 2020-01-27 | End: 2020-02-03

## 2020-01-27 NOTE — PROGRESS NOTES
Santiago Flores  5/30/1922 01/27/20    SUBJECTIVE:  Hx of c diff, she has had some diarrhea the past 3d. Had abrasion noted L forearm ~1-2 weeks ago, some oozing noted and redness. Started keflex    Had R toes 1+2 amputation last week w Dr Brad Thayer. Some confusion noted postop but has been on percocet and will stop now. PVD- s/p amputation as noted w/o claudication at rest.    CVA- INR sl low at 1.4 restarting coumadin. OBJECTIVE:    /82   Pulse 68   Resp 15   LMP  (LMP Unknown)   SpO2 99%     Physical Exam  Vitals signs reviewed. Constitutional:       General: She is not in acute distress. Appearance: She is well-developed. She is not ill-appearing. HENT:      Head: Atraumatic. Nose: Nose normal. No congestion or rhinorrhea. Mouth/Throat:      Pharynx: No oropharyngeal exudate or posterior oropharyngeal erythema. Neck:      Musculoskeletal: Neck supple. Cardiovascular:      Rate and Rhythm: Normal rate and regular rhythm. Heart sounds: Normal heart sounds. No murmur. No friction rub. No gallop. Pulmonary:      Effort: Pulmonary effort is normal. No respiratory distress. Breath sounds: Normal breath sounds. No wheezing or rales. Abdominal:      General: Bowel sounds are normal. There is no distension. Palpations: Abdomen is soft. Tenderness: There is no abdominal tenderness. Skin:     Findings: Erythema present. Comments: L forearm abrasion and some tr oozing. Neurological:      General: No focal deficit present. Mental Status: She is alert. Comments: SL WEAK, FATIGUED BUT AWAKENS AND RESPONDS TO QUESTIONS. Psychiatric:         Mood and Affect: Mood normal.         ASSESSMENT:    1. Left arm cellulitis    2. Acute infectious diarrhea    3. Peripheral vascular disease ([address]    4. 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  Cerebrovascular accident (CVA) due to stenosis of cerebral artery ([address] PLAN:    Fei Garrido was seen today for abrasion, altered mental status and diarrhea. Diagnoses and all orders for this visit:    Left arm cellulitis- switch for occlusive bandage to light gauze to get more air exposure. Dry dressing changes BID, only 5d more of keflex given her current diarrhea and hx of c diff    Acute infectious diarrhea- send for stool c diff toxin and also start empiric flagyl, to push fluids. 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 Cont regimen.   HER CONFUSION SHOULD CLEAR W SUPPORTIVE CARE AND ALSO MAY BE DUE TO STRESS FR RECENT SURGERY    CONT [facility] MONITORING  -     POCT INR

## 2020-01-28 ENCOUNTER — HOSPITAL ENCOUNTER (OUTPATIENT)
Age: 85
Setting detail: SPECIMEN
Discharge: HOME | End: 2020-01-28
Payer: MEDICARE

## 2020-01-28 PROCEDURE — 87324 CLOSTRIDIUM AG IA: CPT

## 2020-01-28 PROCEDURE — G0328 FECAL BLOOD SCRN IMMUNOASSAY: HCPCS

## 2020-01-29 LAB
CLOSTRIDIUM DIFFICILE, PCR: ABNORMAL
CLOSTRIDIUM DIFFICILE, PCR: ABNORMAL
HEMOCCULT SP1 STL QL: NEGATIVE

## 2020-01-30 ENCOUNTER — TELEPHONE (OUTPATIENT)
Dept: SURGERY | Age: 85
End: 2020-01-30

## 2020-01-30 ENCOUNTER — OFFICE VISIT (OUTPATIENT)
Dept: SURGERY | Age: 85
End: 2020-01-30

## 2020-01-30 VITALS
OXYGEN SATURATION: 98 % | DIASTOLIC BLOOD PRESSURE: 72 MMHG | WEIGHT: 119.93 LBS | HEART RATE: 63 BPM | BODY MASS INDEX: 19.27 KG/M2 | HEIGHT: 66 IN | SYSTOLIC BLOOD PRESSURE: 110 MMHG

## 2020-01-30 PROCEDURE — 99024 POSTOP FOLLOW-UP VISIT: CPT | Performed by: SURGERY

## 2020-01-31 RX ORDER — LOVASTATIN 40 MG/1
40 TABLET ORAL NIGHTLY
Qty: 90 TABLET | Refills: 1 | Status: SHIPPED | OUTPATIENT
Start: 2020-01-31 | End: 2020-02-04 | Stop reason: SDUPTHER

## 2020-02-03 NOTE — PROGRESS NOTES
generalized     on chr pred started by Rheum    Peripheral vascular disease ([facility] 2008, 2008    S/P PTA and stents X 2 to Right leg- Dr Sotero Rivera Thoracic aortic aneurysm [facility])     DISCUSSED W PT;/FAMILY 2019- NO FURTHER INTERVENTION PLANNED AS POOR CANDIDATE    Venous stasis ulcer ([facility] [address] 2012    Wears dentures     full upper and lower     Family History   Problem Relation Age of Onset    Cancer Mother     Early Death Mother     Hearing Loss Mother     Cancer Sister      Social History     Socioeconomic History    Marital status:       Spouse name: Not on file    Number of children: Not on file    Years of education: Not on file    Highest education level: Not on file   Occupational History    Occupation: Retired   Social Needs    Financial resource strain: Not on file    Food insecurity:     Worry: Not on file     Inability: Not on file   [employer] needs:     Medical: Not on file     Non-medical: Not on file   Tobacco Use    Smoking status: Former Smoker     Packs/day: 0.00     Years: 60.00     Pack years: 0.00     Last attempt to quit: 2000     Years since quittin.0    Smokeless tobacco: Former User    Tobacco comment: Prior Hx of 1 Pack per week, none currently   Substance and Sexual Activity    Alcohol use: Yes     Comment: pt states \"one drink a day\"  hasn't done since end 2019    Drug use: No    Sexual activity: Never   Lifestyle    Physical activity:     Days per week: Not on file     Minutes per session: Not on file    Stress: Not on file   Relationships    Social connections:     Talks on phone: Not on file     Gets together: Not on file     Attends Voodoo service: Not on file     Active member of club or organization: Not on file     Attends meetings of clubs or organizations: Not on file     Relationship status: Not on file    Intimate partner violence:     Fear of current or ex partner: Not on file     Emotionally abused: Not on file Physically abused: Not on file     Forced sexual activity: Not on file   Other Topics Concern    Not on file   Social History Narrative    Not on file       OBJECTIVE:   Physical Exam    Wound well healed without signs of active infection. Suture line intact. Abdomen soft, nontender, nondistended. ASSESSMENT:  Upon dressing change there was some considerable amount of drainage on the incision sites. Patient doing well on this post operative check. Wounds well healed. 1. Critical lower limb ischemia        PLAN:  Dressing change and patient instructed to wash her leg and foot daily. Dry dressings to be applied. Follow-up in 1 week to remove the sutures. Continue same  Increase activity as tolerated        No orders of the defined types were placed in this encounter. No orders of the defined types were placed in this encounter. Follow Up: No follow-ups on file.     Matteo Jara MD

## 2020-02-04 ENCOUNTER — HOSPITAL ENCOUNTER (OUTPATIENT)
Age: 85
Setting detail: SPECIMEN
Discharge: HOME | End: 2020-02-04
Payer: MEDICARE

## 2020-02-04 ENCOUNTER — TELEPHONE (OUTPATIENT)
Dept: INTERNAL MEDICINE CLINIC | Age: 85
End: 2020-02-04

## 2020-02-04 LAB
INR BLD: 2.02 INDEX
PROTHROMBIN TIME: 24.6 SECONDS (ref 11.7–14.5)

## 2020-02-04 PROCEDURE — 85610 PROTHROMBIN TIME: CPT

## 2020-02-04 RX ORDER — LOVASTATIN 40 MG/1
40 TABLET ORAL 2 TIMES DAILY
Qty: 180 TABLET | Refills: 1 | Status: SHIPPED | OUTPATIENT
Start: 2020-02-04

## 2020-02-04 NOTE — RESULT ENCOUNTER NOTE
Call pt's family. protime is now therapeutic. Cont current coumadin dose and rec recheck this one week/INR.

## 2020-02-04 NOTE — TELEPHONE ENCOUNTER
Cinda, that may have been my error. I'd thought it was unusual to have 40mg twice/day as typical dose is 80mg/day.     pls call Suzi Sandoval and let him know we should stay on the 40mg BID, Place med changes/corrections on EPIC medlist please  May need new script sent

## 2020-02-04 NOTE — TELEPHONE ENCOUNTER
Pt's son Hever Johnson called to confirm directions on Lovastatin 1/31/20 script, as it states 1 tab nightly. Previous directions are 1 tab 2 times daily. Please advise.

## 2020-02-04 NOTE — TELEPHONE ENCOUNTER
Spoke w/ HOSP WILL CARRINGTON, pt's son and let him know new script for 40mg BID sent to [facility].

## 2020-02-05 ENCOUNTER — TELEPHONE (OUTPATIENT)
Dept: INTERNAL MEDICINE CLINIC | Age: 85
End: 2020-02-05

## 2020-02-05 RX ORDER — VANCOMYCIN HYDROCHLORIDE 125 MG/1
125 CAPSULE ORAL 4 TIMES DAILY
Qty: 40 CAPSULE | Refills: 0 | Status: SHIPPED | OUTPATIENT
Start: 2020-02-05 | End: 2020-02-15

## 2020-02-06 ENCOUNTER — OFFICE VISIT (OUTPATIENT)
Dept: SURGERY | Age: 85
End: 2020-02-06

## 2020-02-06 VITALS
WEIGHT: 120 LBS | BODY MASS INDEX: 19.29 KG/M2 | DIASTOLIC BLOOD PRESSURE: 60 MMHG | HEIGHT: 66 IN | HEART RATE: 74 BPM | SYSTOLIC BLOOD PRESSURE: 110 MMHG

## 2020-02-06 PROCEDURE — 99024 POSTOP FOLLOW-UP VISIT: CPT | Performed by: PHYSICIAN ASSISTANT

## 2020-02-06 PROCEDURE — APPNB30 APP NON BILLABLE TIME 0-30 MINS: Performed by: PHYSICIAN ASSISTANT

## 2020-02-06 RX ORDER — TRAMADOL HYDROCHLORIDE 50 MG/1
50 TABLET ORAL EVERY 6 HOURS PRN
Qty: 12 TABLET | Refills: 0 | Status: SHIPPED | OUTPATIENT
Start: 2020-02-06 | End: 2020-02-09

## 2020-02-06 RX ORDER — CEPHALEXIN 500 MG/1
500 CAPSULE ORAL 3 TIMES DAILY
Qty: 30 CAPSULE | Refills: 0 | Status: SHIPPED | OUTPATIENT
Start: 2020-02-06 | End: 2020-02-16

## 2020-02-06 NOTE — PROGRESS NOTES
Chief Complaint   Patient presents with    Post-Op Check     2nd P/O RT Dry Gangrene, 1st & 2nd Toe Amputations @ [facility] on 1/21/20         SUBJECTIVE:  Patient here for her 2nd post op visit following R 1st and 2nd toe amputations. Pt reports that her pain is consistently high, and seems to be getting worse, today pain is rated at a 10/10. Family in the room is concerned about wound care. They state that they are unsure if the pt is getting the dressing changed daily, or if the foot is being washed. Family has not seen the wound in greater than 1 week.        Past Surgical History:   Procedure Laterality Date    APPENDECTOMY      ARTERY SURGERY      STENT TO LEFT FEMORIAL AND ALSO TO RIGHT SUPRA FEMORIAL ARTERY    FRACTURE SURGERY  1970    unknow which side, no hardware    HYSTERECTOMY, TOTAL ABDOMINAL  1949    age 27-for fibroids    LEG SURGERY      11/20/2008-Right leg- PTA and Stent for PVD - Dr Olive Coffman:   1/2008- Right leg -PTA and Stent- Dr Orly Tapia  9/7/12    L basosquamous cell CA L leg    TOE AMPUTATION Right 1/21/2020    RIGHT DRY GANGRENE 1ST AND 2ND TOE AMPUTATION performed by Chirsto Le MD at [place] [address]       Past Medical History:   Diagnosis Date    AAA (abdominal aortic aneurysm) ([address]     DISCUSSED W PT;/FAMILY 2/2019- NO FURTHER INTERVENTION PLANNED AS POOR CANDIDATE    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 difficile colitis     x 3 ---  As of 1-7-20 being treated currently with Vancomycin    CAD (coronary artery disease)     CKD (chronic kidney disease) stage 3, GFR 30-59 ml/min ([facility])     COPD (chronic obstructive pulmonary disease) ([address]     CVA (cerebral vascular accident) ([address] 09/2018    Lt parietal speech deficits 2000  sx resolved--  2nd CVA in 2018    Gangrene ([address]     dry gangrene of 1st and 2nd toes Rt foot    Gastro-esophageal reflux disease with esophagitis     Gout     hand swelling and pain    History of blood transfusion     Hx of blood clots     leg    Hyperlipidemia     Hypertension     Impaired hearing     no hearing aids    Impaired vision     will not wear glasses    Iron deficiency anemia     Non-healing surgical wound     Osteoarthritis, generalized     on chr pred started by Rheum    Peripheral vascular disease ([facility] [address] 2008, 2008    S/P PTA and stents X 2 to Right leg- Dr Myriam Gallegos Thoracic aortic aneurysm ([facility] [address]     DISCUSSED W PT;/FAMILY 2019- NO FURTHER INTERVENTION PLANNED AS POOR CANDIDATE    Venous stasis ulcer ([facility] [address] 2012    Wears dentures     full upper and lower     Family History   Problem Relation Age of Onset    Cancer Mother     Early Death Mother     Hearing Loss Mother     Cancer Sister      Social History     Socioeconomic History    Marital status:       Spouse name: Not on file    Number of children: Not on file    Years of education: Not on file    Highest education level: Not on file   Occupational History    Occupation: Retired   Social Needs    Financial resource strain: Not on file    Food insecurity:     Worry: Not on file     Inability: Not on file   [email or web address] needs:     Medical: Not on file     Non-medical: Not on file   Tobacco Use    Smoking status: Former Smoker     Packs/day: 0.00     Years: 60.00     Pack years: 0.00     Last attempt to quit: 2000     Years since quittin.0    Smokeless tobacco: Former User    Tobacco comment: Prior Hx of 1 Pack per week, none currently   Substance and Sexual Activity    Alcohol use: Yes     Comment: pt states \"one drink a day\"  hasn't done since end 2019    Drug use: No    Sexual activity: Never   Lifestyle    Physical activity:     Days per week: Not on file     Minutes per session: Not on file    Stress: Not on file   Relationships    Social connections:     Talks on phone: Not on file     Gets together: Not on file     Attends Latter-day service: Not on file     Active member of

## 2020-02-10 ENCOUNTER — TELEPHONE (OUTPATIENT)
Dept: SURGERY | Age: 85
End: 2020-02-10

## 2020-02-11 ENCOUNTER — TELEPHONE (OUTPATIENT)
Dept: INTERNAL MEDICINE CLINIC | Age: 85
End: 2020-02-11

## 2020-02-11 NOTE — TELEPHONE ENCOUNTER
Enid Mckeon, nurse at [facility] called to report pt began taking atb on 2/5/20:    Vanco 125mg, 4x daily for 10 days (CDiff)    Cephalexin 500mg, 3x daily x 10 days (foot)    Enid Mckeon also reported that pt's INR is 4.4, and is taking 1/2 of 2mg tab of coumadin daily.

## 2020-02-13 ENCOUNTER — OFFICE VISIT (OUTPATIENT)
Dept: SURGERY | Age: 85
End: 2020-02-13

## 2020-02-13 VITALS — DIASTOLIC BLOOD PRESSURE: 62 MMHG | RESPIRATION RATE: 16 BRPM | SYSTOLIC BLOOD PRESSURE: 116 MMHG | HEART RATE: 56 BPM

## 2020-02-13 PROCEDURE — 99024 POSTOP FOLLOW-UP VISIT: CPT | Performed by: PHYSICIAN ASSISTANT

## 2020-02-13 PROCEDURE — APPNB30 APP NON BILLABLE TIME 0-30 MINS: Performed by: PHYSICIAN ASSISTANT

## 2020-02-13 NOTE — PROGRESS NOTES
Chief Complaint   Patient presents with    Post-Op Check     PO#3 OR right dry gangrene [facility]st and [facility]         SUBJECTIVE:  Patient here for her 3rd post op visit following R 1st and 2nd toe amputations. Reports that pain is improved today. Is taking abx as prescribed. (On Vanc and Keflex)    The patient has had a lot of care in the past at the wound center, and the family has requested that the pt go back there for care if we agree with that plan.     Past Surgical History:   Procedure Laterality Date    APPENDECTOMY      ARTERY SURGERY      STENT TO LEFT FEMORIAL AND ALSO TO RIGHT SUPRA FEMORIAL ARTERY    FRACTURE SURGERY  1970    unknow which side, no hardware    HYSTERECTOMY, TOTAL ABDOMINAL  1949    age 27-for fibroids    LEG SURGERY      11/20/2008-Right leg- PTA and Stent for PVD - Dr Denney Shone:   1/2008- Right leg -PTA and Stent- Dr Sindi Avila  9/7/12    L basosquamous cell CA L leg    TOE AMPUTATION Right 1/21/2020    RIGHT DRY GANGRENE 1ST AND 2ND TOE AMPUTATION performed by Tyron Jenkins MD at [place] [address]       Past Medical History:   Diagnosis Date    AAA (abdominal aortic aneurysm) ([address]     DISCUSSED W PT;/FAMILY 2/2019- NO FURTHER INTERVENTION PLANNED AS POOR CANDIDATE    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 difficile colitis     x 3 ---  As of 1-7-20 being treated currently with Vancomycin    CAD (coronary artery disease)     CKD (chronic kidney disease) stage 3, GFR 30-59 ml/min ([facility])     COPD (chronic obstructive pulmonary disease) ([address]     CVA (cerebral vascular accident) ([address] 09/2018    Lt parietal speech deficits 2000  sx resolved--  2nd CVA in 2018    Gangrene ([address]     dry gangrene of 1st and 2nd toes Rt foot    Gastro-esophageal reflux disease with esophagitis     Gout     hand swelling and pain    History of blood transfusion     Hx of blood clots     leg    Hyperlipidemia     Hypertension     Impaired Relationship status: Not on file    Intimate partner violence:     Fear of current or ex partner: Not on file     Emotionally abused: Not on file     Physically abused: Not on file     Forced sexual activity: Not on file   Other Topics Concern    Not on file   Social History Narrative    Not on file       OBJECTIVE:   Physical Exam    Surgical site infection is improved. There continues to be erythema around the incision. There is diffuse tenderness that has improved since last week. There are 3 small wounds to the lateral right foot that were not there at her last visit. ASSESSMENT:  Patient doing well on this post operative check. Improvement to the incisional infection today. 1. Critical lower limb ischemia    2. Post-op pain    3. Postoperative infection, unspecified type, subsequent encounter        PLAN:  I agree with continued f/u at the wound center. Continue daily local wound care in the meantime. Continue Abx until the course is completed. Orders Placed This Encounter   Procedures    Giovanni Kramer 28.        No orders of the defined types were placed in this encounter. Follow Up: Return if symptoms worsen or fail to improve.     Pilar Carlos PA-C

## 2020-02-18 ENCOUNTER — HOSPITAL ENCOUNTER (OUTPATIENT)
Age: 85
Setting detail: SPECIMEN
Discharge: HOME | End: 2020-02-18
Payer: MEDICARE

## 2020-02-18 LAB
INR BLD: 1.68 INDEX
PROTHROMBIN TIME: 20.4 SECONDS (ref 11.7–14.5)

## 2020-02-18 PROCEDURE — 85610 PROTHROMBIN TIME: CPT

## 2020-02-19 ENCOUNTER — OFFICE VISIT (OUTPATIENT)
Dept: INTERNAL MEDICINE CLINIC | Age: 85
End: 2020-02-19
Payer: MEDICARE

## 2020-02-19 ENCOUNTER — ANTI-COAG VISIT (OUTPATIENT)
Dept: INTERNAL MEDICINE CLINIC | Age: 85
End: 2020-02-19

## 2020-02-19 VITALS
HEART RATE: 69 BPM | RESPIRATION RATE: 14 BRPM | OXYGEN SATURATION: 95 % | DIASTOLIC BLOOD PRESSURE: 70 MMHG | SYSTOLIC BLOOD PRESSURE: 112 MMHG

## 2020-02-19 PROCEDURE — 99213 OFFICE O/P EST LOW 20 MIN: CPT | Performed by: INTERNAL MEDICINE

## 2020-02-19 PROCEDURE — G8482 FLU IMMUNIZE ORDER/ADMIN: HCPCS | Performed by: INTERNAL MEDICINE

## 2020-02-19 PROCEDURE — G8420 CALC BMI NORM PARAMETERS: HCPCS | Performed by: INTERNAL MEDICINE

## 2020-02-19 PROCEDURE — 1090F PRES/ABSN URINE INCON ASSESS: CPT | Performed by: INTERNAL MEDICINE

## 2020-02-19 PROCEDURE — 1036F TOBACCO NON-USER: CPT | Performed by: INTERNAL MEDICINE

## 2020-02-19 PROCEDURE — G8427 DOCREV CUR MEDS BY ELIG CLIN: HCPCS | Performed by: INTERNAL MEDICINE

## 2020-02-19 PROCEDURE — 1123F ACP DISCUSS/DSCN MKR DOCD: CPT | Performed by: INTERNAL MEDICINE

## 2020-02-19 PROCEDURE — 4040F PNEUMOC VAC/ADMIN/RCVD: CPT | Performed by: INTERNAL MEDICINE

## 2020-02-19 RX ORDER — FAMOTIDINE 40 MG/1
40 TABLET, FILM COATED ORAL EVERY EVENING
Qty: 30 TABLET | Refills: 3 | Status: SHIPPED | OUTPATIENT
Start: 2020-02-19

## 2020-02-19 RX ORDER — HYDRALAZINE HYDROCHLORIDE 25 MG/1
25 TABLET, FILM COATED ORAL 3 TIMES DAILY
Qty: 90 TABLET | Refills: 1 | Status: SHIPPED | OUTPATIENT
Start: 2020-02-19

## 2020-02-19 RX ORDER — TRAMADOL HYDROCHLORIDE 50 MG/1
50 TABLET ORAL EVERY 6 HOURS PRN
Qty: 30 TABLET | Refills: 0 | Status: SHIPPED | OUTPATIENT
Start: 2020-02-19 | End: 2020-03-05

## 2020-02-19 RX ORDER — HYDROCHLOROTHIAZIDE 12.5 MG/1
12.5 TABLET ORAL DAILY
Qty: 30 TABLET | Refills: 3 | Status: SHIPPED | OUTPATIENT
Start: 2020-02-19

## 2020-02-19 NOTE — PROGRESS NOTES
Sandi Jean Baptiste  5/30/1922 02/19/20    SUBJECTIVE:  S/p R great toe amputation and #2 on 1/21. Surgery noted some drainage 2/13 and April CUNNINGHAM started keflex    Diarrhea better after on vanco.    OBJECTIVE:    /70   Pulse 69   Resp 14   LMP  (LMP Unknown)   SpO2 95%     Physical Exam  Constitutional:       Appearance: Normal appearance. Musculoskeletal:        Feet:    Feet:      Comments: Draining areas base of toes 1+2 on R, lateral foot also w small area of dry necrosis. Skin:     Comments: Necrotic draining area noted R base of toes 1+2 amputation sites. Dry ?gangrenous? area noted R lateral foot. Neurological:      Mental Status: She is alert. ASSESSMENT:    1. Cellulitis of right foot    2. Arterial insufficiency with ischemic ulcer ([address]    3. Essential hypertension    4. Gastro-esophageal reflux disease with esophagitis        PLAN:  For foot pain will rf prn tramadol up to BID, watching for somnolence. w persistent infection postop and also area of possible new vasc dry necrosis, may need further amputation if wound care is ineffective? F/u closely w me in two weeks. If no improvement will then need further eval w Dr Amelia Almanza, if needs further amputation. bp stable and cont meds. Cont rx for reflux which is stable    Humble Marquis was seen today for toe pain. Diagnoses and all orders for this visit:    Cellulitis of right foot  -     traMADol (ULTRAM) 50 MG tablet; Take 1 tablet by mouth every 6 hours as needed for Pain for up to 15 days. Intended supply: 7 days. Take lowest dose possible to manage pain    Arterial insufficiency with ischemic ulcer (HCC)  -     traMADol (ULTRAM) 50 MG tablet; Take 1 tablet by mouth every 6 hours as needed for Pain for up to 15 days. Intended supply: 7 days. Take lowest dose possible to manage pain    Essential hypertension  -     hydrALAZINE (APRESOLINE) 25 MG tablet;  Take 1 tablet by mouth 3 times daily  -     hydrochlorothiazide (HYDRODIURIL) 12.5 MG tablet; Take 1 tablet by mouth daily    Gastro-esophageal reflux disease with esophagitis  -     famotidine (PEPCID) 40 MG tablet;  Take 1 tablet by mouth every evening

## 2020-02-20 ENCOUNTER — HOSPITAL ENCOUNTER (OUTPATIENT)
Dept: WOUND CARE | Age: 85
Discharge: HOME | End: 2020-02-20
Payer: MEDICARE

## 2020-02-20 VITALS
SYSTOLIC BLOOD PRESSURE: 107 MMHG | TEMPERATURE: 98 F | RESPIRATION RATE: 17 BRPM | HEART RATE: 68 BPM | DIASTOLIC BLOOD PRESSURE: 58 MMHG

## 2020-02-20 PROCEDURE — 99214 OFFICE O/P EST MOD 30 MIN: CPT

## 2020-02-20 PROCEDURE — 87073 CULTURE BACTERIA ANAEROBIC: CPT

## 2020-02-20 PROCEDURE — 99213 OFFICE O/P EST LOW 20 MIN: CPT | Performed by: NURSE PRACTITIONER

## 2020-02-20 PROCEDURE — 87077 CULTURE AEROBIC IDENTIFY: CPT

## 2020-02-20 PROCEDURE — 87071 CULTURE AEROBIC QUANT OTHER: CPT

## 2020-02-20 PROCEDURE — 87186 SC STD MICRODIL/AGAR DIL: CPT

## 2020-02-20 RX ORDER — LIDOCAINE HYDROCHLORIDE 40 MG/ML
SOLUTION TOPICAL ONCE
Status: DISCONTINUED | OUTPATIENT
Start: 2020-02-20 | End: 2020-02-21 | Stop reason: HOSPADM

## 2020-02-20 NOTE — PROGRESS NOTES
[facility] Initial Visit      Batsheva Mejía  AGE: 80 y.o. GENDER: female  : 1922  EPISODE DATE:  2020   Referred by: Dr. Aliya Méndez:     Kandi Rangel     HISTORY of PRESENT ILLNESS      Batsheva Mejía is a 80 y.o. female who presents to the [address] [place] [address] for an initial visit for evaluation and treatment of Acute non-healing surgical  wounds of the right great and second toe. She has chronic arterial wounds on the right medial and lateral foot. The condition is of marked severity. The ulcers have been present for approximately 3 weeks. The patient under went amputation of her right great and 2nd toe on 2020. She had an angiogram approximately 3 months prior to that. The underlying cause is thought to be arterial and nonhealing surgical  The patients care to date has included betadine and dressings. The patient has significant underlying medical conditions as below.      Wound Pain Timing/Severity: moderate  Quality of pain: sharp, aching, burning, throbbing  Severity of pain:   10   Modifying Factors: decreased mobility, arterial insufficiency, decreased tissue oxygenation and malnutrition  Associated Signs/Symptoms: drainage and pain        PAST MEDICAL HISTORY        Diagnosis Date    AAA (abdominal aortic aneurysm) ([address]     DISCUSSED W PT;/FAMILY 2019- NO FURTHER INTERVENTION PLANNED AS POOR CANDIDATE    Basosquamous carcinoma     L leg - Dr Mendel Cavanaugh. difficile colitis     x 3 ---  As of 20 being treated currently with Vancomycin    CAD (coronary artery disease)     CKD (chronic kidney disease) stage 3, GFR 30-59 ml/min ([facility]     COPD (chronic obstructive pulmonary disease) ([address]     CVA (cerebral vascular accident) ([address] 2018    Lt parietal speech deficits 2000  sx resolved--  2nd CVA in 2018    Gangrene ([address]     dry gangrene of 1st and 2nd toes Rt foot    Gastro-esophageal reflux disease with esophagitis  Gout     hand swelling and pain    History of blood transfusion     Hx of blood clots     leg    Hyperlipidemia     Hypertension     Impaired hearing     no hearing aids    Impaired vision     will not wear glasses    Iron deficiency anemia     Non-healing surgical wound     Osteoarthritis, generalized     on chr pred started by Rheum    Peripheral vascular disease ([facility] 2008, 2008    S/P PTA and stents X 2 to Right leg- Dr Myriam Gallegos Thoracic aortic aneurysm ([facility] [address]     DISCUSSED W PT;/FAMILY 2019- NO FURTHER INTERVENTION PLANNED AS POOR CANDIDATE    Venous stasis ulcer ([address] 2012    Wears dentures     full upper and lower       PAST SURGICAL HISTORY    Past Surgical History:   Procedure Laterality Date    APPENDECTOMY      ARTERY SURGERY      STENT TO LEFT FEMORIAL AND ALSO TO RIGHT SUPRA FEMORIAL ARTERY    FRACTURE SURGERY      unknow which side, no hardware    HYSTERECTOMY, TOTAL ABDOMINAL  1949    age 27-for fibroids    LEG SURGERY      2008-Right leg- PTA and Stent for PVD - Dr Arce Gent:   2008- Right leg -PTA and Stent- Dr Bhagat [facility]  12    L basosquamous cell CA L leg    TOE AMPUTATION Right 2020    RIGHT DRY GANGRENE 1ST AND 2ND TOE AMPUTATION performed by Kristin Alvarado MD at [facility] HISTORY    Family History   Problem Relation Age of Onset    Cancer Mother     Early Death Mother     Hearing Loss Mother     Cancer Sister        SOCIAL HISTORY    Social History     Tobacco Use    Smoking status: Former Smoker     Packs/day: 0.00     Years: 60.00     Pack years: 0.00     Last attempt to quit: 2000     Years since quittin.0    Smokeless tobacco: Former User    Tobacco comment: Prior Hx of 1 Pack per week, none currently   Substance Use Topics    Alcohol use: Yes     Comment: pt states \"one drink a day\"  hasn't done since end 2019    Drug use: No       ALLERGIES    Allergies   Allergen Black%Wound Bed 10 2/20/2020  2:46 PM   Purple%Wound Bed 0 2/20/2020  2:46 PM   Other%Wound Bed 0 2/20/2020  2:46 PM   Number of days: 0       Wound 02/20/20 Foot Right;Medial #21 Right Medial Foot (Active)   Wound Image   2/20/2020  2:46 PM   Wound Cleansed Soap and water 2/20/2020  2:46 PM   Wound Length (cm) 2.2 cm 2/20/2020  2:46 PM   Wound Width (cm) 4.9 cm 2/20/2020  2:46 PM   Wound Depth (cm) 0.4 cm 2/20/2020  2:46 PM   Wound Surface Area (cm^2) 10.78 cm^2 2/20/2020  2:46 PM   Wound Volume (cm^3) 4.31 cm^3 2/20/2020  2:46 PM   Distance Tunneling (cm) 0 cm 2/20/2020  2:46 PM   Tunneling Position ___ O'Clock 0 2/20/2020  2:46 PM   Undermining Starts ___ O'Clock 0 2/20/2020  2:46 PM   Undermining Ends___ O'Clock 0 2/20/2020  2:46 PM   Undermining Maxium Distance (cm) 0 2/20/2020  2:46 PM   Wound Assessment Yellow;Black 2/20/2020  2:46 PM   Drainage Amount Moderate 2/20/2020  2:46 PM   Drainage Description Purulent 2/20/2020  2:46 PM   Odor Mild 2/20/2020  2:46 PM   Margins Defined edges 2/20/2020  2:46 PM   Supriya-wound Assessment Red 2/20/2020  2:46 PM   Non-staged Wound Description Full thickness 2/20/2020  2:46 PM   Erma%Wound Bed 0 2/20/2020  2:46 PM   Red%Wound Bed 0 2/20/2020  2:46 PM   Yellow%Wound Bed 10 2/20/2020  2:46 PM   Black%Wound Bed 90 2/20/2020  2:46 PM   Purple%Wound Bed 0 2/20/2020  2:46 PM   Other%Wound Bed 0 2/20/2020  2:46 PM   Number of days: 0       Wound 02/20/20 Foot Right;Lateral #22 Right lateral foot (Active)   Wound Image   2/20/2020  2:46 PM   Wound Cleansed Soap and water 2/20/2020  2:46 PM   Wound Length (cm) 4.6 cm 2/20/2020  2:46 PM   Wound Width (cm) 1 cm 2/20/2020  2:46 PM   Wound Depth (cm) 0.1 cm 2/20/2020  2:46 PM   Wound Surface Area (cm^2) 4.6 cm^2 2/20/2020  2:46 PM   Wound Volume (cm^3) 0.46 cm^3 2/20/2020  2:46 PM   Distance Tunneling (cm) 0 cm 2/20/2020  2:46 PM   Tunneling Position ___ O'Clock 0 2/20/2020  2:46 PM   Undermining Starts ___ O'Clock 0 2/20/2020  2:46

## 2020-02-21 ENCOUNTER — TELEPHONE (OUTPATIENT)
Dept: INTERNAL MEDICINE CLINIC | Age: 85
End: 2020-02-21

## 2020-02-21 NOTE — TELEPHONE ENCOUNTER
Gema Jones from [facility] called to verify pt's coumadin dose/lab results. Gave her protime results from 2/18/20 w/ plan of care and verbal order for lab to be drawn on 3/4/20 (also in [facility] [address]). Spoke w/ Arvind, pt's son and confirmed pt is taking 1mg daily.

## 2020-02-23 LAB
CULTURE: ABNORMAL
Lab: ABNORMAL
SPECIMEN: ABNORMAL

## 2020-02-24 ENCOUNTER — TELEPHONE (OUTPATIENT)
Dept: WOUND CARE | Age: 85
End: 2020-02-24

## 2020-02-24 RX ORDER — GREEN TEA/HOODIA GORDONII 315-12.5MG
1 CAPSULE ORAL 2 TIMES DAILY
Qty: 60 TABLET | Refills: 0 | Status: SHIPPED | OUTPATIENT
Start: 2020-02-24 | End: 2020-03-06 | Stop reason: SDUPTHER

## 2020-02-24 RX ORDER — DOXYCYCLINE HYCLATE 100 MG
100 TABLET ORAL 2 TIMES DAILY
Qty: 20 TABLET | Refills: 0 | Status: SHIPPED | OUTPATIENT
Start: 2020-02-24 | End: 2020-03-05

## 2020-02-26 ENCOUNTER — HOSPITAL ENCOUNTER (OUTPATIENT)
Dept: WOUND CARE | Age: 85
Discharge: HOME | End: 2020-02-26
Payer: MEDICARE

## 2020-02-26 VITALS
TEMPERATURE: 97 F | DIASTOLIC BLOOD PRESSURE: 58 MMHG | HEART RATE: 67 BPM | SYSTOLIC BLOOD PRESSURE: 102 MMHG | RESPIRATION RATE: 16 BRPM

## 2020-02-26 PROBLEM — L97.914: Chronic | Status: ACTIVE | Noted: 2020-02-26

## 2020-02-26 PROCEDURE — 99214 OFFICE O/P EST MOD 30 MIN: CPT

## 2020-02-26 NOTE — PLAN OF CARE
Problem: Wound:  Intervention: Assess ankle, calf, or foot circumference blilaterally  Note:   SEE FLOW SHEET  Intervention: Assess pain status  Note:   SEE FLOW HSEET  Intervention: Assess wound size, appearance and drainage  Note:   SEE FLOW SHEET  Intervention: Assess pedal pulses bilaterally if patient has a foot or leg ulcer  Note:   SEE FLOW SHEET  Intervention: Doppler if unable to palpate pedal pulse  Note:   SEE FLOW SHEET

## 2020-02-26 NOTE — PROGRESS NOTES
HPI.    Constitutional: Negative for systemic symptoms including fever, chills and malaise. Objective:      BP (!) 102/58   Pulse 67   Temp 97 °F (36.1 °C) (Temporal)   Resp 16   LMP  (LMP Unknown)     PHYSICAL EXAM      General: The patient is in no acute distress. Mental status:  Patient is appropriate, is  oriented to place and plan of care. Dermatologic exam: Visual inspection of the periwound reveals the skin to be hot and edematous. Wound exam:  see wound description below     All active wounds listed below with today's date are evaluated      Wound 02/20/20 Foot Right #19 Right 2nd Toe Amp Site (Active)   Wound Image   2/20/2020  2:55 PM   Wound Arterial 2/20/2020  4:04 PM   Dressing Status Clean;Dry; Intact 2/26/2020 11:34 AM   Dressing Changed Changed/New 2/26/2020 11:34 AM   Wound Cleansed Soap and water 2/20/2020  2:46 PM   Wound Length (cm) 2.3 cm 2/26/2020 10:17 AM   Wound Width (cm) 1 cm 2/26/2020 10:17 AM   Wound Depth (cm) 0.2 cm 2/26/2020 10:17 AM   Wound Surface Area (cm^2) 2.3 cm^2 2/26/2020 10:17 AM   Change in Wound Size % (l*w) 8 2/26/2020 10:17 AM   Wound Volume (cm^3) 0.46 cm^3 2/26/2020 10:17 AM   Wound Healing % 54 2/26/2020 10:17 AM   Distance Tunneling (cm) 0 cm 2/26/2020 10:17 AM   Tunneling Position ___ O'Clock 0 2/26/2020 10:17 AM   Undermining Starts ___ O'Clock 0 2/26/2020 10:17 AM   Undermining Ends___ O'Clock 0 2/26/2020 10:17 AM   Undermining Maxium Distance (cm) 0 2/26/2020 10:17 AM   Wound Assessment Red;Yellow 2/26/2020 10:17 AM   Drainage Amount Large 2/26/2020 10:17 AM   Drainage Description Serosanguinous 2/26/2020 10:17 AM   Odor None 2/26/2020 10:17 AM   Margins Defined edges 2/26/2020 10:17 AM   Supriya-wound Assessment Red 2/26/2020 10:17 AM   Non-staged Wound Description Full thickness 2/26/2020 10:17 AM   [place]%Wound Bed 0 2/26/2020 10:17 AM   Red%Wound Bed 40 2/26/2020 10:17 AM   Yellow%Wound Bed 60 2/26/2020 10:17 AM   Black%Wound Bed 0 2/26/2020 10:17 AM Purple%Wound Bed 0 2/26/2020 10:17 AM   Other%Wound Bed 0 2/26/2020 10:17 AM   Number of days: 5       Wound 02/20/20 Foot Right #20 Right Great Toe Amp Site (Active)   Wound Image   2/20/2020  2:55 PM   Wound Arterial 2/20/2020  4:04 PM   Dressing Status Clean;Dry; Intact 2/26/2020 11:34 AM   Dressing Changed Changed/New 2/26/2020 11:34 AM   Wound Cleansed Soap and water 2/20/2020  2:46 PM   Wound Length (cm) 2.5 cm 2/26/2020 10:17 AM   Wound Width (cm) 4.7 cm 2/26/2020 10:17 AM   Wound Depth (cm) 0.3 cm 2/26/2020 10:17 AM   Wound Surface Area (cm^2) 11.75 cm^2 2/26/2020 10:17 AM   Change in Wound Size % (l*w) -226.39 2/26/2020 10:17 AM   Wound Volume (cm^3) 3.52 cm^3 2/26/2020 10:17 AM   Wound Healing % -144 2/26/2020 10:17 AM   Distance Tunneling (cm) 0 cm 2/26/2020 10:17 AM   Tunneling Position ___ O'Clock 0 2/26/2020 10:17 AM   Undermining Starts ___ O'Clock 0 2/26/2020 10:17 AM   Undermining Ends___ O'Clock 0 2/26/2020 10:17 AM   Undermining Maxium Distance (cm) 0 2/26/2020 10:17 AM   Wound Assessment Black; Yellow 2/26/2020 10:17 AM   Drainage Amount Large 2/26/2020 10:17 AM   Drainage Description Purulent 2/26/2020 10:17 AM   Odor Mild 2/26/2020 10:17 AM   Margins Defined edges 2/26/2020 10:17 AM   Exposed structure Bone 2/26/2020 10:17 AM   Supriya-wound Assessment Red 2/26/2020 10:17 AM   Non-staged Wound Description Full thickness 2/26/2020 10:17 AM   [place]%Wound Bed 0 2/26/2020 10:17 AM   Red%Wound Bed 0 2/26/2020 10:17 AM   Yellow%Wound Bed 50 2/26/2020 10:17 AM   Black%Wound Bed 50 2/26/2020 10:17 AM   Purple%Wound Bed 0 2/26/2020 10:17 AM   Other%Wound Bed 0 2/26/2020 10:17 AM   Number of days: 5       Wound 02/20/20 Foot Right;Medial #21 Right Medial Foot (Active)   Wound Image   2/20/2020  2:46 PM   Wound Arterial 2/20/2020  4:04 PM   Dressing Status Clean;Dry; Intact 2/26/2020 11:34 AM   Dressing Changed Changed/New 2/26/2020 11:34 AM   Wound Cleansed Soap and water 2/20/2020  2:46 PM   Wound Length (cm) 0 cm 2/26/2020 10:17 AM   Wound Width (cm) 0 cm 2/26/2020 10:17 AM   Wound Depth (cm) 0 cm 2/26/2020 10:17 AM   Wound Surface Area (cm^2) 0 cm^2 2/26/2020 10:17 AM   Change in Wound Size % (l*w) 100 2/26/2020 10:17 AM   Wound Volume (cm^3) 0 cm^3 2/26/2020 10:17 AM   Wound Healing % 100 2/26/2020 10:17 AM   Distance Tunneling (cm) 0 cm 2/20/2020  2:46 PM   Tunneling Position ___ O'Clock 0 2/20/2020  2:46 PM   Undermining Starts ___ O'Clock 0 2/20/2020  2:46 PM   Undermining Ends___ O'Clock 0 2/20/2020  2:46 PM   Undermining Maxium Distance (cm) 0 2/20/2020  2:46 PM   Wound Assessment Yellow;Black 2/20/2020  2:46 PM   Drainage Amount Moderate 2/20/2020  2:46 PM   Drainage Description Purulent 2/20/2020  2:46 PM   Odor Mild 2/20/2020  2:46 PM   Margins Defined edges 2/20/2020  2:46 PM   Supriya-wound Assessment Red 2/20/2020  2:46 PM   Non-staged Wound Description Full thickness 2/20/2020  2:46 PM   [place]%Wound Bed 0 2/20/2020  2:46 PM   Red%Wound Bed 0 2/20/2020  2:46 PM   Yellow%Wound Bed 10 2/20/2020  2:46 PM   Black%Wound Bed 90 2/20/2020  2:46 PM   Purple%Wound Bed 0 2/20/2020  2:46 PM   Other%Wound Bed 0 2/20/2020  2:46 PM   Number of days: 5       Wound 02/20/20 Foot Right;Lateral #22 Right lateral foot (Active)   Wound Image   2/20/2020  2:46 PM   Wound Arterial 2/20/2020  4:04 PM   Dressing Status Clean;Dry; Intact 2/26/2020 11:34 AM   Dressing Changed Changed/New 2/26/2020 11:34 AM   Wound Cleansed Rinsed/Irrigated with saline 2/26/2020 10:17 AM   Wound Length (cm) 4.2 cm 2/26/2020 10:17 AM   Wound Width (cm) 0.8 cm 2/26/2020 10:17 AM   Wound Depth (cm) 0.2 cm 2/26/2020 10:17 AM   Wound Surface Area (cm^2) 3.36 cm^2 2/26/2020 10:17 AM   Change in Wound Size % (l*w) 26.96 2/26/2020 10:17 AM   Wound Volume (cm^3) 0.67 cm^3 2/26/2020 10:17 AM   Wound Healing % -46 2/26/2020 10:17 AM   Distance Tunneling (cm) 0 cm 2/26/2020 10:17 AM   Tunneling Position ___ O'Clock 0 2/26/2020 10:17 AM   Undermining Starts ___ Instructions Given            Electronically signed by Shubham Renteria MD on 2/26/2020 at 1:42 PM

## 2020-03-04 ENCOUNTER — TELEPHONE (OUTPATIENT)
Dept: INTERNAL MEDICINE CLINIC | Age: 85
End: 2020-03-04

## 2020-03-06 ENCOUNTER — OFFICE VISIT (OUTPATIENT)
Dept: INTERNAL MEDICINE CLINIC | Age: 85
End: 2020-03-06
Payer: MEDICARE

## 2020-03-06 VITALS
SYSTOLIC BLOOD PRESSURE: 109 MMHG | HEART RATE: 72 BPM | RESPIRATION RATE: 16 BRPM | OXYGEN SATURATION: 96 % | DIASTOLIC BLOOD PRESSURE: 60 MMHG

## 2020-03-06 PROCEDURE — G8482 FLU IMMUNIZE ORDER/ADMIN: HCPCS | Performed by: INTERNAL MEDICINE

## 2020-03-06 PROCEDURE — 99213 OFFICE O/P EST LOW 20 MIN: CPT | Performed by: INTERNAL MEDICINE

## 2020-03-06 PROCEDURE — 1090F PRES/ABSN URINE INCON ASSESS: CPT | Performed by: INTERNAL MEDICINE

## 2020-03-06 PROCEDURE — 1123F ACP DISCUSS/DSCN MKR DOCD: CPT | Performed by: INTERNAL MEDICINE

## 2020-03-06 PROCEDURE — 1036F TOBACCO NON-USER: CPT | Performed by: INTERNAL MEDICINE

## 2020-03-06 PROCEDURE — 4040F PNEUMOC VAC/ADMIN/RCVD: CPT | Performed by: INTERNAL MEDICINE

## 2020-03-06 PROCEDURE — G8420 CALC BMI NORM PARAMETERS: HCPCS | Performed by: INTERNAL MEDICINE

## 2020-03-06 PROCEDURE — G8427 DOCREV CUR MEDS BY ELIG CLIN: HCPCS | Performed by: INTERNAL MEDICINE

## 2020-03-06 RX ORDER — DOXYCYCLINE HYCLATE 100 MG/1
100 CAPSULE ORAL 2 TIMES DAILY
Qty: 60 CAPSULE | Refills: 2 | Status: SHIPPED | OUTPATIENT
Start: 2020-03-06 | End: 2020-04-05

## 2020-03-06 RX ORDER — DOXYCYCLINE HYCLATE 100 MG/1
CAPSULE ORAL
COMMUNITY
Start: 2020-02-24 | End: 2020-03-06 | Stop reason: SDUPTHER

## 2020-03-06 RX ORDER — GREEN TEA/HOODIA GORDONII 315-12.5MG
1 CAPSULE ORAL 2 TIMES DAILY
Qty: 60 TABLET | Refills: 2 | Status: SHIPPED | OUTPATIENT
Start: 2020-03-06 | End: 2020-04-05

## 2020-03-06 NOTE — PROGRESS NOTES
daily    Cerebrovascular accident (CVA) due to thrombosis of cerebral artery ([facility] [address]- cont protime as long as stable w current abx, but may be harder to adjust as she continues to deteriorate clinically. Wound infection  -     doxycycline hyclate (VIBRAMYCIN) 100 MG capsule;  Take 1 capsule by mouth 2 times daily  -     Lactobacillus (PROBIOTIC ACIDOPHILUS) TABS; Take 1 tablet by mouth 2 times daily

## 2020-03-09 ENCOUNTER — TELEPHONE (OUTPATIENT)
Dept: INTERNAL MEDICINE CLINIC | Age: 85
End: 2020-03-09

## 2020-03-16 ENCOUNTER — TELEPHONE (OUTPATIENT)
Dept: INTERNAL MEDICINE CLINIC | Age: 85
End: 2020-03-16

## 2020-03-16 RX ORDER — WARFARIN SODIUM 1 MG/1
0.5 TABLET ORAL DAILY
Qty: 90 TABLET | Refills: 1
Start: 2020-03-16 | End: 2020-03-16

## 2020-03-16 RX ORDER — WARFARIN SODIUM 1 MG/1
0.5 TABLET ORAL DAILY
Qty: 90 TABLET | Refills: 1
Start: 2020-03-16

## 2020-03-24 ENCOUNTER — TELEPHONE (OUTPATIENT)
Dept: INTERNAL MEDICINE CLINIC | Age: 85
End: 2020-03-24

## (undated) DEVICE — GAUZE,SPONGE,4"X4",16PLY,XRAY,STRL,LF: Brand: MEDLINE

## (undated) DEVICE — PACK,BASIC,IX: Brand: MEDLINE

## (undated) DEVICE — GLOVE ORANGE PI 7 1/2   MSG9075

## (undated) DEVICE — BANDAGE COMPR W4INXL5YD WHT BGE POLY COT M E WRP WV HK AND

## (undated) DEVICE — GOWN,SIRUS,POLYRNF,BRTHSLV,XLN/XL,20/CS: Brand: MEDLINE

## (undated) DEVICE — TRAY PREP DRY W/ PREM GLV 2 APPL 6 SPNG 2 UNDPD 1 OVERWRAP

## (undated) DEVICE — SUTURE VCRL SZ 3-0 L18IN ABSRB UD L26MM SH 1/2 CIR J864D

## (undated) DEVICE — PENCIL ES CRD L10FT HND SWCHING ROCK SWCH W/ EDGE COAT BLDE

## (undated) DEVICE — ELECTRODE ES AD CRDLSS PT RET REM POLYHESIVE

## (undated) DEVICE — MARKER SURG SKIN UTIL REGULAR/FINE 2 TIP W/ RUL AND 9 LBL

## (undated) DEVICE — DRAPE SHEET ULTRAGARD: Brand: MEDLINE

## (undated) DEVICE — PAD,ABDOMINAL,5"X9",ST,LF,25/BX: Brand: MEDLINE INDUSTRIES, INC.

## (undated) DEVICE — SPONGE GZ W4XL8IN COT WVN 12 PLY

## (undated) DEVICE — TOWEL,OR,DSP,ST,BLUE,STD,6/PK,12PK/CS: Brand: MEDLINE

## (undated) DEVICE — ZIMMER® STERILE DISPOSABLE TOURNIQUET CUFF WITH PLC, DUAL PORT, SINGLE BLADDER, 18 IN. (46 CM)

## (undated) DEVICE — BANDAGE,GAUZE,BULKEE II,4.5"X4.1YD,STRL: Brand: MEDLINE

## (undated) DEVICE — LINER,SEMI-RIGID,3000CC,50EA/CS: Brand: MEDLINE

## (undated) DEVICE — INTENDED FOR TISSUE SEPARATION, AND OTHER PROCEDURES THAT REQUIRE A SHARP SURGICAL BLADE TO PUNCTURE OR CUT.: Brand: BARD-PARKER ® STAINLESS STEEL BLADES

## (undated) DEVICE — SUTURE ETHLN SZ 4-0 L18IN NONABSORBABLE BLK L19MM FS-2 3/8 662G

## (undated) DEVICE — GOWN,ECLIPSE,POLYRNF,BRTHSLV,XL,30/CS: Brand: MEDLINE

## (undated) DEVICE — DRAPE,EXTREMITY,89X128,STERILE: Brand: MEDLINE

## (undated) DEVICE — SUTURE 3-0 VCRL CTD SH-1 J219H

## (undated) DEVICE — COUNTER NDL 30 COUNT FOAM STRP SGL MAG

## (undated) DEVICE — DRESSING,GAUZE,XEROFORM,CURAD,1"X8",ST: Brand: CURAD

## (undated) DEVICE — SOLUTION IV IRRIG WATER 1000ML POUR BRL 2F7114

## (undated) DEVICE — TUBING, SUCTION, 9/32" X 10', STRAIGHT: Brand: MEDLINE

## (undated) DEVICE — GLOVE SURG SZ 65 L12IN FNGR THK87MIL WHT LTX FREE

## (undated) DEVICE — SUTURE NONABSORBABLE MONOFILAMENT 4-0 FS-2 18 IN ETHILON 662H